# Patient Record
Sex: MALE | Race: WHITE | Employment: FULL TIME | URBAN - METROPOLITAN AREA
[De-identification: names, ages, dates, MRNs, and addresses within clinical notes are randomized per-mention and may not be internally consistent; named-entity substitution may affect disease eponyms.]

---

## 2017-01-07 ENCOUNTER — LAB ENCOUNTER (OUTPATIENT)
Dept: LAB | Facility: HOSPITAL | Age: 57
End: 2017-01-07
Attending: FAMILY MEDICINE
Payer: COMMERCIAL

## 2017-01-07 ENCOUNTER — PRIOR ORIGINAL RECORDS (OUTPATIENT)
Dept: OTHER | Age: 57
End: 2017-01-07

## 2017-01-07 DIAGNOSIS — E78.2 HYPERLIPIDEMIA, MIXED: ICD-10-CM

## 2017-01-07 DIAGNOSIS — E11.9 CONTROLLED TYPE 2 DIABETES MELLITUS WITHOUT COMPLICATION, WITHOUT LONG-TERM CURRENT USE OF INSULIN (HCC): ICD-10-CM

## 2017-01-07 DIAGNOSIS — I10 ESSENTIAL HYPERTENSION: ICD-10-CM

## 2017-01-07 LAB
ALBUMIN SERPL-MCNC: 4 G/DL (ref 3.5–4.8)
ALP LIVER SERPL-CCNC: 71 U/L (ref 45–117)
ALT SERPL-CCNC: 44 U/L (ref 17–63)
AST SERPL-CCNC: 28 U/L (ref 15–41)
BILIRUB SERPL-MCNC: 0.5 MG/DL (ref 0.1–2)
BUN BLD-MCNC: 20 MG/DL (ref 8–20)
CALCIUM BLD-MCNC: 8.8 MG/DL (ref 8.3–10.3)
CHLORIDE: 105 MMOL/L (ref 101–111)
CHOLEST SMN-MCNC: 162 MG/DL (ref ?–200)
CO2: 25 MMOL/L (ref 22–32)
CREAT BLD-MCNC: 1.07 MG/DL (ref 0.7–1.3)
CREAT UR-SCNC: 299 MG/DL
EST. AVERAGE GLUCOSE BLD GHB EST-MCNC: 143 MG/DL (ref 68–126)
GLUCOSE BLD-MCNC: 135 MG/DL (ref 70–99)
HBA1C MFR BLD HPLC: 6.6 % (ref ?–5.7)
HDLC SERPL-MCNC: 36 MG/DL (ref 45–?)
HDLC SERPL: 4.5 {RATIO} (ref ?–4.97)
LDLC SERPL DIRECT ASSAY-MCNC: 78 MG/DL (ref ?–100)
M PROTEIN MFR SERPL ELPH: 6.7 G/DL (ref 6.1–8.3)
MICROALBUMIN UR-MCNC: 4.1 MG/DL
MICROALBUMIN/CREAT 24H UR-RTO: 13.7 UG/MG (ref ?–30)
NONHDLC SERPL-MCNC: 126 MG/DL (ref ?–130)
POTASSIUM SERPL-SCNC: 4.2 MMOL/L (ref 3.6–5.1)
SODIUM SERPL-SCNC: 138 MMOL/L (ref 136–144)
TRIGLYCERIDES: 443 MG/DL (ref ?–150)

## 2017-01-07 PROCEDURE — 83036 HEMOGLOBIN GLYCOSYLATED A1C: CPT

## 2017-01-07 PROCEDURE — 82570 ASSAY OF URINE CREATININE: CPT

## 2017-01-07 PROCEDURE — 80061 LIPID PANEL: CPT

## 2017-01-07 PROCEDURE — 82043 UR ALBUMIN QUANTITATIVE: CPT

## 2017-01-07 PROCEDURE — 36415 COLL VENOUS BLD VENIPUNCTURE: CPT

## 2017-01-07 PROCEDURE — 80053 COMPREHEN METABOLIC PANEL: CPT

## 2017-01-07 PROCEDURE — 83721 ASSAY OF BLOOD LIPOPROTEIN: CPT

## 2017-01-09 ENCOUNTER — CHARTING TRANS (OUTPATIENT)
Dept: OTHER | Age: 57
End: 2017-01-09

## 2017-01-09 ASSESSMENT — PAIN SCALES - GENERAL: PAINLEVEL_OUTOF10: 0

## 2017-02-06 ENCOUNTER — MYAURORA ACCOUNT LINK (OUTPATIENT)
Dept: OTHER | Age: 57
End: 2017-02-06

## 2017-02-06 ENCOUNTER — HOSPITAL ENCOUNTER (OUTPATIENT)
Dept: CARDIOLOGY CLINIC | Facility: HOSPITAL | Age: 57
Discharge: HOME | End: 2017-02-06
Attending: INTERNAL MEDICINE

## 2017-02-06 DIAGNOSIS — I65.23 BILATERAL CAROTID ARTERY STENOSIS: ICD-10-CM

## 2017-02-07 ENCOUNTER — TELEPHONE (OUTPATIENT)
Dept: FAMILY MEDICINE CLINIC | Facility: CLINIC | Age: 57
End: 2017-02-07

## 2017-02-08 ENCOUNTER — MED REC SCAN ONLY (OUTPATIENT)
Dept: FAMILY MEDICINE CLINIC | Facility: CLINIC | Age: 57
End: 2017-02-08

## 2017-02-20 ENCOUNTER — PRIOR ORIGINAL RECORDS (OUTPATIENT)
Dept: OTHER | Age: 57
End: 2017-02-20

## 2017-02-21 LAB
ALBUMIN: 4 G/DL
ALKALINE PHOSPHATATE(ALK PHOS): 71 IU/L
BILIRUBIN TOTAL: 0.5 MG/DL
BUN: 20 MG/DL
CALCIUM: 8.8 MG/DL
CHLORIDE: 105 MEQ/L
CHOLESTEROL, TOTAL: 162 MG/DL
CREATININE, SERUM: 1.07 MG/DL
GLUCOSE: 135 MG/DL
HDL CHOLESTEROL: 36 MG/DL
HEMOGLOBIN A1C: 6.6 %
POTASSIUM, SERUM: 4.2 MEQ/L
PROTEIN, TOTAL: 6.7 G/DL
SGOT (AST): 28 IU/L
SGPT (ALT): 44 IU/L
SODIUM: 138 MEQ/L
TRIGLYCERIDES: 443 MG/DL

## 2017-03-06 RX ORDER — OMEGA-3-ACID ETHYL ESTERS 1 G/1
CAPSULE, LIQUID FILLED ORAL
Qty: 360 CAPSULE | Refills: 1 | Status: SHIPPED | OUTPATIENT
Start: 2017-03-06 | End: 2017-06-16

## 2017-03-06 NOTE — TELEPHONE ENCOUNTER
No future appointments. LOV 11/16     LAST LAB 1/17    LAST RX  [place]-3-acid Ethyl Esters 1 G Oral Cap 360 capsule 1 7/6/2016         PROTOCOL  Cholesterol Medication Protocol Passed3/5 2  Refilled x 6 months.

## 2017-03-24 ENCOUNTER — HOSPITAL ENCOUNTER (OUTPATIENT)
Dept: CV DIAGNOSTICS | Facility: HOSPITAL | Age: 57
Discharge: HOME | End: 2017-03-24
Attending: INTERNAL MEDICINE
Payer: COMMERCIAL

## 2017-03-24 DIAGNOSIS — I10 ESSENTIAL (PRIMARY) HYPERTENSION: ICD-10-CM

## 2017-03-24 DIAGNOSIS — I25.10 CAD (CORONARY ARTERY DISEASE): ICD-10-CM

## 2017-03-24 PROCEDURE — 78452 HT MUSCLE IMAGE SPECT MULT: CPT | Performed by: INTERNAL MEDICINE

## 2017-03-24 PROCEDURE — 78452 HT MUSCLE IMAGE SPECT MULT: CPT

## 2017-03-24 PROCEDURE — 93018 CV STRESS TEST I&R ONLY: CPT | Performed by: INTERNAL MEDICINE

## 2017-03-24 PROCEDURE — 93017 CV STRESS TEST TRACING ONLY: CPT

## 2017-04-29 ENCOUNTER — CHARTING TRANS (OUTPATIENT)
Dept: OTHER | Age: 57
End: 2017-04-29

## 2017-05-25 ENCOUNTER — TELEPHONE (OUTPATIENT)
Dept: FAMILY MEDICINE CLINIC | Facility: CLINIC | Age: 57
End: 2017-05-25

## 2017-05-25 DIAGNOSIS — Z79.899 ENCOUNTER FOR LONG-TERM (CURRENT) USE OF MEDICATIONS: ICD-10-CM

## 2017-05-25 DIAGNOSIS — I10 ESSENTIAL HYPERTENSION, BENIGN: Primary | ICD-10-CM

## 2017-05-25 DIAGNOSIS — E78.00 PURE HYPERCHOLESTEROLEMIA: ICD-10-CM

## 2017-05-25 DIAGNOSIS — E78.1 PURE HYPERGLYCERIDEMIA: ICD-10-CM

## 2017-05-25 NOTE — TELEPHONE ENCOUNTER
Pt scheduled 6 month diabetic OV  Due for repeat labs    Orders placed    Future Appointments  Date Time Provider Steffi Karen   6/13/2017 1:30 PM Zenaida Nagy MD [facility] 21 [facility] [address]

## 2017-06-13 ENCOUNTER — APPOINTMENT (OUTPATIENT)
Dept: LAB | Facility: HOSPITAL | Age: 57
End: 2017-06-13
Attending: FAMILY MEDICINE
Payer: COMMERCIAL

## 2017-06-13 DIAGNOSIS — E78.1 PURE HYPERGLYCERIDEMIA: ICD-10-CM

## 2017-06-13 DIAGNOSIS — I10 ESSENTIAL HYPERTENSION, BENIGN: ICD-10-CM

## 2017-06-13 DIAGNOSIS — E78.00 PURE HYPERCHOLESTEROLEMIA: ICD-10-CM

## 2017-06-13 DIAGNOSIS — Z79.899 ENCOUNTER FOR LONG-TERM (CURRENT) USE OF MEDICATIONS: ICD-10-CM

## 2017-06-13 PROCEDURE — 80053 COMPREHEN METABOLIC PANEL: CPT

## 2017-06-13 PROCEDURE — 36415 COLL VENOUS BLD VENIPUNCTURE: CPT

## 2017-06-13 PROCEDURE — 83036 HEMOGLOBIN GLYCOSYLATED A1C: CPT

## 2017-06-13 PROCEDURE — 80061 LIPID PANEL: CPT

## 2017-06-16 ENCOUNTER — OFFICE VISIT (OUTPATIENT)
Dept: FAMILY MEDICINE CLINIC | Facility: CLINIC | Age: 57
End: 2017-06-16

## 2017-06-16 VITALS
WEIGHT: 233 LBS | SYSTOLIC BLOOD PRESSURE: 118 MMHG | OXYGEN SATURATION: 98 % | HEIGHT: 70.87 IN | BODY MASS INDEX: 32.62 KG/M2 | HEART RATE: 54 BPM | DIASTOLIC BLOOD PRESSURE: 70 MMHG

## 2017-06-16 DIAGNOSIS — I25.10 ATHEROSCLEROSIS OF NATIVE CORONARY ARTERY OF NATIVE HEART WITHOUT ANGINA PECTORIS: ICD-10-CM

## 2017-06-16 DIAGNOSIS — I95.1 ORTHOSTATIC HYPOTENSION: ICD-10-CM

## 2017-06-16 DIAGNOSIS — E78.2 MIXED HYPERLIPIDEMIA: Primary | ICD-10-CM

## 2017-06-16 DIAGNOSIS — R73.9 HYPERGLYCEMIA: ICD-10-CM

## 2017-06-16 DIAGNOSIS — I10 ESSENTIAL HYPERTENSION, BENIGN: ICD-10-CM

## 2017-06-16 PROCEDURE — 99214 OFFICE O/P EST MOD 30 MIN: CPT | Performed by: FAMILY MEDICINE

## 2017-06-16 RX ORDER — LISINOPRIL 40 MG/1
20 TABLET ORAL DAILY
Refills: 3 | COMMUNITY
Start: 2017-06-02 | End: 2017-06-16 | Stop reason: DRUGHIGH

## 2017-06-16 RX ORDER — ATENOLOL 25 MG/1
TABLET ORAL
Refills: 3 | COMMUNITY
Start: 2017-06-02 | End: 2017-06-16

## 2017-06-16 RX ORDER — LISINOPRIL 20 MG/1
20 TABLET ORAL DAILY
Qty: 90 TABLET | Refills: 1 | Status: SHIPPED | OUTPATIENT
Start: 2017-06-16 | End: 2018-01-06

## 2017-06-16 RX ORDER — ATENOLOL 25 MG/1
25 TABLET ORAL DAILY
Qty: 90 TABLET | Refills: 1 | Status: SHIPPED | OUTPATIENT
Start: 2017-06-16

## 2017-06-16 RX ORDER — OMEGA-3-ACID ETHYL ESTERS 1 G/1
CAPSULE, LIQUID FILLED ORAL
Qty: 360 CAPSULE | Refills: 1 | Status: SHIPPED | OUTPATIENT
Start: 2017-06-16 | End: 2017-10-24

## 2017-06-16 NOTE — PATIENT INSTRUCTIONS
You're doing great with lifestyle change! Decrease lisinopril to 20 mg daily, recheck in 6 months on labs & exam.    If BP runs over 130/80 consistently, let cardiology know.  If over 140/90, let us know, you should increase some then on lisinopril, could

## 2017-06-16 NOTE — PROGRESS NOTES
Larry Cleaning IS A 60year old male HERE FOR Patient presents with:  Diabetes: follow up       History of present illness:     Exercising more, less rowing and more walking. Lost weight. Rowing made him more hungry. Sticking with meat, fish, veggies.  Av GLUCOS-CHONDROIT-CA-MG-C-D OR Take  by mouth. Disp:  Rfl:    aspirin (ASPIR-81) 81 MG Oral Tab EC Take 81 mg by mouth daily.  Disp:  Rfl:    [DISCONTINUED] atenolol 25 MG Oral Tab TK 1 T PO QD Disp:  Rfl: 3   [DISCONTINUED] atenolol (TENORMIN) 50 MG Oral lightheaded, otherwise normal    Exam:     /70 mmHg  Pulse 54  Ht 70.87\"  Wt 233 lb  BMI 32.62 kg/m2  SpO2 98%     Carotids normal without bruits, right scar noted  Lungs clear   Heart regular rhythm, bradycardia  Abdomen soft nontender  Bilater CAPSULES BY MOUTH TWICE DAILY  -     Lipid in 6 months; Future    Essential hypertension, benign--decrease lisinopril from 40 to 20 mg due to orthostatic hypotension  -     lisinopril 20 MG Oral Tab; Take 1 tablet (20 mg total) by mouth daily.   -     ateno

## 2017-06-19 ENCOUNTER — MED REC SCAN ONLY (OUTPATIENT)
Dept: FAMILY MEDICINE CLINIC | Facility: CLINIC | Age: 57
End: 2017-06-19

## 2017-07-12 RX ORDER — RANITIDINE 150 MG/1
CAPSULE ORAL
Qty: 90 CAPSULE | Refills: 0 | Status: SHIPPED | OUTPATIENT
Start: 2017-07-12 | End: 2017-12-11

## 2017-07-12 NOTE — TELEPHONE ENCOUNTER
Future Appointments  Date Time Provider Steffi Jones   12/22/2017 8:00 AM Yolie Love MD [facility] 21 EMG [address] 59     LOV 6/17    LAST LAB 6/17     LAST RX   RaNITidine HCl 150 MG Oral Cap 90 capsule 3 11/28/2016         PROTOCOL  Gastrointestinal Medi

## 2017-09-01 ENCOUNTER — CHARTING TRANS (OUTPATIENT)
Dept: OTHER | Age: 57
End: 2017-09-01

## 2017-10-10 ENCOUNTER — TELEPHONE (OUTPATIENT)
Dept: FAMILY MEDICINE CLINIC | Facility: CLINIC | Age: 57
End: 2017-10-10

## 2017-10-10 RX ORDER — OMEGA-3-ACID ETHYL ESTERS 1 G/1
CAPSULE, LIQUID FILLED ORAL
Qty: 360 CAPSULE | Refills: 0 | OUTPATIENT
Start: 2017-10-10

## 2017-10-10 NOTE — TELEPHONE ENCOUNTER
Cholesterol Medication Protocol Yzbtsf03/10 6:14 AM   ALT < 80    ALT resulted within past year    Lipid panel within past 12 months    Appointment within past 12 or next 3 months     Last refill 6/16/17 x 6 months    NEED TO CANCEL AND RESCHEDULE APPT UNT

## 2017-10-11 NOTE — TELEPHONE ENCOUNTER
Called patient and LVM stating we need to reschedule appt for Jan but to please still do labs in Dec.  Call clinic to reschedule.

## 2017-10-11 NOTE — TELEPHONE ENCOUNTER
Patient returned our call to reschedule appt. Future Appointments  Date Time Provider Steffi Jones   1/19/2018 9:00 AM Rita Carrillo MD [facility] 21 EM[facility] [address]     Reminded him to have labs drawn in December.

## 2017-10-24 RX ORDER — OMEGA-3-ACID ETHYL ESTERS 1 G/1
CAPSULE, LIQUID FILLED ORAL
Qty: 360 CAPSULE | Refills: 1 | Status: SHIPPED | OUTPATIENT
Start: 2017-10-24 | End: 2018-01-18

## 2017-10-24 NOTE — TELEPHONE ENCOUNTER
Future Appointments  Date Time Provider Steffi Jones   1/19/2018 9:00 AM Angela Stanford MD EM[facility] 21 EM[facility] [address] 59       LOV 6/17    LAST LAB 6/17    LAST RX    [place]-3-acid Ethyl Esters 1 g Oral Cap 360 capsule 1 6/16/2017         PROTOCOL  Cholesterol

## 2017-12-12 RX ORDER — RANITIDINE 150 MG/1
CAPSULE ORAL
Qty: 90 CAPSULE | Refills: 0 | Status: SHIPPED | OUTPATIENT
Start: 2017-12-12 | End: 2018-01-18

## 2017-12-12 NOTE — TELEPHONE ENCOUNTER
Future Appointments  Date Time Provider Steffi Jones   1/19/2018 9:00 AM Cesario Babinski, MD EM[facility] 21 EMG [address]t 59       LOV 6/16    LAST LAB 6/17     LAST RX   RANITIDINE  MG Oral Cap 90 capsule 0 7/12/2017       PROTOCOL  Gastrointestinal Medica

## 2017-12-20 ENCOUNTER — OFFICE VISIT (OUTPATIENT)
Dept: FAMILY MEDICINE CLINIC | Facility: CLINIC | Age: 57
End: 2017-12-20

## 2017-12-20 VITALS
HEART RATE: 52 BPM | DIASTOLIC BLOOD PRESSURE: 70 MMHG | RESPIRATION RATE: 16 BRPM | TEMPERATURE: 98 F | BODY MASS INDEX: 32.1 KG/M2 | HEIGHT: 70.25 IN | WEIGHT: 224.25 LBS | SYSTOLIC BLOOD PRESSURE: 132 MMHG

## 2017-12-20 DIAGNOSIS — G47.00 INSOMNIA, UNSPECIFIED TYPE: ICD-10-CM

## 2017-12-20 PROCEDURE — 99213 OFFICE O/P EST LOW 20 MIN: CPT | Performed by: FAMILY MEDICINE

## 2017-12-20 RX ORDER — CLONAZEPAM 0.5 MG/1
0.5 TABLET ORAL NIGHTLY PRN
Qty: 10 TABLET | Refills: 0 | Status: SHIPPED | OUTPATIENT
Start: 2017-12-20 | End: 2017-12-30

## 2017-12-20 RX ORDER — TRAZODONE HYDROCHLORIDE 50 MG/1
TABLET ORAL NIGHTLY
Qty: 90 TABLET | Refills: 1 | Status: SHIPPED | OUTPATIENT
Start: 2017-12-20 | End: 2018-01-26 | Stop reason: ALTCHOICE

## 2017-12-20 NOTE — PROGRESS NOTES
HPI:   Froilan Domínguez is a 59year old male that presents for follow-up insomnia. Every 1-2 years he struggles with insomnia and uses trazodone for sleep. He takes between 25 and 50 mg nightly which works well for him.   He states in the past 2 week he 70.25\"   Wt 224 lb 4 oz   BMI 31.95 kg/m²  Estimated body mass index is 31.95 kg/m² as calculated from the following:    Height as of this encounter: 70.25\". Weight as of this encounter: 224 lb 4 oz. Vital signs reviewed. Appears stated age, well colton

## 2018-01-06 DIAGNOSIS — I10 ESSENTIAL HYPERTENSION, BENIGN: ICD-10-CM

## 2018-01-08 RX ORDER — LISINOPRIL 20 MG/1
TABLET ORAL
Qty: 90 TABLET | Refills: 0 | Status: SHIPPED | OUTPATIENT
Start: 2018-01-08 | End: 2018-01-18

## 2018-01-08 NOTE — TELEPHONE ENCOUNTER
Future Appointments  Date Time Provider Steffi Jones   1/19/2018 9:00 AM Augustine Mendez MD [facility] 21 EM[facility] [address]   2/9/2018 9:30 AM Cheri Francisco MD [facility] 21 EM[facility] [address]     LOV  6/17    LAST LAB 6/17    LAST RX   lisinopril 20 MG Oral Tab 90 tablet 1

## 2018-01-15 ENCOUNTER — APPOINTMENT (OUTPATIENT)
Dept: LAB | Age: 58
End: 2018-01-15
Attending: FAMILY MEDICINE
Payer: COMMERCIAL

## 2018-01-15 ENCOUNTER — PRIOR ORIGINAL RECORDS (OUTPATIENT)
Dept: OTHER | Age: 58
End: 2018-01-15

## 2018-01-15 DIAGNOSIS — R73.9 HYPERGLYCEMIA: ICD-10-CM

## 2018-01-15 DIAGNOSIS — E78.2 MIXED HYPERLIPIDEMIA: ICD-10-CM

## 2018-01-15 LAB
ALBUMIN SERPL-MCNC: 3.9 G/DL (ref 3.5–4.8)
ALP LIVER SERPL-CCNC: 51 U/L (ref 45–117)
ALT SERPL-CCNC: 26 U/L (ref 17–63)
AST SERPL-CCNC: 14 U/L (ref 15–41)
BILIRUB SERPL-MCNC: 0.7 MG/DL (ref 0.1–2)
BUN BLD-MCNC: 22 MG/DL (ref 8–20)
CALCIUM BLD-MCNC: 9.1 MG/DL (ref 8.3–10.3)
CHLORIDE: 108 MMOL/L (ref 101–111)
CHOLEST SMN-MCNC: 125 MG/DL (ref ?–200)
CO2: 26 MMOL/L (ref 22–32)
CREAT BLD-MCNC: 1.08 MG/DL (ref 0.7–1.3)
EST. AVERAGE GLUCOSE BLD GHB EST-MCNC: 123 MG/DL (ref 68–126)
GLUCOSE BLD-MCNC: 111 MG/DL (ref 70–99)
HBA1C MFR BLD HPLC: 5.9 % (ref ?–5.7)
HDLC SERPL-MCNC: 34 MG/DL (ref 45–?)
HDLC SERPL: 3.68 {RATIO} (ref ?–4.97)
LDLC SERPL CALC-MCNC: 61 MG/DL (ref ?–130)
M PROTEIN MFR SERPL ELPH: 6.7 G/DL (ref 6.1–8.3)
NONHDLC SERPL-MCNC: 91 MG/DL (ref ?–130)
POTASSIUM SERPL-SCNC: 4.8 MMOL/L (ref 3.6–5.1)
SODIUM SERPL-SCNC: 141 MMOL/L (ref 136–144)
TRIGL SERPL-MCNC: 150 MG/DL (ref ?–150)
VLDLC SERPL CALC-MCNC: 30 MG/DL (ref 5–40)

## 2018-01-15 PROCEDURE — 83036 HEMOGLOBIN GLYCOSYLATED A1C: CPT | Performed by: FAMILY MEDICINE

## 2018-01-15 PROCEDURE — 80053 COMPREHEN METABOLIC PANEL: CPT | Performed by: FAMILY MEDICINE

## 2018-01-15 PROCEDURE — 80061 LIPID PANEL: CPT | Performed by: FAMILY MEDICINE

## 2018-01-15 PROCEDURE — 36415 COLL VENOUS BLD VENIPUNCTURE: CPT | Performed by: FAMILY MEDICINE

## 2018-01-18 ENCOUNTER — HOSPITAL ENCOUNTER (OUTPATIENT)
Facility: HOSPITAL | Age: 58
Setting detail: OBSERVATION
Discharge: HOME | End: 2018-01-19
Attending: EMERGENCY MEDICINE | Admitting: INTERNAL MEDICINE
Payer: COMMERCIAL

## 2018-01-18 ENCOUNTER — PRIOR ORIGINAL RECORDS (OUTPATIENT)
Dept: OTHER | Age: 58
End: 2018-01-18

## 2018-01-18 ENCOUNTER — APPOINTMENT (OUTPATIENT)
Dept: MRI IMAGING | Facility: HOSPITAL | Age: 58
End: 2018-01-18
Attending: INTERNAL MEDICINE
Payer: COMMERCIAL

## 2018-01-18 ENCOUNTER — APPOINTMENT (OUTPATIENT)
Dept: CT IMAGING | Age: 58
End: 2018-01-18
Attending: EMERGENCY MEDICINE
Payer: COMMERCIAL

## 2018-01-18 ENCOUNTER — APPOINTMENT (OUTPATIENT)
Dept: GENERAL RADIOLOGY | Facility: HOSPITAL | Age: 58
End: 2018-01-18
Attending: EMERGENCY MEDICINE
Payer: COMMERCIAL

## 2018-01-18 ENCOUNTER — APPOINTMENT (OUTPATIENT)
Dept: CT IMAGING | Facility: HOSPITAL | Age: 58
End: 2018-01-18
Attending: EMERGENCY MEDICINE
Payer: COMMERCIAL

## 2018-01-18 ENCOUNTER — TELEPHONE (OUTPATIENT)
Dept: FAMILY MEDICINE CLINIC | Facility: CLINIC | Age: 58
End: 2018-01-18

## 2018-01-18 DIAGNOSIS — G45.9 TRANSIENT CEREBRAL ISCHEMIA, UNSPECIFIED TYPE: Primary | ICD-10-CM

## 2018-01-18 DIAGNOSIS — G45.9 TRANSIENT CEREBRAL ISCHEMIA: ICD-10-CM

## 2018-01-18 DIAGNOSIS — E78.00 PURE HYPERCHOLESTEROLEMIA: ICD-10-CM

## 2018-01-18 LAB
ALBUMIN SERPL-MCNC: 4.3 G/DL (ref 3.5–4.8)
ALP LIVER SERPL-CCNC: 56 U/L (ref 45–117)
ALT SERPL-CCNC: 26 U/L (ref 17–63)
AST SERPL-CCNC: 14 U/L (ref 15–41)
BASOPHILS # BLD AUTO: 0.04 X10(3) UL (ref 0–0.1)
BASOPHILS NFR BLD AUTO: 0.4 %
BILIRUB SERPL-MCNC: 1 MG/DL (ref 0.1–2)
BUN BLD-MCNC: 22 MG/DL (ref 8–20)
CALCIUM BLD-MCNC: 9.2 MG/DL (ref 8.3–10.3)
CHLORIDE: 104 MMOL/L (ref 101–111)
CO2: 27 MMOL/L (ref 22–32)
CREAT BLD-MCNC: 1.07 MG/DL (ref 0.7–1.3)
EOSINOPHIL # BLD AUTO: 0.17 X10(3) UL (ref 0–0.3)
EOSINOPHIL NFR BLD AUTO: 1.9 %
ERYTHROCYTE [DISTWIDTH] IN BLOOD BY AUTOMATED COUNT: 12.1 % (ref 11.5–16)
EST. AVERAGE GLUCOSE BLD GHB EST-MCNC: 120 MG/DL (ref 68–126)
GLUCOSE BLD-MCNC: 102 MG/DL (ref 70–99)
HBA1C MFR BLD HPLC: 5.8 % (ref ?–5.7)
HCT VFR BLD AUTO: 43.9 % (ref 37–53)
HGB BLD-MCNC: 15.1 G/DL (ref 13–17)
IMMATURE GRANULOCYTE COUNT: 0.01 X10(3) UL (ref 0–1)
IMMATURE GRANULOCYTE RATIO %: 0.1 %
INR BLD: 1.11 (ref 0.89–1.11)
LYMPHOCYTES # BLD AUTO: 2.19 X10(3) UL (ref 0.9–4)
LYMPHOCYTES NFR BLD AUTO: 24.3 %
M PROTEIN MFR SERPL ELPH: 7.3 G/DL (ref 6.1–8.3)
MCH RBC QN AUTO: 29.5 PG (ref 27–33.2)
MCHC RBC AUTO-ENTMCNC: 34.4 G/DL (ref 31–37)
MCV RBC AUTO: 85.7 FL (ref 80–99)
MONOCYTES # BLD AUTO: 0.6 X10(3) UL (ref 0.1–0.6)
MONOCYTES NFR BLD AUTO: 6.7 %
NEUTROPHIL ABS PRELIM: 6.01 X10 (3) UL (ref 1.3–6.7)
NEUTROPHILS # BLD AUTO: 6.01 X10(3) UL (ref 1.3–6.7)
NEUTROPHILS NFR BLD AUTO: 66.6 %
PLATELET # BLD AUTO: 237 10(3)UL (ref 150–450)
POTASSIUM SERPL-SCNC: 3.8 MMOL/L (ref 3.6–5.1)
PSA SERPL DL<=0.01 NG/ML-MCNC: 14.3 SECONDS (ref 12–14.3)
RBC # BLD AUTO: 5.12 X10(6)UL (ref 4.3–5.7)
RED CELL DISTRIBUTION WIDTH-SD: 38 FL (ref 35.1–46.3)
SODIUM SERPL-SCNC: 138 MMOL/L (ref 136–144)
WBC # BLD AUTO: 9 X10(3) UL (ref 4–13)

## 2018-01-18 PROCEDURE — 70450 CT HEAD/BRAIN W/O DYE: CPT | Performed by: EMERGENCY MEDICINE

## 2018-01-18 PROCEDURE — 70544 MR ANGIOGRAPHY HEAD W/O DYE: CPT | Performed by: INTERNAL MEDICINE

## 2018-01-18 PROCEDURE — 99220 INITIAL OBSERVATION CARE,LEVL III: CPT | Performed by: INTERNAL MEDICINE

## 2018-01-18 PROCEDURE — 70547 MR ANGIOGRAPHY NECK W/O DYE: CPT | Performed by: INTERNAL MEDICINE

## 2018-01-18 PROCEDURE — 70551 MRI BRAIN STEM W/O DYE: CPT | Performed by: INTERNAL MEDICINE

## 2018-01-18 PROCEDURE — 71045 X-RAY EXAM CHEST 1 VIEW: CPT | Performed by: EMERGENCY MEDICINE

## 2018-01-18 RX ORDER — DEXAMETHASONE SODIUM PHOSPHATE 4 MG/ML
10 VIAL (ML) INJECTION ONCE
Status: DISCONTINUED | OUTPATIENT
Start: 2018-01-18 | End: 2018-01-18

## 2018-01-18 RX ORDER — POLYETHYLENE GLYCOL 3350 17 G/17G
17 POWDER, FOR SOLUTION ORAL DAILY PRN
Status: DISCONTINUED | OUTPATIENT
Start: 2018-01-18 | End: 2018-01-19

## 2018-01-18 RX ORDER — CLONAZEPAM 0.5 MG/1
0.5 TABLET ORAL NIGHTLY PRN
Status: DISCONTINUED | OUTPATIENT
Start: 2018-01-18 | End: 2018-01-19

## 2018-01-18 RX ORDER — TRAZODONE HYDROCHLORIDE 50 MG/1
50 TABLET ORAL NIGHTLY
Status: DISCONTINUED | OUTPATIENT
Start: 2018-01-18 | End: 2018-01-19

## 2018-01-18 RX ORDER — MORPHINE SULFATE 2 MG/ML
2 INJECTION, SOLUTION INTRAMUSCULAR; INTRAVENOUS EVERY 2 HOUR PRN
Status: DISCONTINUED | OUTPATIENT
Start: 2018-01-18 | End: 2018-01-19

## 2018-01-18 RX ORDER — DOXEPIN HYDROCHLORIDE 50 MG/1
1 CAPSULE ORAL DAILY
Status: DISCONTINUED | OUTPATIENT
Start: 2018-01-18 | End: 2018-01-19

## 2018-01-18 RX ORDER — BISACODYL 10 MG
10 SUPPOSITORY, RECTAL RECTAL
Status: DISCONTINUED | OUTPATIENT
Start: 2018-01-18 | End: 2018-01-19

## 2018-01-18 RX ORDER — LISINOPRIL 20 MG/1
20 TABLET ORAL DAILY
COMMUNITY
End: 2018-04-11

## 2018-01-18 RX ORDER — FAMOTIDINE 20 MG/1
20 TABLET ORAL 2 TIMES DAILY
Status: DISCONTINUED | OUTPATIENT
Start: 2018-01-18 | End: 2018-01-19

## 2018-01-18 RX ORDER — TRAZODONE HYDROCHLORIDE 50 MG/1
TABLET ORAL NIGHTLY
Status: DISCONTINUED | OUTPATIENT
Start: 2018-01-18 | End: 2018-01-18 | Stop reason: SDUPTHER

## 2018-01-18 RX ORDER — CLONAZEPAM 0.5 MG/1
0.5 TABLET ORAL NIGHTLY PRN
Status: ON HOLD | COMMUNITY
End: 2018-01-19

## 2018-01-18 RX ORDER — ATENOLOL 25 MG/1
25 TABLET ORAL NIGHTLY
Status: DISCONTINUED | OUTPATIENT
Start: 2018-01-18 | End: 2018-01-19

## 2018-01-18 RX ORDER — METOCLOPRAMIDE HYDROCHLORIDE 5 MG/ML
10 INJECTION INTRAMUSCULAR; INTRAVENOUS EVERY 8 HOURS PRN
Status: DISCONTINUED | OUTPATIENT
Start: 2018-01-18 | End: 2018-01-19

## 2018-01-18 RX ORDER — SODIUM CHLORIDE 9 MG/ML
INJECTION, SOLUTION INTRAVENOUS CONTINUOUS
Status: DISCONTINUED | OUTPATIENT
Start: 2018-01-18 | End: 2018-01-19

## 2018-01-18 RX ORDER — ONDANSETRON 2 MG/ML
4 INJECTION INTRAMUSCULAR; INTRAVENOUS EVERY 6 HOURS PRN
Status: DISCONTINUED | OUTPATIENT
Start: 2018-01-18 | End: 2018-01-19

## 2018-01-18 RX ORDER — ATORVASTATIN CALCIUM 80 MG/1
80 TABLET, FILM COATED ORAL NIGHTLY
Status: DISCONTINUED | OUTPATIENT
Start: 2018-01-18 | End: 2018-01-19

## 2018-01-18 RX ORDER — MORPHINE SULFATE 2 MG/ML
1 INJECTION, SOLUTION INTRAMUSCULAR; INTRAVENOUS EVERY 2 HOUR PRN
Status: DISCONTINUED | OUTPATIENT
Start: 2018-01-18 | End: 2018-01-19

## 2018-01-18 RX ORDER — PHENYLEPHRINE HCL IN 0.9% NACL 50MG/250ML
PLASTIC BAG, INJECTION (ML) INTRAVENOUS CONTINUOUS PRN
Status: DISCONTINUED | OUTPATIENT
Start: 2018-01-18 | End: 2018-01-19

## 2018-01-18 RX ORDER — DOCUSATE SODIUM 100 MG/1
100 CAPSULE, LIQUID FILLED ORAL 2 TIMES DAILY
Status: DISCONTINUED | OUTPATIENT
Start: 2018-01-18 | End: 2018-01-19

## 2018-01-18 RX ORDER — POTASSIUM CHLORIDE 20 MEQ/1
40 TABLET, EXTENDED RELEASE ORAL ONCE
Status: COMPLETED | OUTPATIENT
Start: 2018-01-18 | End: 2018-01-18

## 2018-01-18 RX ORDER — RANITIDINE 150 MG/1
150 CAPSULE ORAL EVERY EVENING
COMMUNITY
End: 2018-03-13

## 2018-01-18 RX ORDER — SODIUM PHOSPHATE, DIBASIC AND SODIUM PHOSPHATE, MONOBASIC 7; 19 G/133ML; G/133ML
1 ENEMA RECTAL ONCE AS NEEDED
Status: DISCONTINUED | OUTPATIENT
Start: 2018-01-18 | End: 2018-01-19

## 2018-01-18 RX ORDER — ACETAMINOPHEN 325 MG/1
650 TABLET ORAL EVERY 4 HOURS PRN
Status: DISCONTINUED | OUTPATIENT
Start: 2018-01-18 | End: 2018-01-19

## 2018-01-18 RX ORDER — LISINOPRIL 10 MG/1
20 TABLET ORAL NIGHTLY
Status: DISCONTINUED | OUTPATIENT
Start: 2018-01-18 | End: 2018-01-19

## 2018-01-18 RX ORDER — ASPIRIN 300 MG
300 SUPPOSITORY, RECTAL RECTAL DAILY
Status: DISCONTINUED | OUTPATIENT
Start: 2018-01-18 | End: 2018-01-19

## 2018-01-18 RX ORDER — ACETAMINOPHEN 650 MG/1
650 SUPPOSITORY RECTAL EVERY 4 HOURS PRN
Status: DISCONTINUED | OUTPATIENT
Start: 2018-01-18 | End: 2018-01-19

## 2018-01-18 RX ORDER — FAMOTIDINE 10 MG/ML
20 INJECTION, SOLUTION INTRAVENOUS 2 TIMES DAILY
Status: DISCONTINUED | OUTPATIENT
Start: 2018-01-18 | End: 2018-01-19

## 2018-01-18 RX ORDER — ASPIRIN 325 MG
325 TABLET ORAL ONCE
Status: COMPLETED | OUTPATIENT
Start: 2018-01-18 | End: 2018-01-18

## 2018-01-18 RX ORDER — LABETALOL HYDROCHLORIDE 5 MG/ML
10 INJECTION, SOLUTION INTRAVENOUS EVERY 10 MIN PRN
Status: DISCONTINUED | OUTPATIENT
Start: 2018-01-18 | End: 2018-01-19

## 2018-01-18 RX ORDER — CHLORAL HYDRATE 500 MG
2000 CAPSULE ORAL 2 TIMES DAILY
COMMUNITY
End: 2019-06-07

## 2018-01-18 RX ORDER — ASPIRIN 325 MG
325 TABLET ORAL DAILY
Status: DISCONTINUED | OUTPATIENT
Start: 2018-01-18 | End: 2018-01-19

## 2018-01-18 RX ORDER — TRAZODONE HYDROCHLORIDE 50 MG/1
25 TABLET ORAL NIGHTLY
Status: DISCONTINUED | OUTPATIENT
Start: 2018-01-18 | End: 2018-01-19

## 2018-01-18 RX ORDER — MULTIVITAMIN WITH FOLIC ACID 400 MCG
1 TABLET ORAL DAILY
COMMUNITY

## 2018-01-18 RX ORDER — SENNOSIDES 8.6 MG
17.2 TABLET ORAL NIGHTLY
Status: DISCONTINUED | OUTPATIENT
Start: 2018-01-18 | End: 2018-01-19

## 2018-01-18 RX ORDER — CHLORAL HYDRATE 500 MG
2000 CAPSULE ORAL 2 TIMES DAILY
Status: DISCONTINUED | OUTPATIENT
Start: 2018-01-18 | End: 2018-01-18 | Stop reason: RX

## 2018-01-18 NOTE — TELEPHONE ENCOUNTER
ER right now. Numbness in the arm this am.  Could not sleep last night. Co-workers noticed slurred speech and sent him home. Pt took train back home from [facility], and wife drove him directly to [facility]. Daughter Nicolasa Mesa is not on HIPPA.     Wanted to

## 2018-01-18 NOTE — TELEPHONE ENCOUNTER
ER record reviewed, pt likely will be admitted for observation of TIA symptoms, he is known to be at risk, had significant past CAD and one carotid occlusion. Will cancel tomorrow's visit and have pt managed by [id number] [id number] neuro physicians.  Discussed

## 2018-01-18 NOTE — ED PROVIDER NOTES
Patient Seen in: [facility] Emergency Department    History   Patient presents with:  Numbness Weakness (neurologic)    Stated Complaint: right hand numbness/weakness, dizziness episode starting at 80 with some diffi*    HPI    64-year-old male com Years: 0.00         Quit date: 1/28/2008  Smokeless tobacco: Never Used                      Comment: 7-8 yrs ago  Alcohol use: Yes           0.0 oz/week     Comment: occ, up to 6 a day every 1-2 weeks.        Review of Systems    Positive for stated compla these tests on the individual orders. RAINBOW DRAW BLUE   RAINBOW DRAW LAVENDER   RAINBOW DRAW LIGHT GREEN   RAINBOW DRAW GOLD   CBC W/ DIFFERENTIAL     EKG    Rate, intervals and axes as noted on EKG Report.   Rate: 54  Rhythm: Sinus bradycardia  Reading right arm pain. CONCLUSION:  Heart size and pulmonary vascularity is within normal limits. No acute infiltrate, consolidation, effusion or pneumothorax.     Dictated by: Chris Tamez MD on 1/18/2018 at 13:09     Approved by: Chris Tamez MD

## 2018-01-18 NOTE — ED INITIAL ASSESSMENT (HPI)
Pt noted bilateral leg weakness and right arm numbness and expressive aphasia since last night. Pt states had difficulty using the mouse at work today. Pt denies c/o pain.   Pt states started last night, pt states legs felt \"rubbery\"

## 2018-01-19 ENCOUNTER — APPOINTMENT (OUTPATIENT)
Dept: CV DIAGNOSTICS | Facility: HOSPITAL | Age: 58
End: 2018-01-19
Attending: INTERNAL MEDICINE
Payer: COMMERCIAL

## 2018-01-19 VITALS
DIASTOLIC BLOOD PRESSURE: 69 MMHG | RESPIRATION RATE: 20 BRPM | WEIGHT: 216.38 LBS | BODY MASS INDEX: 29.31 KG/M2 | HEART RATE: 50 BPM | OXYGEN SATURATION: 96 % | HEIGHT: 72 IN | TEMPERATURE: 98 F | SYSTOLIC BLOOD PRESSURE: 127 MMHG

## 2018-01-19 LAB
ATRIAL RATE: 54 BPM
BASOPHILS # BLD AUTO: 0.04 X10(3) UL (ref 0–0.1)
BASOPHILS NFR BLD AUTO: 0.5 %
BUN BLD-MCNC: 19 MG/DL (ref 8–20)
CALCIUM BLD-MCNC: 9.3 MG/DL (ref 8.3–10.3)
CHLORIDE: 106 MMOL/L (ref 101–111)
CO2: 25 MMOL/L (ref 22–32)
CREAT BLD-MCNC: 1 MG/DL (ref 0.7–1.3)
EOSINOPHIL # BLD AUTO: 0.29 X10(3) UL (ref 0–0.3)
EOSINOPHIL NFR BLD AUTO: 3.5 %
ERYTHROCYTE [DISTWIDTH] IN BLOOD BY AUTOMATED COUNT: 12.2 % (ref 11.5–16)
GLUCOSE BLD-MCNC: 108 MG/DL (ref 70–99)
GLUCOSE BLD-MCNC: 97 MG/DL (ref 65–99)
GLUCOSE BLD-MCNC: 98 MG/DL (ref 65–99)
HAV IGM SER QL: 2.2 MG/DL (ref 1.7–3)
HCT VFR BLD AUTO: 43.6 % (ref 37–53)
HGB BLD-MCNC: 14.9 G/DL (ref 13–17)
IMMATURE GRANULOCYTE COUNT: 0.02 X10(3) UL (ref 0–1)
IMMATURE GRANULOCYTE RATIO %: 0.2 %
LYMPHOCYTES # BLD AUTO: 2.43 X10(3) UL (ref 0.9–4)
LYMPHOCYTES NFR BLD AUTO: 29.3 %
MCH RBC QN AUTO: 29.3 PG (ref 27–33.2)
MCHC RBC AUTO-ENTMCNC: 34.2 G/DL (ref 31–37)
MCV RBC AUTO: 85.8 FL (ref 80–99)
MONOCYTES # BLD AUTO: 0.62 X10(3) UL (ref 0.1–0.6)
MONOCYTES NFR BLD AUTO: 7.5 %
NEUTROPHIL ABS PRELIM: 4.9 X10 (3) UL (ref 1.3–6.7)
NEUTROPHILS # BLD AUTO: 4.9 X10(3) UL (ref 1.3–6.7)
NEUTROPHILS NFR BLD AUTO: 59 %
P AXIS: 60 DEGREES
P-R INTERVAL: 170 MS
PLATELET # BLD AUTO: 224 10(3)UL (ref 150–450)
POTASSIUM SERPL-SCNC: 4 MMOL/L (ref 3.6–5.1)
POTASSIUM SERPL-SCNC: 4 MMOL/L (ref 3.6–5.1)
Q-T INTERVAL: 444 MS
QRS DURATION: 94 MS
QTC CALCULATION (BEZET): 421 MS
R AXIS: 6 DEGREES
RBC # BLD AUTO: 5.08 X10(6)UL (ref 4.3–5.7)
RED CELL DISTRIBUTION WIDTH-SD: 38 FL (ref 35.1–46.3)
SODIUM SERPL-SCNC: 139 MMOL/L (ref 136–144)
T AXIS: 24 DEGREES
VENTRICULAR RATE: 54 BPM
WBC # BLD AUTO: 8.3 X10(3) UL (ref 4–13)

## 2018-01-19 PROCEDURE — 93306 TTE W/DOPPLER COMPLETE: CPT | Performed by: INTERNAL MEDICINE

## 2018-01-19 PROCEDURE — 95816 EEG AWAKE AND DROWSY: CPT | Performed by: OTHER

## 2018-01-19 PROCEDURE — 99243 OFF/OP CNSLTJ NEW/EST LOW 30: CPT | Performed by: OTHER

## 2018-01-19 PROCEDURE — 99217 OBSERVATION CARE DISCHARGE: CPT | Performed by: INTERNAL MEDICINE

## 2018-01-19 RX ORDER — ASPIRIN 81 MG/1
81 TABLET, CHEWABLE ORAL DAILY
Status: DISCONTINUED | OUTPATIENT
Start: 2018-01-20 | End: 2018-01-19

## 2018-01-19 RX ORDER — CLOPIDOGREL BISULFATE 75 MG/1
75 TABLET ORAL DAILY
Status: DISCONTINUED | OUTPATIENT
Start: 2018-01-19 | End: 2018-01-19

## 2018-01-19 RX ORDER — CLOPIDOGREL BISULFATE 75 MG/1
75 TABLET ORAL DAILY
Qty: 30 TABLET | Refills: 3 | Status: ON HOLD | OUTPATIENT
Start: 2018-01-19 | End: 2021-04-14

## 2018-01-19 RX ORDER — ATORVASTATIN CALCIUM 20 MG/1
20 TABLET, FILM COATED ORAL NIGHTLY
Status: DISCONTINUED | OUTPATIENT
Start: 2018-01-19 | End: 2018-01-19

## 2018-01-19 RX ORDER — CLONAZEPAM 0.5 MG/1
0.5 TABLET ORAL NIGHTLY PRN
Qty: 20 TABLET | Refills: 0 | Status: SHIPPED | OUTPATIENT
Start: 2018-01-19 | End: 2018-01-26

## 2018-01-19 NOTE — HISTORICAL OFFICE NOTE
Reina Caputo  : 1960  ACCOUNT:  [id number]  [phone]  PCP: Dr. Antonio Klein     TODAY'S DATE: 2017  DICTATED BY:  Jesse Flores M.D. ]    CHIEF COMPLAINT: [Followup of Abnormal UFCT, Followup of Mixed hyperlipidemia and Followup of Occ EYES: conjunctivae not injected and no xanthelasma. ENT: mucosa pink and moist. NECK: CEA scar R neck. RESP: respirations with normal rate and rhythm, clear to auscultation. GI: no masses, tenderness or hepatosplenomegaly, rectal deferred.  MS: adequate gai TABLET BY MOUTH AT BEDTIME           02/08/16 *Lisinopril           40MG      1 TABLET DAILY.                           10/04/12 Aspirin               325MG     1 daily         SHADY Ochoa/aazlea  D: 02/20/2017 - T: 03/01/2017 - Job ID# [id number]  C:

## 2018-01-19 NOTE — PROGRESS NOTES
NURSING ADMISSION NOTE      Patient admitted via Cart  Oriented to room. Safety precautions initiated. Bed in low position. Call light in reach. A/Ox4. Very pleasant and cooperative. C/o some mild weakness in R arm.  Denies any numbness or tingli

## 2018-01-19 NOTE — PHYSICAL THERAPY NOTE
PHYSICAL THERAPY QUICK EVALUATION - INPATIENT    Room Number: 3027/2275-X  Evaluation Date: 1/19/2018  Presenting Problem: R UE weakness, slurred speech, difficulty walking  Physician Order: PT Eval and Treat    Problem List  Principal Problem:    Blaise Hi fall risk    WEIGHT BEARING RESTRICTION  Weight Bearing Restriction: None                PAIN ASSESSMENT  Ratin         RANGE OF MOTION AND STRENGTH ASSESSMENT  Upper extremity ROM and strength: see OT evaluation    Lower extremity ROM is within functi Walks 20’, slow speed, abnormal gait pattern, evidence of imbalance. (0)  Severe Impairment: Cannot walk 20’ without assistance, severe gait deviations or imbalance. 2. Change in gait speed: 3  Grading: Ayo the lowest category that applies.   (3)  Nor down, staggers but recovers, can continue to walk. (0) Severe Impairment: Preforms task with severe disruption of gait, i.e., staggers outside 15” path, loses balance, stops, reaches for wall.       Skilled Therapy Provided: Patient presents semi-reclined admission. GOALS  Patient was able to achieve the following goals . ..     Patient was able to transfer At previous, functional level   Patient able to ambulate on level surfaces At previous, functional level

## 2018-01-19 NOTE — H&P
[facility]                                                               History & Physical         [facility] Patient Status:  Observation    1960 MRN [id number]   [facility] 3NE-A Attending Jyotsna Diane MD   [address] [facility] stent   • Carotid stenosis    • Coronary atherosclerosis of native coronary artery    • Esophageal reflux    • Essential hypertension, benign    • Gout    • Hyperlipidemia    • Hypertrophy of prostate with urinary obstruction and other lower urinary tra at 2000   TraZODone HCl 50 MG Oral Tab Take 0.5-1 tablets (25-50 mg total) by mouth nightly. Disp: 90 tablet Rfl: 1 1/17/2018 at 2000   atenolol 25 MG Oral Tab Take 1 tablet (25 mg total) by mouth daily.  Disp: 90 tablet Rfl: 1 1/17/2018 at [facility] 01/18/2018   ALT 26 01/18/2018     Imaging:   XR CHEST AP PORTABLE (CPT=71010)     TECHNIQUE:  AP chest radiograph was obtained. COMPARISON:  [facility] , CHEST PA   LATERAL, 6/29/2012, 6:30.      INDICATIONS:  right hand numbness/weakness, dizziness episod 1. Transient right hand numbness and weakness and aphasia, possible TIA– will monitor on telemetry. TIA protocol. Neurology consult. 2D echo. MRI and MRA of the head and neck. Aspirin 325 mg daily  2.  Carotid stenosis with previous right carotid e

## 2018-01-19 NOTE — DISCHARGE SUMMARY
[facility]  Discharge Summary    Ashley Fan Patient Status:  Observation    1960 MRN [id number]   [facility] 3NE-A Attending John Driscoll MD   Hosp Day # 0 PCP Viji Stevens MD     Date of Admission: 2018    Date follows up with cardiology Dr. Rita Rico regarding this according to patient. Patient has history of previous coronary angioplasty and stent according to patient. Patient denies any chest pain. Denies any shortness of breath.   Denies any associated palpit taking these medications      Instructions Prescription details   Clopidogrel Bisulfate 75 MG Tabs  Commonly known as:  PLAVIX      Take 1 tablet (75 mg total) by mouth daily.    Quantity:  30 tablet  Refills:  3        CONTINUE taking these medications Resp 20   Ht 6' (1.829 m)   Wt 216 lb 6.4 oz (98.2 kg)   SpO2 97%   BMI 29.35 kg/m²       General appearance: alert and cooperative  Head: Normocephalic, without obvious abnormality, atraumatic  Throat: oral mucosa moist  Neck: no adenopathy, no carotid br

## 2018-01-19 NOTE — CONSULTS
[facility]  Report of Consultation    Peri Fisher Patient Status:  Observation    1960 MRN [id number]   [facility] 3NE-A Attending Fei Prnice MD   Hosp Day # 0 PCP Ashley Barbosa MD     Reason for Consultation:  Mireya Bonilla Disorders Associated Other      fam hx   • High Cholesterol Other      fam hx   • Stroke Father    • Cancer Maternal Grandfather    • Heart Disease Brother       reports that he quit smoking about 9 years ago.  He has never used smokeless tobacco. He report (FLEET) 7-19 GM/118ML enema 133 mL, 1 enema, Rectal, Once PRN  •  ondansetron HCl (ZOFRAN) injection 4 mg, 4 mg, Intravenous, Q6H PRN **OR** Metoclopramide HCl (REGLAN) injection 10 mg, 10 mg, Intravenous, Q8H PRN  •  famoTIDine (PEPCID) tab 20 mg, 20 mg, 14.9 01/19/2018   HCT 43.6 01/19/2018   MCV 85.8 01/19/2018   MCH 29.3 01/19/2018   MCHC 34.2 01/19/2018   RDW 12.2 01/19/2018   .0 01/19/2018       Lab Results  Component Value Date   PT 13.9 09/19/2012   PT 14.0 09/18/2012   INR 1.11 01/18/2018

## 2018-01-19 NOTE — PLAN OF CARE
Pt alert and oriented x 4, breathing unlabored on room air, sinus cheryl on the monitor. Denies any headache, dizziness, denies numbness or tinglings on any extremities. SCD on.   PT/OT evaluation pending.      Impaired Swallowing    • Minimize aspiratio

## 2018-01-19 NOTE — SLP NOTE
ADULT SWALLOWING EVALUATION    ASSESSMENT    ASSESSMENT/OVERALL IMPRESSION:  Order received for bedside swallow evaluation per CVA/TIA protocol. Pt is a 58 y/o male admitted due to right arm/leg weakness and difficulty speaking.   MRI negative for CVA:  CO tx  Communication evaluation if word-finding difficulties persist                      Compensatory Strategies Recommended: Slow rate; Alternate consistencies;Small bites and sips  Aspiration Precautions: Upright position; Slow rate;Small bites and sips  Med Motion: Reduced right facial  Rate of Motion: Within Functional Limits    Voice Quality: Clear  Respiratory Status: Unlabored  Consistencies Trialed: Thin liquids; Hard solid  Method of Presentation: Self presentation;Spoon;Cup  Patient Positioning: Upright

## 2018-01-19 NOTE — PLAN OF CARE
Patient/Family Goals    • Patient/Family Long Term Goal Progressing    • Patient/Family Short Term Goal Progressing            Patient is A&Ox4  Denies any pain or discomfort  No n/v/d  Stroke protocol  Bradley KELLEY  Neurochecks q2 then q4 at [place] 1/19  SB

## 2018-01-19 NOTE — PROGRESS NOTES
Pharmacy Note: Dietary Supplement Discontinuation Per Policy    Glucosamine-Chondroitin-MSM and omega-3 fatty acids (fish oil) have been discontinued on [employer] per policy.  These supplements may be restarted upon discharge using the medication rec

## 2018-01-19 NOTE — CONSULTS
[facility]  Report of Consultation    Carlyn Pooleer Patient Status:  Observation    1960 MRN [id number]   [facility] 3NE-A Attending Paula Maloney MD   Hosp Day # 0 PCP Narciso Velásquez MD     Reason for Consultation:  KARIN MCGRATH w Father had stroke at age 62s and was very active person.       In ED, BP was up to 193/83 on arrival     Otherwise, patient denies any recent weight change, fevers, chills, nausea, double vision/ blurry vision / loss of vision, chest pain, palpitations, s Anxiety. Disp:  Rfl:  Past Week at PRN   Multiple Vitamin (TAB-A-ZULAY) Oral Tab Take 1 tablet by mouth daily. Disp:  Rfl:  1/17/2018 at 0900   Glucosa-Chondr-Na Chondr--105-077-65 MG Oral Tab Take 1 tablet by mouth daily.  Disp:  Rfl:  1/18/2018 at 0 mcg/min, Intravenous, Continuous PRN  •  aspirin 300 MG rectal suppository 300 mg, 300 mg, Rectal, Daily **OR** aspirin tab 325 mg, 325 mg, Oral, Daily  •  Senna (SENOKOT) tab TABS 17.2 mg, 17.2 mg, Oral, Nightly  •  docusate sodium (COLACE) cap 100 mg, 10 throughout, tone normal  DTR: 2+ symmetric throughout, toes downgoing bilaterally, no clonus  Sensory: intact to light touch, pinprick, vibration and proprioception  Coord: FNF and HKS intact with no tremor or dysmetria; rapid alternating movements intact 1/18/2018  CONCLUSION:  Heart size and pulmonary vascularity is within normal limits. No acute infiltrate, consolidation, effusion or pneumothorax.     Dictated by: Ronnie Dye MD on 1/18/2018 at 13:09     Approved by: Ronnie Dye MD

## 2018-01-19 NOTE — PROCEDURES
[address] EEG report    Name: Veronica Santana    Date of Study: 1/19/2018      Routine EEG Report    Ordering Physician: Manoj Holm MD                               Primary Care Physician: MD Wang Otoole injection 4 mg 4 mg Intravenous Q6H PRN   Or      Metoclopramide HCl (REGLAN) injection 10 mg 10 mg Intravenous Q8H PRN   famoTIDine (PEPCID) tab 20 mg 20 mg Oral BID   Or      famoTIDine (PEPCID) injection 20 mg 20 mg Intravenous BID   [COMPLETED] Padmini

## 2018-01-19 NOTE — PROGRESS NOTES
[id number] [facility] Neu[facility] Preliminary Note    Mandy Mckenzie Patient Status:  Observation    1960 MRN [id number]   [facility] 3NE-A Attending Joe Mcneill MD   Hosp Day # 0 PCP Alla Bustillo MD     REASON FOR EVALUATION: denies any history of seizure disorders, chronic back or neck pain or prior stroke. NIHSS completed last night = 0. Social:  Patient is employed at the [employer] where he has worked for the last 30 years.  He denies excessive alcohol consu mg Oral Nightly   ClonazePAM (KLONOPIN) tab 0.5 mg 0.5 mg Oral Nightly PRN   multivitamin (ADULT) tab 1 tablet 1 tablet Oral Daily   lisinopril (PRINIVIL,ZESTRIL) tab 20 mg 20 mg Oral Nightly   acetaminophen (TYLENOL) tab 650 mg 650 mg Oral Q4H PRN   Or Location: Left arm)   Pulse (!) 45   Temp 97.8 °F (36.6 °C) (Oral)   Resp 20   Ht 72\"   Wt 216 lb 6.4 oz   SpO2 97%   BMI 29.35 kg/m²   GENERAL:  Patient is a 59year old male in no acute distress.   HEENT:  Normocephalic, atraumatic  LUNGS: Clear to auscu Cholesterol      >45 mg/dL 34 (L)   LDL Cholesterol Calc      <130 mg/dL 61   VLDL      5 - 40 mg/dL 30   T.  CHOL/HDL RATIO      <4.97 3.68   NON HDL CHOL      <130 mg/dL 91     IMAGING:  [facility] 1/18/18 - no acute findings identified    MRI BRAIN / MRA Brain

## 2018-01-19 NOTE — OCCUPATIONAL THERAPY NOTE
OCCUPATIONAL THERAPY QUICK EVALUATION - INPATIENT    Room Number: 2372/9046-I  Evaluation Date: 1/19/2018     Type of Evaluation: Initial  Presenting Problem: TIA    Physician Order: IP Consult to Occupational Therapy  Reason for Therapy:  ADL/IADL Dysfunc better. \"    Patient self-stated goal is to go home    OBJECTIVE  Precautions: None  Fall Risk: Standard fall risk    WEIGHT BEARING RESTRICTION  Weight Bearing Restriction: None                PAIN ASSESSMENT  Ratin  Location: no pain       COGNITION deficits impact the patient’s ability to participate in ADLs, instrumental activities of daily living, rest and sleep, work, leisure and social participation.      Patient Complexity  Occupational Profile/Medical History  LOW - Brief history including revie

## 2018-01-19 NOTE — PAYOR COMM NOTE
--------------  ADMISSION REVIEW     Payor: [facility]  Subscriber #:  [id number]  Authorization Number: N/A    Admit date: N/A  Admit time: N/A       Admitting Physician: Joya Aguilar MD  Attending Physician:  Joya Aguilar MD  Primary Care Physician: CAD (coronary artery disease)     stent   • Coronary atherosclerosis of native coronary artery    • Esophageal reflux    • Essential hypertension, benign    • Gout    • Hyperlipidemia    • Hypertrophy of prostate with urinary obstruction and other lower ur 54  Rhythm: Sinus bradycardia  Reading: Agreed[MP.1]    Ct Brain Or Head (44409)  Result Date: 1/18/2018  PROCEDURE:  CT BRAIN OR HEAD (57618)  COMPARISON:  None.   INDICATIONS:  right hand numbness/weakness, dizziness episode starting at 930 with some diff Approved by: Kirk Gardner MD[MP.3]          ED Course as of Jan 18 1519  ------------------------------------------------------------[MP. 2]  I discussed this case with the patient's physician as well as neurology patient be admitted for further workup of he woke up today he was feeling good and he went to work today, was at a meeting at work and noticed that he had word finding difficulty. Patient states that he used to work for almost 30 years he could not remember.   Patient has history of carotid stenos completed. Pertinent positives and negatives noted in the the HPI. Physical Exam:     Vital signs: Blood pressure (!) 162/71, pulse 50, temperature 97.6 °F (36.4 °C), temperature source Oral, resp.  rate 18, height 6' (1.829 m), weight 216 lb 6.4 oz (98 infiltrate, consolidation, effusion or pneumothorax. CT BRAIN OR HEAD (63877)  COMPARISON:  None.   INDICATIONS:  right hand numbness/weakness, dizziness episode starting at 930 with some difficulties speaking, improving since 930  PATIENT STATED User    1/18/2018 2100 Given 80 mg Oral Toshia Juarez, RN      famoTIDine (PEPCID) tab 20 mg     Date Action Dose Route User    1/18/2018 2100 Given 20 mg Oral Toshia Juarez RN      lisinopril (PRINIVIL,ZESTRIL) tab 20 mg     Date Action Dose Ro

## 2018-01-20 NOTE — PROGRESS NOTES
Discharge order written. Discharge instruction reviewed with pt, pt's daughter and pt's spouse at bedside, verbalized understanding. Peripheral IV removed safely. Tele monitor returned. Transport requested.      @1900 Pt left floor per transport accompanie

## 2018-01-22 ENCOUNTER — PATIENT OUTREACH (OUTPATIENT)
Dept: CASE MANAGEMENT | Age: 58
End: 2018-01-22

## 2018-01-22 DIAGNOSIS — G45.9 TRANSIENT CEREBRAL ISCHEMIA, UNSPECIFIED TYPE: ICD-10-CM

## 2018-01-22 NOTE — PROGRESS NOTES
Initial Post Discharge Follow Up   Discharge Date: 1/19/18  Contact Date: 1/22/2018    Consent Verification:  Assessment Completed With: Patient  HIPAA Verified?   Yes    Discharge Dx:    Transient right hand numbness,weakness and aphasia, possible TIA TraZODone HCl 50 MG Oral Tab Take 0.5-1 tablets (25-50 mg total) by mouth nightly. Disp: 90 tablet Rfl: 1   atenolol 25 MG Oral Tab Take 1 tablet (25 mg total) by mouth daily.  Disp: 90 tablet Rfl: 1   Atorvastatin Calcium (LIPITOR) 20 MG Oral Tab Take 1 Appointment Department [facility])    Jan 23, 2018  1:30 PM CST Hospital/SNF F/U with MD Jose [address]jandro [address], [place] ([address] [place] [address])    Feb 09, 2018  9:30 AM CST Physical - Established Patient with [facility]

## 2018-01-23 ENCOUNTER — OFFICE VISIT (OUTPATIENT)
Dept: FAMILY MEDICINE CLINIC | Facility: CLINIC | Age: 58
End: 2018-01-23

## 2018-01-23 VITALS
HEIGHT: 70.25 IN | TEMPERATURE: 98 F | WEIGHT: 217 LBS | SYSTOLIC BLOOD PRESSURE: 130 MMHG | DIASTOLIC BLOOD PRESSURE: 84 MMHG | BODY MASS INDEX: 31.07 KG/M2 | HEART RATE: 60 BPM | RESPIRATION RATE: 17 BRPM

## 2018-01-23 DIAGNOSIS — G47.00 INSOMNIA, UNSPECIFIED TYPE: ICD-10-CM

## 2018-01-23 DIAGNOSIS — I67.1 INTRACRANIAL ANEURYSM: ICD-10-CM

## 2018-01-23 DIAGNOSIS — Z13.29 SCREENING FOR THYROID DISORDER: ICD-10-CM

## 2018-01-23 DIAGNOSIS — F41.1 GENERALIZED ANXIETY DISORDER: ICD-10-CM

## 2018-01-23 DIAGNOSIS — G47.9 SLEEP DISORDER: ICD-10-CM

## 2018-01-23 DIAGNOSIS — G45.9 TRANSIENT CEREBRAL ISCHEMIA, UNSPECIFIED TYPE: Primary | ICD-10-CM

## 2018-01-23 PROCEDURE — 99495 TRANSJ CARE MGMT MOD F2F 14D: CPT | Performed by: FAMILY MEDICINE

## 2018-01-23 NOTE — PROGRESS NOTES
HPI:    Mercy Pink is a 59year old male here today for hospital follow up.    He was discharged from Inpatient hospital, [facility] to Home   Admission Date: 1/18/18   Discharge Date: 1/19/18  Hospital Discharge Diagnoses (since 12/24/2017)   No causing a period of insomnia.  Trazodone and clonazepam helped in past. This time started trazodone to try to improve sleep, was also given 7-8 clonazepam which helped but he ran out, tried massage & showers, then insomnia worsened again and had about 5 d n 2 (two) times daily. RaNITidine HCl 150 MG Oral Cap Take 150 mg by mouth every evening. TraZODone HCl 50 MG Oral Tab Take 0.5-1 tablets (25-50 mg total) by mouth nightly. atenolol 25 MG Oral Tab Take 1 tablet (25 mg total) by mouth daily.    Ana Alvarado congestion, sinus pain or ST  LUNGS: denies shortness of breath with exertion. btronchitis hx, 2x/year usually.    CARDIOVASCULAR: denies chest pain on exertion or palpitations  GI: denies abdominal pain, denies heartburn, last 2 d diarrhea, took stool soft ischemia, unspecified type--no sign of completed CVA. Refer back to Dr. Sheldon Elaine.  -     NEURO - INTERNAL    Insomnia, unspecified type--episodic and severe, related to anxiety but also perhaps a sleep disorder.  Past hx sleep apnea, symptomatically better

## 2018-01-23 NOTE — PATIENT INSTRUCTIONS
Plan; refer to neurology followup for TIA and sleep disturbance, and pulmonary to discuss possible sleep study (MSLT). Ask Dr. Tyshawn Britt if you need the CTA of the brain and have him order it.      Refer psychiatry to discuss possible psychiatric cause of yo surfaces when out in the community. ? Take care when walking on gravel or grassy surfaces. ? Avoid walking on snowy or icy surfaces. ? Use a cane or walker (indoors and out) if you are unsteady on your feet.

## 2018-01-25 ENCOUNTER — PATIENT MESSAGE (OUTPATIENT)
Dept: FAMILY MEDICINE CLINIC | Facility: CLINIC | Age: 58
End: 2018-01-25

## 2018-01-25 ENCOUNTER — TELEPHONE (OUTPATIENT)
Dept: FAMILY MEDICINE CLINIC | Facility: CLINIC | Age: 58
End: 2018-01-25

## 2018-01-25 NOTE — TELEPHONE ENCOUNTER
Information from [employer] . Patient was advised for out patient psych Rx management. Also he was given some referrals.

## 2018-01-25 NOTE — TELEPHONE ENCOUNTER
From: Veronica Santana  To: Rita Carrillo MD  Sent: 1/25/2018 12:46 PM CST  Subject: Visit Bety Host Dr. Carleen Cuenca,  I had my psyche assessment today. They are not able to see me until April.  I believe I will be OK with clonazepam I have,

## 2018-01-26 NOTE — PROGRESS NOTES
Larry Cleaning IS A 59year old male HERE FOR Patient presents with: Anxiety: Room 4.  Talk about meds        History of present illness:     Tried Prozac, remeron & sertraline in past. Felt much better on Prozac, \"like chemical spirituality,\" anxiety tablet by mouth daily. Disp:  Rfl:    lisinopril 20 MG Oral Tab Take 20 mg by mouth daily. Disp:  Rfl:    omega-3 fatty acids 1000 MG Oral Cap Take 2,000 mg by mouth 2 (two) times daily.  Disp:  Rfl:    RaNITidine HCl 150 MG Oral Cap Take 150 mg by mouth ev Yes    Seat Belt Yes    Self-Exams No     Social History Narrative     with [employer] , 3 children. Review of systems:     See HPI.     Exam:     /64   Pulse 52   Temp 98.1 °F (36.7 °C)   Resp 16   Ht 70.25\"   Wt 217 lb   BMI 30

## 2018-01-26 NOTE — PATIENT INSTRUCTIONS
Start fluoxetine 20 mg daily (generic Prozac), take in morning. Clonazepam 1 every other day to every day. Recheck 3-4 weeks for followup on meds.  Could consider venlafaxine or duloxetine as alternative, or escitalopram  (Lexapro)    Taper trazodone by 25

## 2018-01-31 ENCOUNTER — OFFICE VISIT (OUTPATIENT)
Dept: NEUROLOGY | Facility: CLINIC | Age: 58
End: 2018-01-31

## 2018-01-31 VITALS
DIASTOLIC BLOOD PRESSURE: 78 MMHG | RESPIRATION RATE: 16 BRPM | HEIGHT: 70.25 IN | WEIGHT: 217 LBS | BODY MASS INDEX: 31.07 KG/M2 | SYSTOLIC BLOOD PRESSURE: 116 MMHG | HEART RATE: 80 BPM

## 2018-01-31 DIAGNOSIS — G45.9 TRANSIENT CEREBRAL ISCHEMIA, UNSPECIFIED TYPE: Primary | ICD-10-CM

## 2018-01-31 DIAGNOSIS — I67.1 UNRUPTURED CEREBRAL ANEURYSM: ICD-10-CM

## 2018-01-31 PROCEDURE — 99214 OFFICE O/P EST MOD 30 MIN: CPT | Performed by: OTHER

## 2018-01-31 NOTE — PROGRESS NOTES
[facility] Progress Note    HPI    Patient presents with:  Neurologic Problem: TIA      As per my initial consult note, 1/19/18:    \"  Jose Luis Laws is a a(n) 59year old male, with PMHx significant for HTN, HL, CAD s/p stent and R fevers, chills, nausea, double vision/ blurry vision / loss of vision, chest pain, palpitations, shortness of breath, rashes, joint pains, bowel / bladder incontinence or mood issues.  \"          Patient now presents in follow up - since discharge, he has ENDARTERECTOMY      Comment: 5 yrs ago per pt  No date: OTHER SURGICAL HISTORY  Family History   Problem Relation Age of Onset   • GI cancer[other] [OTHER] Other      granfather, live GB panc   • Heart Attack Other      fam hx   • Depression Other      fam 20 MG Oral Cap, Take 1 capsule (20 mg total) by mouth daily. , Disp: 30 capsule, Rfl: 1  •  ClonazePAM 0.5 MG Oral Tab, Take 1 tablet (0.5 mg total) by mouth nightly as needed for Anxiety. , Disp: 30 tablet, Rfl: 1  •  Clopidogrel Bisulfate 75 MG Oral Tab, T otherwise, 2-12 intact  Motor: 5/5 strength throughout, tone normal  DTR: 2+ symmetric throughout, toes downgoing bilaterally, no clonus  Sensory: intact to light touch throughout  Coord: FNF intact with no tremor or dysmetria; rapid alternating movements intracranial abnormality identified. Other procedures:   None new    Prior as noted below:    EEG (1/19/18): Impression:    This EEG is normal.  No epileptiform activity, abnormal slowing   or clinical or electrographic seizures were noted on this awak speech, numbness/tingling/weakness on one side of the body, confusion, double vision, loss of vision, vertigo,       (G45.9) Transient cerebral ischemia, unspecified type  (primary encounter diagnosis)  Plan: CTA BRAIN+CTA CAROTIDS (CONTRAST         ONLY)(

## 2018-01-31 NOTE — PATIENT INSTRUCTIONS
Have imaging done at your convenience   Follow up in ~3 months  Continue Plavix, ASA and statin  Refill policies:    • Allow 2-3 business days for refills; controlled substances may take longer.   • Contact your pharmacy at least 5 days prior to running out amount billed. Precertification and Prior Authorizations  If your physician has recommended that you have a procedure or additional testing performed.   [facility]) will contact your insurance carrier to obtain pre-certification

## 2018-02-06 ENCOUNTER — HOSPITAL ENCOUNTER (OUTPATIENT)
Dept: CT IMAGING | Facility: HOSPITAL | Age: 58
Discharge: HOME | End: 2018-02-06
Attending: Other
Payer: COMMERCIAL

## 2018-02-06 DIAGNOSIS — G45.9 TRANSIENT CEREBRAL ISCHEMIA, UNSPECIFIED TYPE: ICD-10-CM

## 2018-02-06 DIAGNOSIS — I67.1 UNRUPTURED CEREBRAL ANEURYSM: ICD-10-CM

## 2018-02-06 PROCEDURE — 70498 CT ANGIOGRAPHY NECK: CPT | Performed by: OTHER

## 2018-02-06 PROCEDURE — 70496 CT ANGIOGRAPHY HEAD: CPT | Performed by: OTHER

## 2018-03-05 ENCOUNTER — PRIOR ORIGINAL RECORDS (OUTPATIENT)
Dept: OTHER | Age: 58
End: 2018-03-05

## 2018-03-05 ENCOUNTER — MYAURORA ACCOUNT LINK (OUTPATIENT)
Dept: OTHER | Age: 58
End: 2018-03-05

## 2018-03-07 LAB
ALBUMIN: 4.3 G/DL
ALKALINE PHOSPHATATE(ALK PHOS): 56 IU/L
BILIRUBIN TOTAL: 1 MG/DL
BUN: 22 MG/DL
CALCIUM: 9.2 MG/DL
CHLORIDE: 104 MEQ/L
CHOLESTEROL, TOTAL: 125 MG/DL
CREATININE, SERUM: 1.07 MG/DL
GLUCOSE: 102 MG/DL
HDL CHOLESTEROL: 34 MG/DL
HEMATOCRIT: 43.9 %
HEMOGLOBIN A1C: 5.8 %
HEMOGLOBIN: 15.1 G/DL
LDL CHOLESTEROL: 61 MG/DL
PLATELETS: 237 K/UL
POTASSIUM, SERUM: 3.8 MEQ/L
PROTEIN, TOTAL: 7.3 G/DL
RED BLOOD COUNT: 5.12 X 10-6/U
SGOT (AST): 14 IU/L
SGPT (ALT): 26 IU/L
SODIUM: 138 MEQ/L
TRIGLYCERIDES: 150 MG/DL
WHITE BLOOD COUNT: 9 X 10-3/U

## 2018-03-12 ENCOUNTER — MED REC SCAN ONLY (OUTPATIENT)
Dept: FAMILY MEDICINE CLINIC | Facility: CLINIC | Age: 58
End: 2018-03-12

## 2018-03-13 RX ORDER — RANITIDINE 150 MG/1
CAPSULE ORAL
Qty: 90 CAPSULE | Refills: 0 | Status: SHIPPED | OUTPATIENT
Start: 2018-03-13 | End: 2018-06-09

## 2018-03-13 NOTE — TELEPHONE ENCOUNTER
LOV   1/18 4/16/2018    LAST LAB    LAST RX   RANITIDINE  MG Oral Cap (Discontinued) 90 capsule 0 12/12/2017         PROTOCOL  Gastrointestinal Medication Protocol Passed    Refilled x 3 months.

## 2018-04-10 DIAGNOSIS — I10 ESSENTIAL HYPERTENSION, BENIGN: ICD-10-CM

## 2018-04-11 RX ORDER — LISINOPRIL 20 MG/1
TABLET ORAL
Qty: 30 TABLET | Refills: 0 | Status: SHIPPED | OUTPATIENT
Start: 2018-04-11 | End: 2018-05-12

## 2018-04-11 NOTE — TELEPHONE ENCOUNTER
LOV  4/16/2018    LAST LAB 1/18    LAST RX   LISINOPRIL 20MG TABLETS 03/12/2018 01/08/2018   30         PROTOCOL  Hypertension Medications Protocol Passed    Has enough until 4/12. Refilled x 30 days. Next refill at his appointment.

## 2018-04-16 ENCOUNTER — OFFICE VISIT (OUTPATIENT)
Dept: FAMILY MEDICINE CLINIC | Facility: CLINIC | Age: 58
End: 2018-04-16

## 2018-04-16 VITALS
WEIGHT: 214.81 LBS | HEART RATE: 42 BPM | HEIGHT: 70.25 IN | TEMPERATURE: 99 F | SYSTOLIC BLOOD PRESSURE: 124 MMHG | OXYGEN SATURATION: 72 % | DIASTOLIC BLOOD PRESSURE: 78 MMHG | BODY MASS INDEX: 30.75 KG/M2 | RESPIRATION RATE: 16 BRPM

## 2018-04-16 DIAGNOSIS — Z11.59 ENCOUNTER FOR HEPATITIS C SCREENING TEST FOR LOW RISK PATIENT: ICD-10-CM

## 2018-04-16 DIAGNOSIS — E78.2 MIXED HYPERLIPIDEMIA: ICD-10-CM

## 2018-04-16 DIAGNOSIS — Z00.00 WELL ADULT EXAM: Primary | ICD-10-CM

## 2018-04-16 DIAGNOSIS — N13.8 BPH WITH OBSTRUCTION/LOWER URINARY TRACT SYMPTOMS: ICD-10-CM

## 2018-04-16 DIAGNOSIS — L57.0 ACTINIC KERATOSIS: ICD-10-CM

## 2018-04-16 DIAGNOSIS — N40.1 BPH WITH OBSTRUCTION/LOWER URINARY TRACT SYMPTOMS: ICD-10-CM

## 2018-04-16 DIAGNOSIS — E11.9 CONTROLLED TYPE 2 DIABETES MELLITUS WITHOUT COMPLICATION, WITHOUT LONG-TERM CURRENT USE OF INSULIN (HCC): ICD-10-CM

## 2018-04-16 DIAGNOSIS — K21.9 GASTROESOPHAGEAL REFLUX DISEASE, ESOPHAGITIS PRESENCE NOT SPECIFIED: ICD-10-CM

## 2018-04-16 PROCEDURE — 90715 TDAP VACCINE 7 YRS/> IM: CPT | Performed by: FAMILY MEDICINE

## 2018-04-16 PROCEDURE — 99213 OFFICE O/P EST LOW 20 MIN: CPT | Performed by: FAMILY MEDICINE

## 2018-04-16 PROCEDURE — 99396 PREV VISIT EST AGE 40-64: CPT | Performed by: FAMILY MEDICINE

## 2018-04-16 PROCEDURE — 90471 IMMUNIZATION ADMIN: CPT | Performed by: FAMILY MEDICINE

## 2018-04-16 RX ORDER — MIRTAZAPINE 15 MG/1
TABLET, FILM COATED ORAL
COMMUNITY
Start: 2018-04-11 | End: 2018-10-22 | Stop reason: SINTOL

## 2018-04-16 RX ORDER — BACITRACIN 500 UNIT/G
3 OINTMENT (GRAM) TOPICAL DAILY
COMMUNITY
End: 2018-12-21

## 2018-04-16 NOTE — PATIENT INSTRUCTIONS
Call Dr. Regla Tatum office to schedule/order the TERRENCE, see him or associate for followup on it. If the saw palmetto doesn't help within another 4-5 weeks, call and we can start tamsulosin to help relax urethra and improve urinary flow.  You may benefit al

## 2018-04-16 NOTE — PROGRESS NOTES
Renetta Ayala is a 59year old male here for Patient presents with:   Well Adult: Physical   .    HPI:    Patient complains of here for well exam.  Current concerns:    Saw psychiatrist ([facility]) Dr. Yessenia Houston put pt on 7.5 mg mirtazapine for nigh tablet Rfl: 0   RANITIDINE  MG Oral Cap TAKE 1 CAPSULE(150 MG) BY MOUTH DAILY Disp: 90 capsule Rfl: 0   FLUoxetine HCl 20 MG Oral Cap Take 1 capsule (20 mg total) by mouth daily.  Disp: 30 capsule Rfl: 1   ClonazePAM 0.5 MG Oral Tab Take 1 tablet (0.  Service Yes    Blood Transfusions No    Caffeine Concern Yes    Comment: 1 big cup coffee a day    Occupational Exposure No    Hobby Hazards No    Sleep Concern Yes    Comment: using melatonin    Stress Concern No    Weight Concern Yes    Comme Wt 214 lb 12.8 oz   SpO2 (!) 72%   BMI 30.60 kg/m²      General Appearance:   relaxed  Alert, cooperative, no distress, appears stated age   Head:    Normocephalic, without obvious abnormality, atraumatic   Eyes:    PERRL, conjunctiva/corneas clear, EOM' 01/18/2018 444  ms Final   • QTC Calculation (Bezet) 01/18/2018 421  ms Final   • P Axis 01/18/2018 60  degrees Final   • R Axis 01/18/2018 6  degrees Final   • T Axis 01/18/2018 24  degrees Final   • Hold Blue 01/18/2018 Auto Resulted   Final   • Hold Lav 0.00 - 0.30 x10(3) uL Final   • Basophil Absolute 01/18/2018 0.04  0.00 - 0.10 x10(3) uL Final   • Immature Granulocyte Absolute 01/18/2018 0.01  0.00 - 1.00 x10(3) uL Final   • Neutrophil % 01/18/2018 66.6  % Final   • Lymphocyte % 01/18/2018 24.3  % Rachelle Eosinophil Absolute 01/19/2018 0.29  0.00 - 0.30 x10(3) uL Final   • Basophil Absolute 01/19/2018 0.04  0.00 - 0.10 x10(3) uL Final   • Immature Granulocyte Absolute 01/19/2018 0.02  0.00 - 1.00 x10(3) uL Final   • Neutrophil % 01/19/2018 59.0  % Final   • baby boomers (born [birth year]). PSA screening discouraged unless patient is -American or has a family history of prostate cancer.    Colon cancer screening recommended (colonoscopy once every 10 years, or screening of 3 stool samples on 3 different da

## 2018-04-30 ENCOUNTER — MED REC SCAN ONLY (OUTPATIENT)
Dept: FAMILY MEDICINE CLINIC | Facility: CLINIC | Age: 58
End: 2018-04-30

## 2018-04-30 ENCOUNTER — APPOINTMENT (OUTPATIENT)
Dept: LAB | Age: 58
End: 2018-04-30
Attending: FAMILY MEDICINE
Payer: COMMERCIAL

## 2018-04-30 DIAGNOSIS — N40.1 BPH WITH OBSTRUCTION/LOWER URINARY TRACT SYMPTOMS: ICD-10-CM

## 2018-04-30 DIAGNOSIS — F41.1 GENERALIZED ANXIETY DISORDER: ICD-10-CM

## 2018-04-30 DIAGNOSIS — E78.2 MIXED HYPERLIPIDEMIA: ICD-10-CM

## 2018-04-30 DIAGNOSIS — G47.00 INSOMNIA, UNSPECIFIED TYPE: ICD-10-CM

## 2018-04-30 DIAGNOSIS — Z13.29 SCREENING FOR THYROID DISORDER: ICD-10-CM

## 2018-04-30 DIAGNOSIS — E11.9 CONTROLLED TYPE 2 DIABETES MELLITUS WITHOUT COMPLICATION, WITHOUT LONG-TERM CURRENT USE OF INSULIN (HCC): ICD-10-CM

## 2018-04-30 DIAGNOSIS — N13.8 BPH WITH OBSTRUCTION/LOWER URINARY TRACT SYMPTOMS: ICD-10-CM

## 2018-04-30 DIAGNOSIS — Z11.59 ENCOUNTER FOR HEPATITIS C SCREENING TEST FOR LOW RISK PATIENT: ICD-10-CM

## 2018-04-30 PROCEDURE — 80061 LIPID PANEL: CPT | Performed by: FAMILY MEDICINE

## 2018-04-30 PROCEDURE — 84153 ASSAY OF PSA TOTAL: CPT | Performed by: FAMILY MEDICINE

## 2018-04-30 PROCEDURE — 86803 HEPATITIS C AB TEST: CPT | Performed by: FAMILY MEDICINE

## 2018-04-30 PROCEDURE — 82043 UR ALBUMIN QUANTITATIVE: CPT | Performed by: FAMILY MEDICINE

## 2018-04-30 PROCEDURE — 80053 COMPREHEN METABOLIC PANEL: CPT | Performed by: FAMILY MEDICINE

## 2018-04-30 PROCEDURE — 82570 ASSAY OF URINE CREATININE: CPT | Performed by: FAMILY MEDICINE

## 2018-04-30 PROCEDURE — 83036 HEMOGLOBIN GLYCOSYLATED A1C: CPT | Performed by: FAMILY MEDICINE

## 2018-04-30 PROCEDURE — 84443 ASSAY THYROID STIM HORMONE: CPT | Performed by: FAMILY MEDICINE

## 2018-04-30 PROCEDURE — 36415 COLL VENOUS BLD VENIPUNCTURE: CPT | Performed by: FAMILY MEDICINE

## 2018-05-06 ENCOUNTER — HOSPITAL ENCOUNTER (OUTPATIENT)
Age: 58
Discharge: HOME | End: 2018-05-06
Payer: COMMERCIAL

## 2018-05-06 VITALS
SYSTOLIC BLOOD PRESSURE: 136 MMHG | RESPIRATION RATE: 18 BRPM | DIASTOLIC BLOOD PRESSURE: 62 MMHG | TEMPERATURE: 98 F | HEART RATE: 67 BPM | OXYGEN SATURATION: 97 %

## 2018-05-06 DIAGNOSIS — J20.9 ACUTE BRONCHITIS, UNSPECIFIED ORGANISM: Primary | ICD-10-CM

## 2018-05-06 DIAGNOSIS — R06.2 WHEEZING: ICD-10-CM

## 2018-05-06 PROCEDURE — 94640 AIRWAY INHALATION TREATMENT: CPT

## 2018-05-06 PROCEDURE — 99214 OFFICE O/P EST MOD 30 MIN: CPT

## 2018-05-06 RX ORDER — PREDNISONE 20 MG/1
60 TABLET ORAL ONCE
Status: COMPLETED | OUTPATIENT
Start: 2018-05-06 | End: 2018-05-06

## 2018-05-06 RX ORDER — AZITHROMYCIN 250 MG/1
TABLET, FILM COATED ORAL
Qty: 1 PACKAGE | Refills: 0 | Status: SHIPPED | OUTPATIENT
Start: 2018-05-06 | End: 2018-05-11

## 2018-05-06 RX ORDER — IPRATROPIUM BROMIDE AND ALBUTEROL SULFATE 2.5; .5 MG/3ML; MG/3ML
3 SOLUTION RESPIRATORY (INHALATION) ONCE
Status: COMPLETED | OUTPATIENT
Start: 2018-05-06 | End: 2018-05-06

## 2018-05-06 RX ORDER — ALBUTEROL SULFATE 90 UG/1
2 AEROSOL, METERED RESPIRATORY (INHALATION) EVERY 4 HOURS PRN
Qty: 1 INHALER | Refills: 0 | Status: SHIPPED | OUTPATIENT
Start: 2018-05-06 | End: 2018-06-05

## 2018-05-06 RX ORDER — PREDNISONE 20 MG/1
40 TABLET ORAL DAILY
Qty: 10 TABLET | Refills: 0 | Status: SHIPPED | OUTPATIENT
Start: 2018-05-07 | End: 2018-05-12

## 2018-05-06 RX ORDER — ALBUTEROL SULFATE 2.5 MG/3ML
2.5 SOLUTION RESPIRATORY (INHALATION) EVERY 4 HOURS PRN
Qty: 30 AMPULE | Refills: 0 | Status: SHIPPED | OUTPATIENT
Start: 2018-05-06 | End: 2018-06-05

## 2018-05-06 NOTE — ED NOTES
Pt educated regarding proper use of MDI with spacer. Spacer provided with MDI information. Pt verbalized understanding of instruction. Pt educated regarding medication, peak flows, and neb breathing technique.   Pt verbalizes understanding and demonstrates

## 2018-05-06 NOTE — ED PROVIDER NOTES
Patient Seen in: THE [facility]    History   Patient presents with:  Cough/URI    Stated Complaint: COUGH/CONGESTION     HPI    Dean Medrano is a 64-year-old male who comes in today complaining of bronchitis-like symptoms.   He states he h reviewed. All other systems reviewed and negative except as noted above.     Physical Exam   ED Triage Vitals [05/06/18 1304]  BP: 159/74  Pulse: 73  Resp: 18  Temp: 98.3 °F (36.8 °C)  Temp src: Oral  SpO2: 94 %  O2 Device: None (Room air)    Current:B diagnosis)  Wheezing    Disposition:  Discharge  5/6/2018  1:56 pm    Follow-up:  MD Nannette White [address]     Schedule an appointment as soon as possible for a visit   If symptoms worse

## 2018-05-12 DIAGNOSIS — I10 ESSENTIAL HYPERTENSION, BENIGN: ICD-10-CM

## 2018-05-14 RX ORDER — LISINOPRIL 20 MG/1
TABLET ORAL
Qty: 30 TABLET | Refills: 0 | Status: SHIPPED | OUTPATIENT
Start: 2018-05-14 | End: 2018-06-09

## 2018-05-14 NOTE — TELEPHONE ENCOUNTER
LOV  4/18 Visit date not found      LAST LAB 1/18    LAST RX  LISINOPRIL 20 MG Oral Tab 30 tablet 0 4/11/2018       PROTOCOL  Hypertension Medications Protocol Passed    Refill x 1. Needs refill appointment. Mychart to patient.

## 2018-06-09 DIAGNOSIS — I10 ESSENTIAL HYPERTENSION, BENIGN: ICD-10-CM

## 2018-06-11 RX ORDER — LISINOPRIL 20 MG/1
TABLET ORAL
Qty: 90 TABLET | Refills: 0 | Status: SHIPPED | OUTPATIENT
Start: 2018-06-11 | End: 2018-09-05

## 2018-06-11 RX ORDER — OMEGA-3-ACID ETHYL ESTERS 1 G/1
CAPSULE, LIQUID FILLED ORAL
Qty: 360 CAPSULE | Refills: 0 | Status: SHIPPED | OUTPATIENT
Start: 2018-06-11 | End: 2018-09-25

## 2018-06-11 RX ORDER — RANITIDINE 150 MG/1
CAPSULE ORAL
Qty: 90 CAPSULE | Refills: 0 | Status: SHIPPED | OUTPATIENT
Start: 2018-06-11 | End: 2018-09-05

## 2018-09-05 DIAGNOSIS — I10 ESSENTIAL HYPERTENSION, BENIGN: ICD-10-CM

## 2018-09-06 RX ORDER — LISINOPRIL 20 MG/1
TABLET ORAL
Qty: 30 TABLET | Refills: 0 | Status: SHIPPED | OUTPATIENT
Start: 2018-09-06 | End: 2018-10-14

## 2018-09-06 RX ORDER — RANITIDINE 150 MG/1
CAPSULE ORAL
Qty: 30 CAPSULE | Refills: 0 | Status: SHIPPED | OUTPATIENT
Start: 2018-09-06 | End: 2018-10-22

## 2018-09-06 NOTE — TELEPHONE ENCOUNTER
LISINOPRIL 20MG TABLETS  Will file in chart as: LISINOPRIL 20 MG Oral Tab  TAKE 1 TABLET BY MOUTH DAILY       Disp: 90 tablet Refills: 0    Class: Normal Start: 9/5/2018   For: Essential hypertension, benign  Originally ordered: 1 year ago by [employer]

## 2018-09-26 RX ORDER — OMEGA-3-ACID ETHYL ESTERS 1 G/1
CAPSULE, LIQUID FILLED ORAL
Qty: 360 CAPSULE | Refills: 0 | Status: SHIPPED | OUTPATIENT
Start: 2018-09-26 | End: 2018-10-22

## 2018-09-26 NOTE — TELEPHONE ENCOUNTER
Name from pharmacy: [address] [place] [address] ([employer])          Will file in chart as: OMEGA-3-ACID ETHYL ESTERS 1 g Oral Cap    Sig: TAKE 2 CAPSULES BY MOUTH TWICE DAILY    Disp:  360 capsule    Refills:  0    Start: 9/25/2018     Class: Normal    Requested o

## 2018-10-14 DIAGNOSIS — I10 ESSENTIAL HYPERTENSION, BENIGN: ICD-10-CM

## 2018-10-16 RX ORDER — LISINOPRIL 20 MG/1
TABLET ORAL
Qty: 30 TABLET | Refills: 0 | Status: SHIPPED | OUTPATIENT
Start: 2018-10-16 | End: 2018-10-22

## 2018-10-16 NOTE — TELEPHONE ENCOUNTER
LOV 4/18    LAST LAB 4/18    LAST RX   LISINOPRIL 20 MG Oral Tab 30 tablet 0 9/6/2018     Sig: TAKE 1 TABLET BY MOUTH DAILY          Next OV 10/22/2018    PROTOCOL    Hypertension Medications Protocol Bqrfdu08/14 6:18 PM   Appointment in past 6 or next 3

## 2018-10-22 ENCOUNTER — OFFICE VISIT (OUTPATIENT)
Dept: FAMILY MEDICINE CLINIC | Facility: CLINIC | Age: 58
End: 2018-10-22
Payer: COMMERCIAL

## 2018-10-22 VITALS
SYSTOLIC BLOOD PRESSURE: 142 MMHG | DIASTOLIC BLOOD PRESSURE: 78 MMHG | WEIGHT: 232.63 LBS | OXYGEN SATURATION: 96 % | TEMPERATURE: 98 F | RESPIRATION RATE: 17 BRPM | HEART RATE: 50 BPM | BODY MASS INDEX: 33.3 KG/M2 | HEIGHT: 70.25 IN

## 2018-10-22 DIAGNOSIS — E11.9 CONTROLLED TYPE 2 DIABETES MELLITUS WITHOUT COMPLICATION, WITHOUT LONG-TERM CURRENT USE OF INSULIN (HCC): ICD-10-CM

## 2018-10-22 DIAGNOSIS — I10 ESSENTIAL HYPERTENSION, BENIGN: ICD-10-CM

## 2018-10-22 DIAGNOSIS — K21.9 CHRONIC GERD: Primary | ICD-10-CM

## 2018-10-22 DIAGNOSIS — E78.2 MIXED HYPERLIPIDEMIA: ICD-10-CM

## 2018-10-22 PROCEDURE — 99214 OFFICE O/P EST MOD 30 MIN: CPT | Performed by: FAMILY MEDICINE

## 2018-10-22 RX ORDER — RANITIDINE 150 MG/1
CAPSULE ORAL
Qty: 90 CAPSULE | Refills: 1 | Status: SHIPPED | OUTPATIENT
Start: 2018-10-22 | End: 2019-06-04

## 2018-10-22 RX ORDER — LISINOPRIL 20 MG/1
40 TABLET ORAL
Qty: 180 TABLET | Refills: 0 | Status: SHIPPED | OUTPATIENT
Start: 2018-10-22 | End: 2019-02-01

## 2018-10-22 NOTE — PATIENT INSTRUCTIONS
Increase to 2 pills of lisinopril daily, recheck in 2 months. As weight decreases we may be able to go back to 1 pill daily.     See Dr. Ardeen Fabry and ask for him to order or reorder TERRENCE for followup of your TIA episode, also ask if you need to continue ateno

## 2018-10-22 NOTE — PROGRESS NOTES
Magali Reyes IS A 59year old male HERE FOR Patient presents with:  HTN: Medication refills. Questions about BP medication        History of present illness:     No GERD on ranitidine, but in the last few weeks since he ran out it's come back.  Spicy ko TAKE 1 CAPSULE(150 MG) BY MOUTH DAILY Disp: 90 capsule Rfl: 1   lisinopril 20 MG Oral Tab Take 2 tablets (40 mg total) by mouth once daily.  Disp: 180 tablet Rfl: 0   omeprazole 20 MG Oral Capsule Delayed Release Take 1 capsule (20 mg total) by mouth 3 (thr of children: 3      Years of education: Not on file      Highest education level: Not on file    Social Needs      Financial resource strain: Not on file      Food insecurity - worry: Not on file      Food insecurity - inability: Not on file      Transport feet and the appearance is normal except thick calluses over metatarsals both feet. Bilateral monofilament/sensation of both feet is normal.  Pulsation pedal pulse exam of both lower legs/feet is normal as well.     Test results: none recent      Assessme

## 2018-11-03 VITALS
SYSTOLIC BLOOD PRESSURE: 144 MMHG | DIASTOLIC BLOOD PRESSURE: 80 MMHG | RESPIRATION RATE: 18 BRPM | TEMPERATURE: 98.4 F | OXYGEN SATURATION: 98 % | HEART RATE: 60 BPM

## 2018-11-03 VITALS
BODY MASS INDEX: 31.69 KG/M2 | RESPIRATION RATE: 96 BRPM | TEMPERATURE: 98.3 F | WEIGHT: 234 LBS | HEART RATE: 64 BPM | DIASTOLIC BLOOD PRESSURE: 72 MMHG | HEIGHT: 72 IN | SYSTOLIC BLOOD PRESSURE: 142 MMHG

## 2018-11-05 VITALS
SYSTOLIC BLOOD PRESSURE: 165 MMHG | TEMPERATURE: 98.3 F | RESPIRATION RATE: 18 BRPM | DIASTOLIC BLOOD PRESSURE: 80 MMHG | HEART RATE: 79 BPM | BODY MASS INDEX: 32.51 KG/M2 | WEIGHT: 240 LBS | HEIGHT: 72 IN

## 2018-12-18 ENCOUNTER — APPOINTMENT (OUTPATIENT)
Dept: LAB | Facility: HOSPITAL | Age: 58
End: 2018-12-18
Attending: FAMILY MEDICINE
Payer: COMMERCIAL

## 2018-12-18 DIAGNOSIS — E11.9 CONTROLLED TYPE 2 DIABETES MELLITUS WITHOUT COMPLICATION, WITHOUT LONG-TERM CURRENT USE OF INSULIN (HCC): ICD-10-CM

## 2018-12-18 DIAGNOSIS — E78.2 MIXED HYPERLIPIDEMIA: ICD-10-CM

## 2018-12-18 PROCEDURE — 82570 ASSAY OF URINE CREATININE: CPT

## 2018-12-18 PROCEDURE — 80053 COMPREHEN METABOLIC PANEL: CPT

## 2018-12-18 PROCEDURE — 83036 HEMOGLOBIN GLYCOSYLATED A1C: CPT

## 2018-12-18 PROCEDURE — 80061 LIPID PANEL: CPT

## 2018-12-18 PROCEDURE — 36415 COLL VENOUS BLD VENIPUNCTURE: CPT

## 2018-12-18 PROCEDURE — 82043 UR ALBUMIN QUANTITATIVE: CPT

## 2018-12-21 ENCOUNTER — OFFICE VISIT (OUTPATIENT)
Dept: FAMILY MEDICINE CLINIC | Facility: CLINIC | Age: 58
End: 2018-12-21
Payer: COMMERCIAL

## 2018-12-21 VITALS
SYSTOLIC BLOOD PRESSURE: 130 MMHG | DIASTOLIC BLOOD PRESSURE: 66 MMHG | OXYGEN SATURATION: 98 % | HEIGHT: 70.25 IN | TEMPERATURE: 99 F | RESPIRATION RATE: 17 BRPM | WEIGHT: 234.13 LBS | HEART RATE: 54 BPM | BODY MASS INDEX: 33.52 KG/M2

## 2018-12-21 DIAGNOSIS — I25.10 ATHEROSCLEROSIS OF NATIVE CORONARY ARTERY OF NATIVE HEART WITHOUT ANGINA PECTORIS: ICD-10-CM

## 2018-12-21 DIAGNOSIS — I10 ESSENTIAL HYPERTENSION: ICD-10-CM

## 2018-12-21 DIAGNOSIS — E78.2 MIXED HYPERLIPIDEMIA: ICD-10-CM

## 2018-12-21 DIAGNOSIS — G45.9 TRANSIENT CEREBRAL ISCHEMIA, UNSPECIFIED TYPE: ICD-10-CM

## 2018-12-21 DIAGNOSIS — Z95.5 STATUS POST CORONARY ARTERY STENT PLACEMENT: ICD-10-CM

## 2018-12-21 DIAGNOSIS — E11.9 CONTROLLED TYPE 2 DIABETES MELLITUS WITHOUT COMPLICATION, WITHOUT LONG-TERM CURRENT USE OF INSULIN (HCC): Primary | ICD-10-CM

## 2018-12-21 DIAGNOSIS — F41.1 GENERALIZED ANXIETY DISORDER: ICD-10-CM

## 2018-12-21 PROCEDURE — 99214 OFFICE O/P EST MOD 30 MIN: CPT | Performed by: FAMILY MEDICINE

## 2018-12-21 NOTE — PATIENT INSTRUCTIONS
Continue current meds. Consider diabetes in remission if A1c in prediabetic range in 6 months. Recheck labs in 6 months.

## 2018-12-21 NOTE — PROGRESS NOTES
Kai Fitzgerald IS A 59year old male HERE FOR Patient presents with:  HTN: Med F/U        History of present illness:     With psych on mirtazapine had increased weight, now decreasing weight 10# down now, off since 1.5 to 2 months ago.  is on fluoxetine needed for Anxiety. Disp: 30 tablet Rfl: 1   Clopidogrel Bisulfate 75 MG Oral Tab Take 1 tablet (75 mg total) by mouth daily. Disp: 30 tablet Rfl: 3   Multiple Vitamin (TAB-A-ZULAY) Oral Tab Take 1 tablet by mouth daily.  Disp:  Rfl:    Glucosa-Chondr-Na Cho Substance and Sexual Activity      Alcohol use: Yes        Alcohol/week: 0.0 oz        Comment:  Occ      Drug use: No      Sexual activity: Not on file    Other Topics      Concerns:         Service: Yes        Blood Transfusions: No        Caffein Total 12/18/2018 8.9    • Calculated Osmolality 12/18/2018 295    • GFR, Non- 12/18/2018 79    • GFR, -American 12/18/2018 91    • AST 12/18/2018 17    • Alt 12/18/2018 32    • Alkaline Phosphatase 12/18/2018 54    • Bilirubin, Total

## 2019-01-01 ENCOUNTER — HOSPITAL ENCOUNTER (OUTPATIENT)
Age: 59
Discharge: HOME | End: 2019-01-01
Payer: COMMERCIAL

## 2019-01-01 VITALS
OXYGEN SATURATION: 96 % | SYSTOLIC BLOOD PRESSURE: 155 MMHG | HEART RATE: 58 BPM | DIASTOLIC BLOOD PRESSURE: 67 MMHG | RESPIRATION RATE: 20 BRPM | TEMPERATURE: 98 F

## 2019-01-01 DIAGNOSIS — J20.9 ACUTE BRONCHITIS, UNSPECIFIED ORGANISM: Primary | ICD-10-CM

## 2019-01-01 PROCEDURE — 94640 AIRWAY INHALATION TREATMENT: CPT

## 2019-01-01 PROCEDURE — 99214 OFFICE O/P EST MOD 30 MIN: CPT

## 2019-01-01 RX ORDER — IPRATROPIUM BROMIDE AND ALBUTEROL SULFATE 2.5; .5 MG/3ML; MG/3ML
3 SOLUTION RESPIRATORY (INHALATION) ONCE
Status: COMPLETED | OUTPATIENT
Start: 2019-01-01 | End: 2019-01-01

## 2019-01-01 RX ORDER — AZITHROMYCIN 250 MG/1
TABLET, FILM COATED ORAL
Qty: 1 PACKAGE | Refills: 0 | Status: SHIPPED | OUTPATIENT
Start: 2019-01-01 | End: 2019-01-06

## 2019-01-01 RX ORDER — ALBUTEROL SULFATE 90 UG/1
2 AEROSOL, METERED RESPIRATORY (INHALATION) EVERY 4 HOURS PRN
Qty: 1 INHALER | Refills: 0 | Status: SHIPPED | OUTPATIENT
Start: 2019-01-01 | End: 2019-01-31

## 2019-01-01 RX ORDER — PREDNISONE 20 MG/1
40 TABLET ORAL DAILY
Qty: 10 TABLET | Refills: 0 | Status: SHIPPED | OUTPATIENT
Start: 2019-01-01 | End: 2019-01-06

## 2019-01-01 NOTE — ED PROVIDER NOTES
Patient Seen in: THE [facility] In [facility] END    History   Patient presents with:  Cough    Stated Complaint: cough    PHIL Serrano is a 52-year-old male who comes in today complaining of bronchitis-like symptoms.   He states he has had some cold HPI.  Constitutional and vital signs reviewed. All other systems reviewed and negative except as noted above.     Physical Exam     ED Triage Vitals   BP 01/01/19 1237 155/67   Pulse 01/01/19 1237 58   Resp 01/01/19 1237 20   Temp 01/01/19 1237 98.3 °F bronchitis, unspecified organism  (primary encounter diagnosis)    Disposition:  Discharge  1/1/2019  1:21 pm    Follow-up:  Frankey Shackleton, MD  [address] [facility] [address] [facility] [address]  [phone]    Schedule an appointment as soon as maddie

## 2019-01-16 RX ORDER — OMEGA-3-ACID ETHYL ESTERS 1 G/1
CAPSULE, LIQUID FILLED ORAL
Qty: 360 CAPSULE | Refills: 1 | Status: SHIPPED | OUTPATIENT
Start: 2019-01-16 | End: 2019-09-05

## 2019-01-16 NOTE — TELEPHONE ENCOUNTER
Name from pharmacy: [facility] (Ollie Escalera)         Will file in chart as: OMEGA-3-ACID ETHYL ESTERS 1 g Oral Cap    The source prescription was discontinued on 10/22/2018 by Kelton Del Angel [address] [place], [place] [address] for the following reason: Duplicate therapy.     Sig: T

## 2019-02-01 DIAGNOSIS — E78.2 MIXED HYPERLIPIDEMIA: ICD-10-CM

## 2019-02-01 DIAGNOSIS — I10 ESSENTIAL HYPERTENSION, BENIGN: ICD-10-CM

## 2019-02-01 DIAGNOSIS — R73.01 IFG (IMPAIRED FASTING GLUCOSE): Primary | ICD-10-CM

## 2019-02-02 RX ORDER — LISINOPRIL 20 MG/1
TABLET ORAL
Qty: 60 TABLET | Refills: 0 | Status: SHIPPED | OUTPATIENT
Start: 2019-02-02 | End: 2020-01-31 | Stop reason: SINTOL

## 2019-02-02 NOTE — TELEPHONE ENCOUNTER
LOV 12/21/2018    LAST LAB Patient needs future labs.  Last labs done on 12/18/2018     LAST RX 10/22/2018    Next OV   Future Appointments   Date Time Provider Steffi Jones   6/21/2019  8:30 AM Lindsay Whitehead MD [facility]         PROTOCOL

## 2019-02-22 ENCOUNTER — PRIOR ORIGINAL RECORDS (OUTPATIENT)
Dept: OTHER | Age: 59
End: 2019-02-22

## 2019-02-28 VITALS
HEIGHT: 72 IN | WEIGHT: 213 LBS | BODY MASS INDEX: 28.85 KG/M2 | SYSTOLIC BLOOD PRESSURE: 138 MMHG | DIASTOLIC BLOOD PRESSURE: 80 MMHG | HEART RATE: 56 BPM

## 2019-03-01 ENCOUNTER — HOSPITAL ENCOUNTER (OUTPATIENT)
Dept: CARDIOLOGY CLINIC | Facility: HOSPITAL | Age: 59
Discharge: HOME | End: 2019-03-01
Attending: INTERNAL MEDICINE

## 2019-03-01 ENCOUNTER — PRIOR ORIGINAL RECORDS (OUTPATIENT)
Dept: OTHER | Age: 59
End: 2019-03-01

## 2019-03-01 VITALS
BODY MASS INDEX: 33.32 KG/M2 | SYSTOLIC BLOOD PRESSURE: 119 MMHG | WEIGHT: 246 LBS | HEART RATE: 60 BPM | DIASTOLIC BLOOD PRESSURE: 78 MMHG | HEIGHT: 72 IN

## 2019-03-01 DIAGNOSIS — I65.23 BILATERAL CAROTID ARTERY STENOSIS: ICD-10-CM

## 2019-03-07 ENCOUNTER — TELEPHONE (OUTPATIENT)
Dept: CARDIOLOGY | Age: 59
End: 2019-03-07

## 2019-03-07 VITALS
WEIGHT: 233 LBS | DIASTOLIC BLOOD PRESSURE: 88 MMHG | HEIGHT: 72 IN | HEART RATE: 50 BPM | SYSTOLIC BLOOD PRESSURE: 148 MMHG | BODY MASS INDEX: 31.56 KG/M2

## 2019-03-07 DIAGNOSIS — I65.23 BILATERAL CAROTID ARTERY STENOSIS: Primary | ICD-10-CM

## 2019-03-16 ENCOUNTER — HOSPITAL ENCOUNTER (OUTPATIENT)
Dept: CV DIAGNOSTICS | Facility: HOSPITAL | Age: 59
Discharge: HOME | End: 2019-03-16
Attending: INTERNAL MEDICINE
Payer: COMMERCIAL

## 2019-03-16 DIAGNOSIS — I25.10 CAD (CORONARY ARTERY DISEASE), NATIVE CORONARY ARTERY: ICD-10-CM

## 2019-03-16 PROCEDURE — 78452 HT MUSCLE IMAGE SPECT MULT: CPT | Performed by: INTERNAL MEDICINE

## 2019-03-16 PROCEDURE — 93018 CV STRESS TEST I&R ONLY: CPT | Performed by: INTERNAL MEDICINE

## 2019-03-16 PROCEDURE — 93017 CV STRESS TEST TRACING ONLY: CPT | Performed by: INTERNAL MEDICINE

## 2019-03-21 ENCOUNTER — TELEPHONE (OUTPATIENT)
Dept: CARDIOLOGY | Age: 59
End: 2019-03-21

## 2019-03-21 ENCOUNTER — PATIENT MESSAGE (OUTPATIENT)
Dept: FAMILY MEDICINE CLINIC | Facility: CLINIC | Age: 59
End: 2019-03-21

## 2019-03-22 NOTE — TELEPHONE ENCOUNTER
From: Ashley Fan  Sent: 3/21/2019 7:26 PM CDT  To: [facility] Clinical Staff  Subject: RE:refill request for clonazepam    ----- Message from [employer] sent at 3/21/2019 7:26 PM CDT -----    Hi Dr Myriam Yoon,  I am all set. Rebeca made a mistake.  No

## 2019-04-01 ENCOUNTER — MED REC SCAN ONLY (OUTPATIENT)
Dept: FAMILY MEDICINE CLINIC | Facility: CLINIC | Age: 59
End: 2019-04-01

## 2019-04-03 RX ORDER — CLOPIDOGREL BISULFATE 75 MG/1
TABLET ORAL
Qty: 90 TABLET | Refills: 3 | Status: SHIPPED | OUTPATIENT
Start: 2019-04-03 | End: 2020-03-05 | Stop reason: ALTCHOICE

## 2019-04-04 RX ORDER — ATORVASTATIN CALCIUM 20 MG/1
TABLET, FILM COATED ORAL
Qty: 30 TABLET | Refills: 0 | Status: SHIPPED | OUTPATIENT
Start: 2019-04-04 | End: 2019-05-20 | Stop reason: SDUPTHER

## 2019-04-04 RX ORDER — ATORVASTATIN CALCIUM 20 MG/1
TABLET, FILM COATED ORAL
Qty: 30 TABLET | Refills: 0 | OUTPATIENT
Start: 2019-04-04

## 2019-04-04 RX ORDER — CLOPIDOGREL BISULFATE 75 MG/1
TABLET ORAL
Qty: 30 TABLET | Refills: 5 | Status: SHIPPED | OUTPATIENT
Start: 2019-04-04 | End: 2020-04-14 | Stop reason: SDUPTHER

## 2019-04-23 RX ORDER — RANITIDINE 150 MG/1
TABLET ORAL
COMMUNITY
End: 2020-03-06 | Stop reason: ALTCHOICE

## 2019-04-23 RX ORDER — FLUOXETINE 20 MG/1
TABLET, FILM COATED ORAL
COMMUNITY
End: 2021-03-01 | Stop reason: ALTCHOICE

## 2019-04-23 RX ORDER — OMEGA-3 FATTY ACIDS/FISH OIL 300-1000MG
CAPSULE ORAL
COMMUNITY
End: 2020-03-05 | Stop reason: ALTCHOICE

## 2019-04-23 RX ORDER — LISINOPRIL 40 MG/1
TABLET ORAL
COMMUNITY
End: 2020-03-06 | Stop reason: ALTCHOICE

## 2019-04-23 RX ORDER — ATENOLOL 25 MG/1
TABLET ORAL
COMMUNITY
End: 2020-05-11 | Stop reason: SDUPTHER

## 2019-04-23 RX ORDER — CLONAZEPAM 0.5 MG/1
TABLET ORAL
COMMUNITY

## 2019-05-14 RX ORDER — OMEGA-3-ACID ETHYL ESTERS 1 G/1
CAPSULE, LIQUID FILLED ORAL
Qty: 360 CAPSULE | Refills: 0 | OUTPATIENT
Start: 2019-05-14

## 2019-05-14 NOTE — TELEPHONE ENCOUNTER
LOV 12/18    LAST LAB 12/18                                                                                      LAST RX   OMEGA-3-ACID ETHYL ESTERS 1 g Oral Cap 360 capsule 1 1/16/2019    Sig:   TAKE 2 CAPSULES BY MOUTH TWICE DAILY           Next OV 6/7/2

## 2019-05-20 RX ORDER — ATORVASTATIN CALCIUM 20 MG/1
TABLET, FILM COATED ORAL
Qty: 30 TABLET | Refills: 0 | Status: SHIPPED | OUTPATIENT
Start: 2019-05-20 | End: 2020-03-05 | Stop reason: ALTCHOICE

## 2019-06-03 ENCOUNTER — LAB ENCOUNTER (OUTPATIENT)
Dept: LAB | Facility: HOSPITAL | Age: 59
End: 2019-06-03
Attending: FAMILY MEDICINE
Payer: COMMERCIAL

## 2019-06-03 DIAGNOSIS — E11.9 CONTROLLED TYPE 2 DIABETES MELLITUS WITHOUT COMPLICATION, WITHOUT LONG-TERM CURRENT USE OF INSULIN (HCC): ICD-10-CM

## 2019-06-03 DIAGNOSIS — I10 ESSENTIAL HYPERTENSION, BENIGN: ICD-10-CM

## 2019-06-03 DIAGNOSIS — E78.2 MIXED HYPERLIPIDEMIA: ICD-10-CM

## 2019-06-03 DIAGNOSIS — R73.01 IFG (IMPAIRED FASTING GLUCOSE): ICD-10-CM

## 2019-06-03 DIAGNOSIS — I10 ESSENTIAL HYPERTENSION: ICD-10-CM

## 2019-06-03 PROCEDURE — 83721 ASSAY OF BLOOD LIPOPROTEIN: CPT

## 2019-06-03 PROCEDURE — 82570 ASSAY OF URINE CREATININE: CPT

## 2019-06-03 PROCEDURE — 82043 UR ALBUMIN QUANTITATIVE: CPT

## 2019-06-03 PROCEDURE — 80053 COMPREHEN METABOLIC PANEL: CPT

## 2019-06-03 PROCEDURE — 83036 HEMOGLOBIN GLYCOSYLATED A1C: CPT

## 2019-06-03 PROCEDURE — 80061 LIPID PANEL: CPT

## 2019-06-03 PROCEDURE — 36415 COLL VENOUS BLD VENIPUNCTURE: CPT

## 2019-06-04 DIAGNOSIS — K21.9 CHRONIC GERD: ICD-10-CM

## 2019-06-05 RX ORDER — RANITIDINE 150 MG/1
CAPSULE ORAL
Qty: 90 CAPSULE | Refills: 0 | Status: SHIPPED | OUTPATIENT
Start: 2019-06-05 | End: 2019-08-19

## 2019-06-05 NOTE — TELEPHONE ENCOUNTER
LOV    LAST LAB 6/3/19    LAST RX 10/22/18 x 6 months    Next OV   Future Appointments   Date Time Provider Steffi Karen   6/7/2019  2:00 PM Lindsay Whitehead MD [facility]   6/21/2019  8:30 AM Lindsay Whitehead MD [facility]

## 2019-06-07 ENCOUNTER — OFFICE VISIT (OUTPATIENT)
Dept: FAMILY MEDICINE CLINIC | Facility: CLINIC | Age: 59
End: 2019-06-07
Payer: COMMERCIAL

## 2019-06-07 VITALS
WEIGHT: 239.81 LBS | OXYGEN SATURATION: 97 % | TEMPERATURE: 97 F | HEIGHT: 70.25 IN | SYSTOLIC BLOOD PRESSURE: 132 MMHG | BODY MASS INDEX: 34.33 KG/M2 | HEART RATE: 72 BPM | DIASTOLIC BLOOD PRESSURE: 76 MMHG | RESPIRATION RATE: 18 BRPM

## 2019-06-07 DIAGNOSIS — N40.1 BENIGN PROSTATIC HYPERPLASIA WITH URINARY FREQUENCY: ICD-10-CM

## 2019-06-07 DIAGNOSIS — R35.0 BENIGN PROSTATIC HYPERPLASIA WITH URINARY FREQUENCY: ICD-10-CM

## 2019-06-07 DIAGNOSIS — H91.90 HEARING LOSS, UNSPECIFIED HEARING LOSS TYPE, UNSPECIFIED LATERALITY: Primary | ICD-10-CM

## 2019-06-07 DIAGNOSIS — Z12.5 ENCOUNTER FOR PROSTATE CANCER SCREENING: ICD-10-CM

## 2019-06-07 DIAGNOSIS — R73.01 IFG (IMPAIRED FASTING GLUCOSE): ICD-10-CM

## 2019-06-07 DIAGNOSIS — E78.2 MIXED HYPERLIPIDEMIA: ICD-10-CM

## 2019-06-07 PROCEDURE — 99396 PREV VISIT EST AGE 40-64: CPT | Performed by: FAMILY MEDICINE

## 2019-06-07 PROCEDURE — 99213 OFFICE O/P EST LOW 20 MIN: CPT | Performed by: FAMILY MEDICINE

## 2019-06-07 RX ORDER — ATORVASTATIN CALCIUM 40 MG/1
40 TABLET, FILM COATED ORAL NIGHTLY
Qty: 90 TABLET | Refills: 1 | Status: SHIPPED | OUTPATIENT
Start: 2019-06-07 | End: 2020-01-28

## 2019-06-07 RX ORDER — CLONAZEPAM 1 MG/1
TABLET ORAL
Refills: 0 | COMMUNITY
Start: 2019-01-11 | End: 2020-08-07 | Stop reason: DRUGHIGH

## 2019-06-07 RX ORDER — MIRTAZAPINE 15 MG/1
TABLET, FILM COATED ORAL
Refills: 2 | COMMUNITY
Start: 2019-03-16 | End: 2019-06-07

## 2019-06-07 NOTE — PATIENT INSTRUCTIONS
Screening: Vaccines: Recommend shingles vaccine Shingrix, 2 doses 2-6 months apart  if over age 48. Lifestyle: We encourage low fat diet with whole grains, fruits, vegetables, lean meats, fish and low-fat dairy.  We also recommend 150 minutes of exercise p reading is higher than normal, follow the advice of your healthcare provider      Colorectal cancer All men in this age group Flexible sigmoidoscopy every 5 years, or colonoscopy every 10 years, or double-contrast barium enema every 5 years; yearly fecal o vaccine 2 doses; second dose should be given at least 4 weeks after the first dose   Hepatitis A Men at increased risk for infection – talk with your healthcare provider 2 doses given at least 6 months apart   Hepatitis B Men at increased risk for infectio can cause All men in this age group Every visit   [address] [place] [address] [facility]  Date Last Reviewed: 2/1/2017  © 6292-0605 [employer]. [address] [facility], [address] [place] [address]. All rights reserved.  This information is not intended

## 2019-06-07 NOTE — PROGRESS NOTES
Aj Najera is a 59year old male here for Patient presents with:  Physical  .    HPI:    Patient complains of here for well exam.  Current concerns:    Plans to visit son in [address]. Feels great, has gained some weight.  Still rowing 4-5 d/wee FLUoxetine HCl 20 MG Oral Cap Take 1 capsule (20 mg total) by mouth daily. Disp: 30 capsule Rfl: 1   Clopidogrel Bisulfate 75 MG Oral Tab Take 1 tablet (75 mg total) by mouth daily.  Disp: 30 tablet Rfl: 3   Multiple Vitamin (TAB-A-ZULAY) Oral Tab Take 1 t ago    Substance and Sexual Activity      Alcohol use: Yes        Alcohol/week: 0.0 oz        Comment:  Occ      Drug use: No      Sexual activity: Not on file    Lifestyle      Physical activity:        Days per week: Not on file        Minutes per session 18   Ht 70.25\"   Wt 239 lb 12.8 oz   SpO2 97%   BMI 34.16 kg/m²      General Appearance:    Alert, cooperative, no distress, appears stated age   Head:    Normocephalic, without obvious abnormality, atraumatic   Eyes:    PERRL, conjunctiva/corneas clear, 21.0 - 32.0 mmol/L Final   • Anion Gap 06/03/2019 5  0 - 18 mmol/L Final   • BUN 06/03/2019 27* 7 - 18 mg/dL Final   • Creatinine 06/03/2019 1.17  0.70 - 1.30 mg/dL Final   • BUN/CREA Ratio 06/03/2019 23.1* 10.0 - 20.0 Final   • Calcium, Total 06/03/2019 9 <5.7 % Final       Normal HbA1C:     <5.7%      Pre-Diabetic:     5.7 - 6.4%      Diabetic:         >6.4%      Diabetic Control: <7.0%       • Estimated Average Glucose 06/03/2019 131* 68 - 126 mg/dL Final      eAG is the estimated average glucose calculat with your habits, help is available. Advance directives: information available at http://www. [place]ph.state. [place]/public/books/advin.htm.  If you complete a living will/health care power of , please bring us a copy.'    Health prevention and screening: to rise)   Type 2 diabetes All men with prediabetes Every year   Hepatitis C Men at increased risk for infection – talk with your healthcare provider At routine exams. All men ages 48 to 79 should be tested at least once for hepatitis C.    High cholesterol 46s who have no record of these infections or vaccines 1 or 2 doses; ask your healthcare provider   Meningococcal Men at increased risk for infection – talk with your healthcare provider 1 or more doses   Pneumococcal conjugate vaccine (PCV13) and pneumoco

## 2019-06-11 ENCOUNTER — MED REC SCAN ONLY (OUTPATIENT)
Dept: FAMILY MEDICINE CLINIC | Facility: CLINIC | Age: 59
End: 2019-06-11

## 2019-07-12 NOTE — ED INITIAL ASSESSMENT (HPI)
Cough-  Cough with phlegm  Started yesterday. Started with head cold. States he gets bronchitis every year. c/o  Chest congestion. No otc meds taken for codeine. Denies fever.  Pt will be flying to [facility] Normal vision: sees adequately in most situations; can see medication labels, newsprint

## 2019-08-19 DIAGNOSIS — K21.9 CHRONIC GERD: ICD-10-CM

## 2019-08-20 RX ORDER — RANITIDINE 150 MG/1
CAPSULE ORAL
Qty: 90 CAPSULE | Refills: 1 | Status: SHIPPED | OUTPATIENT
Start: 2019-08-20 | End: 2020-08-07 | Stop reason: ALTCHOICE

## 2019-08-20 NOTE — TELEPHONE ENCOUNTER
LOV 6-7-19    LAST LAB  6-3-19    LAST RX 6-5-19 x 90    Next OV   Future Appointments   Date Time Provider Steffi Karen   12/6/2019  8:40 AM Turner Kirby MD [facility]         PROTOCOL  Gastrointestinal Medication Protocol Passed8/19 6:

## 2019-09-05 RX ORDER — OMEGA-3-ACID ETHYL ESTERS 1 G/1
CAPSULE, LIQUID FILLED ORAL
Qty: 360 CAPSULE | Refills: 0 | Status: SHIPPED | OUTPATIENT
Start: 2019-09-05 | End: 2019-12-15

## 2019-09-05 NOTE — TELEPHONE ENCOUNTER
LOV 6/19    LAST LAB 6/19  Due in Dec.     LAST RX   atorvastatin 40 MG Oral Tab 90 tablet 1 6/7/2019         Next OV 12/6/2019      PROTOCOL  Cholesterol Medication Protocol Passed    Refilled x 3 months, Needs lab for refill.

## 2019-10-02 ENCOUNTER — PATIENT MESSAGE (OUTPATIENT)
Dept: FAMILY MEDICINE CLINIC | Facility: CLINIC | Age: 59
End: 2019-10-02

## 2019-10-02 NOTE — TELEPHONE ENCOUNTER
From: Aletha Strauss  To: Dorothea Arango MD  Sent: 10/2/2019 10:12 AM CDT  Subject: Other    Hi Doctor Pector,    Please note on my records that I had my second shingles vaccine on 9/30/2019. Thank you!!     Samira Islas

## 2019-12-02 ENCOUNTER — PATIENT MESSAGE (OUTPATIENT)
Dept: FAMILY MEDICINE CLINIC | Facility: CLINIC | Age: 59
End: 2019-12-02

## 2019-12-06 ENCOUNTER — TELEPHONE (OUTPATIENT)
Dept: FAMILY MEDICINE CLINIC | Facility: CLINIC | Age: 59
End: 2019-12-06

## 2019-12-17 RX ORDER — OMEGA-3-ACID ETHYL ESTERS 1 G/1
CAPSULE, LIQUID FILLED ORAL
Qty: 360 CAPSULE | Refills: 0 | Status: SHIPPED | OUTPATIENT
Start: 2019-12-17 | End: 2020-03-23

## 2019-12-17 NOTE — TELEPHONE ENCOUNTER
LOV 6/7/2019     LAST LAB 6/3/2019     LAST RX   OMEGA-3-ACID ETHYL ESTERS 1 g Oral Cap 360 capsule 0 9/5/2019    Sig:   TAKE 2 CAPSULES BY MOUTH TWICE DAILY           Next OV   Future Appointments   Date Time Provider Steffi Jones   1/6/2020  9:00 AM

## 2020-01-27 DIAGNOSIS — E78.2 MIXED HYPERLIPIDEMIA: ICD-10-CM

## 2020-01-28 ENCOUNTER — TELEPHONE (OUTPATIENT)
Dept: FAMILY MEDICINE CLINIC | Facility: CLINIC | Age: 60
End: 2020-01-28

## 2020-01-28 RX ORDER — ATORVASTATIN CALCIUM 40 MG/1
TABLET, FILM COATED ORAL
Qty: 30 TABLET | Refills: 0 | Status: SHIPPED | OUTPATIENT
Start: 2020-01-28 | End: 2020-01-31

## 2020-01-28 NOTE — TELEPHONE ENCOUNTER
Approvedtoday  CaseId:[id number];Status:Approved; Review Type:Prior Auth; Coverage Start Date:12/29/2019; Coverage End Date:01/27/2023;

## 2020-01-29 ENCOUNTER — LAB ENCOUNTER (OUTPATIENT)
Dept: LAB | Facility: HOSPITAL | Age: 60
End: 2020-01-29
Attending: FAMILY MEDICINE
Payer: COMMERCIAL

## 2020-01-29 DIAGNOSIS — Z12.5 ENCOUNTER FOR PROSTATE CANCER SCREENING: ICD-10-CM

## 2020-01-29 DIAGNOSIS — E78.2 MIXED HYPERLIPIDEMIA: ICD-10-CM

## 2020-01-29 DIAGNOSIS — R73.01 IFG (IMPAIRED FASTING GLUCOSE): ICD-10-CM

## 2020-01-29 DIAGNOSIS — N40.1 BENIGN PROSTATIC HYPERPLASIA WITH URINARY FREQUENCY: ICD-10-CM

## 2020-01-29 DIAGNOSIS — R35.0 BENIGN PROSTATIC HYPERPLASIA WITH URINARY FREQUENCY: ICD-10-CM

## 2020-01-29 LAB
ALBUMIN SERPL-MCNC: 3.8 G/DL (ref 3.4–5)
ALBUMIN/GLOB SERPL: 1.2 {RATIO} (ref 1–2)
ALP LIVER SERPL-CCNC: 69 U/L (ref 45–117)
ALT SERPL-CCNC: 51 U/L (ref 16–61)
ANION GAP SERPL CALC-SCNC: 7 MMOL/L (ref 0–18)
AST SERPL-CCNC: 26 U/L (ref 15–37)
BILIRUB SERPL-MCNC: 0.4 MG/DL (ref 0.1–2)
BUN BLD-MCNC: 18 MG/DL (ref 7–18)
BUN/CREAT SERPL: 15.9 (ref 10–20)
CALCIUM BLD-MCNC: 9.3 MG/DL (ref 8.5–10.1)
CHLORIDE SERPL-SCNC: 104 MMOL/L (ref 98–112)
CHOLEST SMN-MCNC: 186 MG/DL (ref ?–200)
CO2 SERPL-SCNC: 27 MMOL/L (ref 21–32)
COMPLEXED PSA SERPL-MCNC: 0.32 NG/ML (ref ?–4)
CREAT BLD-MCNC: 1.13 MG/DL (ref 0.7–1.3)
EST. AVERAGE GLUCOSE BLD GHB EST-MCNC: 146 MG/DL (ref 68–126)
GLOBULIN PLAS-MCNC: 3.2 G/DL (ref 2.8–4.4)
GLUCOSE BLD-MCNC: 138 MG/DL (ref 70–99)
HBA1C MFR BLD HPLC: 6.7 % (ref ?–5.7)
HDLC SERPL-MCNC: 36 MG/DL (ref 40–59)
LDLC SERPL DIRECT ASSAY-MCNC: 81 MG/DL (ref ?–100)
M PROTEIN MFR SERPL ELPH: 7 G/DL (ref 6.4–8.2)
NONHDLC SERPL-MCNC: 150 MG/DL (ref ?–130)
OSMOLALITY SERPL CALC.SUM OF ELEC: 290 MOSM/KG (ref 275–295)
PATIENT FASTING Y/N/NP: YES
PATIENT FASTING Y/N/NP: YES
POTASSIUM SERPL-SCNC: 4.5 MMOL/L (ref 3.5–5.1)
SODIUM SERPL-SCNC: 138 MMOL/L (ref 136–145)
TRIGL SERPL-MCNC: 596 MG/DL (ref 30–149)

## 2020-01-29 PROCEDURE — 83721 ASSAY OF BLOOD LIPOPROTEIN: CPT

## 2020-01-29 PROCEDURE — 83036 HEMOGLOBIN GLYCOSYLATED A1C: CPT

## 2020-01-29 PROCEDURE — 36415 COLL VENOUS BLD VENIPUNCTURE: CPT

## 2020-01-29 PROCEDURE — 80061 LIPID PANEL: CPT

## 2020-01-29 PROCEDURE — 80053 COMPREHEN METABOLIC PANEL: CPT

## 2020-01-31 ENCOUNTER — OFFICE VISIT (OUTPATIENT)
Dept: FAMILY MEDICINE CLINIC | Facility: CLINIC | Age: 60
End: 2020-01-31
Payer: COMMERCIAL

## 2020-01-31 VITALS
OXYGEN SATURATION: 97 % | DIASTOLIC BLOOD PRESSURE: 68 MMHG | HEART RATE: 60 BPM | TEMPERATURE: 97 F | HEIGHT: 70.25 IN | RESPIRATION RATE: 16 BRPM | SYSTOLIC BLOOD PRESSURE: 122 MMHG | BODY MASS INDEX: 36.68 KG/M2 | WEIGHT: 256.19 LBS

## 2020-01-31 DIAGNOSIS — E11.9 CONTROLLED TYPE 2 DIABETES MELLITUS WITHOUT COMPLICATION, WITHOUT LONG-TERM CURRENT USE OF INSULIN (HCC): ICD-10-CM

## 2020-01-31 DIAGNOSIS — Z71.84 TRAVEL ADVICE ENCOUNTER: ICD-10-CM

## 2020-01-31 DIAGNOSIS — I10 ESSENTIAL HYPERTENSION, BENIGN: ICD-10-CM

## 2020-01-31 DIAGNOSIS — L84 FOOT CALLUS: Primary | ICD-10-CM

## 2020-01-31 DIAGNOSIS — E78.2 MIXED HYPERLIPIDEMIA: ICD-10-CM

## 2020-01-31 PROCEDURE — 99214 OFFICE O/P EST MOD 30 MIN: CPT | Performed by: FAMILY MEDICINE

## 2020-01-31 RX ORDER — LISINOPRIL 20 MG/1
TABLET ORAL
Qty: 60 TABLET | Refills: 2 | Status: CANCELLED | OUTPATIENT
Start: 2020-01-31

## 2020-01-31 RX ORDER — ATORVASTATIN CALCIUM 40 MG/1
TABLET, FILM COATED ORAL
Qty: 90 TABLET | Refills: 1 | Status: SHIPPED | OUTPATIENT
Start: 2020-01-31 | End: 2020-08-07

## 2020-01-31 RX ORDER — LISINOPRIL 40 MG/1
TABLET ORAL
COMMUNITY
Start: 2019-12-15 | End: 2020-01-31

## 2020-01-31 RX ORDER — LOSARTAN POTASSIUM 100 MG/1
100 TABLET ORAL DAILY
Qty: 30 TABLET | Refills: 2 | Status: SHIPPED | OUTPATIENT
Start: 2020-01-31 | End: 2020-05-07

## 2020-01-31 RX ORDER — METFORMIN HYDROCHLORIDE 500 MG/1
500 TABLET, EXTENDED RELEASE ORAL DAILY
Qty: 30 TABLET | Refills: 2 | Status: SHIPPED | OUTPATIENT
Start: 2020-01-31 | End: 2020-05-07
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
## 2020-01-31 NOTE — PATIENT INSTRUCTIONS
Consider therapy for knee if pain persists or if any recurrent dislocation of kneecap. Stop lisinopril and start losartan 100 mg daily. Start metformin 500 mg daily, plan for 6-12 months to get A1c better.      Podiatrist Dr. Kristi Koehler to discuss possi

## 2020-02-10 ENCOUNTER — TELEPHONE (OUTPATIENT)
Dept: CARDIOLOGY | Age: 60
End: 2020-02-10

## 2020-02-10 PROBLEM — I25.10 CORONARY ARTERY DISEASE INVOLVING NATIVE CORONARY ARTERY OF NATIVE HEART WITHOUT ANGINA PECTORIS: Status: ACTIVE | Noted: 2020-02-10

## 2020-03-05 RX ORDER — METFORMIN HYDROCHLORIDE 500 MG/1
TABLET, EXTENDED RELEASE ORAL
Refills: 2 | COMMUNITY
Start: 2020-01-31

## 2020-03-05 RX ORDER — OMEGA-3-ACID ETHYL ESTERS 1 G/1
CAPSULE, LIQUID FILLED ORAL
Refills: 0 | COMMUNITY
Start: 2019-12-17

## 2020-03-05 RX ORDER — LOSARTAN POTASSIUM 100 MG/1
TABLET ORAL
Refills: 2 | COMMUNITY
Start: 2020-01-31

## 2020-03-05 RX ORDER — MULTIVITAMIN WITH FOLIC ACID 400 MCG
1 TABLET ORAL
COMMUNITY

## 2020-03-05 RX ORDER — ATORVASTATIN CALCIUM 40 MG/1
TABLET, FILM COATED ORAL
Refills: 0 | COMMUNITY
Start: 2020-01-28

## 2020-03-06 ENCOUNTER — OFFICE VISIT (OUTPATIENT)
Dept: CARDIOLOGY | Age: 60
End: 2020-03-06

## 2020-03-06 VITALS
HEIGHT: 72 IN | DIASTOLIC BLOOD PRESSURE: 68 MMHG | WEIGHT: 256 LBS | SYSTOLIC BLOOD PRESSURE: 132 MMHG | HEART RATE: 74 BPM | BODY MASS INDEX: 34.67 KG/M2

## 2020-03-06 DIAGNOSIS — I10 ESSENTIAL (PRIMARY) HYPERTENSION: ICD-10-CM

## 2020-03-06 DIAGNOSIS — I25.10 CORONARY ARTERY DISEASE INVOLVING NATIVE CORONARY ARTERY OF NATIVE HEART WITHOUT ANGINA PECTORIS: ICD-10-CM

## 2020-03-06 DIAGNOSIS — E78.2 MIXED HYPERLIPIDEMIA: Primary | ICD-10-CM

## 2020-03-06 DIAGNOSIS — I65.23 ASYMPTOMATIC CAROTID ARTERY STENOSIS, BILATERAL: ICD-10-CM

## 2020-03-06 PROCEDURE — 3078F DIAST BP <80 MM HG: CPT | Performed by: NURSE PRACTITIONER

## 2020-03-06 PROCEDURE — 99214 OFFICE O/P EST MOD 30 MIN: CPT | Performed by: NURSE PRACTITIONER

## 2020-03-06 PROCEDURE — 3075F SYST BP GE 130 - 139MM HG: CPT | Performed by: NURSE PRACTITIONER

## 2020-03-06 SDOH — SOCIAL STABILITY: SOCIAL NETWORK: ARE YOU MARRIED, WIDOWED, DIVORCED, SEPARATED, NEVER MARRIED, OR LIVING WITH A PARTNER?: MARRIED

## 2020-03-06 SDOH — HEALTH STABILITY: PHYSICAL HEALTH: ON AVERAGE, HOW MANY MINUTES DO YOU ENGAGE IN EXERCISE AT THIS LEVEL?: 50 MIN

## 2020-03-06 SDOH — HEALTH STABILITY: PHYSICAL HEALTH: ON AVERAGE, HOW MANY DAYS PER WEEK DO YOU ENGAGE IN MODERATE TO STRENUOUS EXERCISE (LIKE A BRISK WALK)?: 3 DAYS

## 2020-03-06 ASSESSMENT — ENCOUNTER SYMPTOMS
BRUISES/BLEEDS EASILY: 0
COUGH: 0
ALLERGIC/IMMUNOLOGIC COMMENTS: NO NEW FOOD ALLERGIES
FEVER: 0
HEMOPTYSIS: 0
WEIGHT GAIN: 0
HEMATOCHEZIA: 0
SUSPICIOUS LESIONS: 0
CHILLS: 0
WEIGHT LOSS: 0

## 2020-03-06 ASSESSMENT — PATIENT HEALTH QUESTIONNAIRE - PHQ9
2. FEELING DOWN, DEPRESSED OR HOPELESS: NOT AT ALL
SUM OF ALL RESPONSES TO PHQ9 QUESTIONS 1 AND 2: 0
SUM OF ALL RESPONSES TO PHQ9 QUESTIONS 1 AND 2: 0
1. LITTLE INTEREST OR PLEASURE IN DOING THINGS: NOT AT ALL

## 2020-03-23 RX ORDER — OMEGA-3-ACID ETHYL ESTERS 1 G/1
CAPSULE, LIQUID FILLED ORAL
Qty: 360 CAPSULE | Refills: 0 | Status: SHIPPED | OUTPATIENT
Start: 2020-03-23 | End: 2020-07-08

## 2020-03-23 NOTE — TELEPHONE ENCOUNTER
LOV 1/31/2020    LAST LAB 1/29/20    LAST RX   OMEGA-3-ACID ETHYL ESTERS 1 g Oral Cap 360 capsule 0 12/17/2019     Sig: TAKE 2 CAPSULES BY MOUTH TWICE DAILY          Next OV   Future Appointments   Date Time Provider Steffi Jones   5/1/2020  9:00 AM P

## 2020-04-14 RX ORDER — CLOPIDOGREL BISULFATE 75 MG/1
75 TABLET ORAL DAILY
Qty: 90 TABLET | Refills: 3 | Status: SHIPPED | OUTPATIENT
Start: 2020-04-14

## 2020-04-27 ENCOUNTER — TELEPHONE (OUTPATIENT)
Dept: FAMILY MEDICINE CLINIC | Facility: CLINIC | Age: 60
End: 2020-04-27

## 2020-05-06 NOTE — TELEPHONE ENCOUNTER
LOV 1/31/2020    LAST LAB 1-29-20     LAST RX 1-31-20 30*2 for both meds    Next OV   Future Appointments   Date Time Provider Steffi Jones   5/22/2020  9:00 AM Kelton Johnson MD [facility]         PROTOCOL    Name from pharmacy: [facility]

## 2020-05-07 RX ORDER — LOSARTAN POTASSIUM 100 MG/1
100 TABLET ORAL DAILY
Qty: 30 TABLET | Refills: 2 | Status: SHIPPED | OUTPATIENT
Start: 2020-05-07 | End: 2020-08-07

## 2020-05-07 RX ORDER — METFORMIN HYDROCHLORIDE 500 MG/1
500 TABLET, EXTENDED RELEASE ORAL DAILY
Qty: 30 TABLET | Refills: 2 | Status: SHIPPED | OUTPATIENT
Start: 2020-05-07 | End: 2020-08-07

## 2020-05-07 NOTE — TELEPHONE ENCOUNTER
LOV 1/31/2020    LAST LAB 1-29-20    LAST RX 1-31-20 30*2    Next OV   Future Appointments   Date Time Provider Steffi Karen   5/22/2020  9:00 AM Raman Cintron MD [facility]   7/10/2020  9:00 AM Raman Cintron MD [facility]

## 2020-05-11 RX ORDER — ATENOLOL 25 MG/1
25 TABLET ORAL DAILY
Qty: 90 TABLET | Refills: 3 | Status: SHIPPED | OUTPATIENT
Start: 2020-05-11 | End: 2021-04-29 | Stop reason: SDUPTHER

## 2020-05-26 RX ORDER — LOSARTAN POTASSIUM 100 MG/1
TABLET ORAL
Qty: 30 TABLET | Refills: 2 | OUTPATIENT
Start: 2020-05-26

## 2020-05-26 RX ORDER — METFORMIN HYDROCHLORIDE 500 MG/1
TABLET, EXTENDED RELEASE ORAL
Qty: 30 TABLET | Refills: 2 | OUTPATIENT
Start: 2020-05-26

## 2020-07-07 ENCOUNTER — TELEPHONE (OUTPATIENT)
Dept: FAMILY MEDICINE CLINIC | Facility: CLINIC | Age: 60
End: 2020-07-07

## 2020-07-08 RX ORDER — OMEGA-3-ACID ETHYL ESTERS 1 G/1
CAPSULE, LIQUID FILLED ORAL
Qty: 360 CAPSULE | Refills: 0 | Status: SHIPPED | OUTPATIENT
Start: 2020-07-08 | End: 2020-08-07

## 2020-07-08 NOTE — TELEPHONE ENCOUNTER
LOV 1/31/20    LAST LAB 1/29/20    LAST RX   OMEGA-3-ACID ETHYL ESTERS 1 g Oral Cap 360 capsule 0 3/23/2020    Sig:   TAKE 2 CAPSULES BY MOUTH TWICE DAILY           Next OV   Future Appointments   Date Time Provider Steffi Jones   7/20/2020  2:40 PM P

## 2020-08-04 ENCOUNTER — PATIENT MESSAGE (OUTPATIENT)
Dept: FAMILY MEDICINE CLINIC | Facility: CLINIC | Age: 60
End: 2020-08-04

## 2020-08-04 ENCOUNTER — APPOINTMENT (OUTPATIENT)
Dept: LAB | Facility: HOSPITAL | Age: 60
End: 2020-08-04
Attending: FAMILY MEDICINE
Payer: COMMERCIAL

## 2020-08-04 DIAGNOSIS — E11.9 CONTROLLED TYPE 2 DIABETES MELLITUS WITHOUT COMPLICATION, WITHOUT LONG-TERM CURRENT USE OF INSULIN (HCC): ICD-10-CM

## 2020-08-04 DIAGNOSIS — E78.2 MIXED HYPERLIPIDEMIA: ICD-10-CM

## 2020-08-04 LAB
ALBUMIN SERPL-MCNC: 3.8 G/DL (ref 3.4–5)
ALBUMIN/GLOB SERPL: 1.4 {RATIO} (ref 1–2)
ALP LIVER SERPL-CCNC: 67 U/L (ref 45–117)
ALT SERPL-CCNC: 39 U/L (ref 16–61)
ANION GAP SERPL CALC-SCNC: 3 MMOL/L (ref 0–18)
AST SERPL-CCNC: 15 U/L (ref 15–37)
BILIRUB SERPL-MCNC: 0.5 MG/DL (ref 0.1–2)
BUN BLD-MCNC: 23 MG/DL (ref 7–18)
BUN/CREAT SERPL: 19.2 (ref 10–20)
CALCIUM BLD-MCNC: 9 MG/DL (ref 8.5–10.1)
CHLORIDE SERPL-SCNC: 107 MMOL/L (ref 98–112)
CHOLEST SMN-MCNC: 147 MG/DL (ref ?–200)
CO2 SERPL-SCNC: 29 MMOL/L (ref 21–32)
CREAT BLD-MCNC: 1.2 MG/DL (ref 0.7–1.3)
CREAT UR-SCNC: 268 MG/DL
EST. AVERAGE GLUCOSE BLD GHB EST-MCNC: 137 MG/DL (ref 68–126)
GLOBULIN PLAS-MCNC: 2.8 G/DL (ref 2.8–4.4)
GLUCOSE BLD-MCNC: 136 MG/DL (ref 70–99)
HBA1C MFR BLD HPLC: 6.4 % (ref ?–5.7)
HDLC SERPL-MCNC: 34 MG/DL (ref 40–59)
LDLC SERPL CALC-MCNC: 35 MG/DL (ref ?–100)
M PROTEIN MFR SERPL ELPH: 6.6 G/DL (ref 6.4–8.2)
MICROALBUMIN UR-MCNC: 2.09 MG/DL
MICROALBUMIN/CREAT 24H UR-RTO: 7.8 UG/MG (ref ?–30)
NONHDLC SERPL-MCNC: 113 MG/DL (ref ?–130)
OSMOLALITY SERPL CALC.SUM OF ELEC: 294 MOSM/KG (ref 275–295)
PATIENT FASTING Y/N/NP: YES
PATIENT FASTING Y/N/NP: YES
POTASSIUM SERPL-SCNC: 4.4 MMOL/L (ref 3.5–5.1)
SODIUM SERPL-SCNC: 139 MMOL/L (ref 136–145)
TRIGL SERPL-MCNC: 392 MG/DL (ref 30–149)
VLDLC SERPL CALC-MCNC: 78 MG/DL (ref 0–30)

## 2020-08-04 PROCEDURE — 82570 ASSAY OF URINE CREATININE: CPT

## 2020-08-04 PROCEDURE — 82043 UR ALBUMIN QUANTITATIVE: CPT

## 2020-08-04 PROCEDURE — 83036 HEMOGLOBIN GLYCOSYLATED A1C: CPT

## 2020-08-04 PROCEDURE — 36415 COLL VENOUS BLD VENIPUNCTURE: CPT

## 2020-08-04 PROCEDURE — 80061 LIPID PANEL: CPT

## 2020-08-04 PROCEDURE — 80053 COMPREHEN METABOLIC PANEL: CPT

## 2020-08-05 NOTE — TELEPHONE ENCOUNTER
From: Joao Ventura  To: Darnell Garcia MD  Sent: 8/4/2020 6:54 PM CDT  Subject: Visit Drake Nayak,    With my current test results should I still come in for my physical on Friday?  Let me know I'm on vacation for the next 10

## 2020-08-07 ENCOUNTER — OFFICE VISIT (OUTPATIENT)
Dept: FAMILY MEDICINE CLINIC | Facility: CLINIC | Age: 60
End: 2020-08-07
Payer: COMMERCIAL

## 2020-08-07 ENCOUNTER — TELEPHONE (OUTPATIENT)
Dept: FAMILY MEDICINE CLINIC | Facility: CLINIC | Age: 60
End: 2020-08-07

## 2020-08-07 VITALS
RESPIRATION RATE: 16 BRPM | DIASTOLIC BLOOD PRESSURE: 70 MMHG | HEART RATE: 51 BPM | BODY MASS INDEX: 33.69 KG/M2 | TEMPERATURE: 98 F | OXYGEN SATURATION: 99 % | SYSTOLIC BLOOD PRESSURE: 152 MMHG | WEIGHT: 246 LBS | HEIGHT: 71.5 IN

## 2020-08-07 DIAGNOSIS — Z95.5 STENTED CORONARY ARTERY: Primary | ICD-10-CM

## 2020-08-07 DIAGNOSIS — Z00.00 WELL ADULT EXAM: ICD-10-CM

## 2020-08-07 DIAGNOSIS — Z98.890 STATUS POST CAROTID ENDARTERECTOMY: ICD-10-CM

## 2020-08-07 DIAGNOSIS — Z91.89 ENCOUNTER FOR SCREENING FOR COLORECTAL CANCER IN HIGH RISK PATIENT: ICD-10-CM

## 2020-08-07 DIAGNOSIS — I10 ESSENTIAL HYPERTENSION, BENIGN: ICD-10-CM

## 2020-08-07 DIAGNOSIS — Z12.11 ENCOUNTER FOR SCREENING FOR COLORECTAL CANCER IN HIGH RISK PATIENT: ICD-10-CM

## 2020-08-07 DIAGNOSIS — I25.10 ATHEROSCLEROSIS OF NATIVE CORONARY ARTERY OF NATIVE HEART WITHOUT ANGINA PECTORIS: ICD-10-CM

## 2020-08-07 DIAGNOSIS — E78.2 MIXED HYPERLIPIDEMIA: ICD-10-CM

## 2020-08-07 DIAGNOSIS — Z12.12 ENCOUNTER FOR SCREENING FOR COLORECTAL CANCER IN HIGH RISK PATIENT: ICD-10-CM

## 2020-08-07 PROCEDURE — 3008F BODY MASS INDEX DOCD: CPT | Performed by: FAMILY MEDICINE

## 2020-08-07 PROCEDURE — 3078F DIAST BP <80 MM HG: CPT | Performed by: FAMILY MEDICINE

## 2020-08-07 PROCEDURE — 3077F SYST BP >= 140 MM HG: CPT | Performed by: FAMILY MEDICINE

## 2020-08-07 PROCEDURE — 99396 PREV VISIT EST AGE 40-64: CPT | Performed by: FAMILY MEDICINE

## 2020-08-07 RX ORDER — ATORVASTATIN CALCIUM 40 MG/1
TABLET, FILM COATED ORAL
Qty: 90 TABLET | Refills: 1 | Status: SHIPPED | OUTPATIENT
Start: 2020-08-07 | End: 2021-02-06

## 2020-08-07 RX ORDER — METFORMIN HYDROCHLORIDE 500 MG/1
500 TABLET, EXTENDED RELEASE ORAL DAILY
Qty: 90 TABLET | Refills: 1 | Status: SHIPPED | OUTPATIENT
Start: 2020-08-07 | End: 2021-02-06

## 2020-08-07 RX ORDER — OMEGA-3-ACID ETHYL ESTERS 1 G/1
2 CAPSULE, LIQUID FILLED ORAL 2 TIMES DAILY
Qty: 360 CAPSULE | Refills: 1 | Status: SHIPPED | OUTPATIENT
Start: 2020-08-07 | End: 2021-02-06

## 2020-08-07 RX ORDER — LOSARTAN POTASSIUM 100 MG/1
100 TABLET ORAL DAILY
Qty: 90 TABLET | Refills: 1 | Status: SHIPPED | OUTPATIENT
Start: 2020-08-07 | End: 2021-02-06

## 2020-08-07 NOTE — PATIENT INSTRUCTIONS
Call if you get another gout attack. Colchicine 2 pills at onset of pain and 1 an hour later. The 3 pills shorten course.  Steroid also can be added for a few days (e.g. Prednisone 20 mg twice a day for 3 days.) we could try tramadol or tylenol with codeine but are used to determine if more testing is needed. Health counseling is essential, too. Below are guidelines for these, for men ages 48 to 59. Talk with your healthcare provider to make sure you’re up-to-date on what you need.   Screening Who needs it How rectal exam (ARMANDO) and prostate-specific antigen (PSA) screening1 At routine exams   Syphilis Men at increased risk for infection – talk with your healthcare provider At routine exams   Tuberculosis Men at increased risk for infection – talk with your healt after age 25, then Td every 8 years   Zoster All men ages 61 and older 1 dose   Counseling Who needs it How often   Diet and exercise Men who are overweight or obese When diagnosed, and then at routine exams   Sexually transmitted infection prevention Men

## 2020-08-07 NOTE — PROGRESS NOTES
Jose Luis Laws is a 62year old male here for Patient presents with: Well Adult  .     HPI:    Patient complains of here for well exam.  Current concerns:    Bruising-- had labs, minor bruises get large, started at time of TIA and had carotid done prior Ethyl Esters 1 g Oral Cap Take 2 capsules (2 g total) by mouth 2 (two) times daily. 360 capsule 1   • atorvastatin 40 MG Oral Tab TAKE 1 TABLET BY MOUTH NIGHTLY 90 tablet 1   • FLUoxetine HCl 20 MG Oral Cap Take 1 capsule (20 mg total) by mouth daily.  30 c since quittin.5      Smokeless tobacco: Former User      Tobacco comment: 7-8 yrs ago    Substance and Sexual Activity      Alcohol use: Yes        Alcohol/week: 0.0 standard drinks        Comment:  Occ      Drug use: No      Sexual activity: Not on fi relief with occasional pepcid. : nocturia   Male: sexual function is good. Rheumatologic: none  Derm/Skin: bruising as in HPI. Neuro: sleeps 7-8 hours  Psych: feels well.      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[employer])  Physical exam:    BP (!) 164/80 Glucose 08/04/2020 136* 70 - 99 mg/dL Final   • Sodium 08/04/2020 139  136 - 145 mmol/L Final   • Potassium 08/04/2020 4.4  3.5 - 5.1 mmol/L Final   • Chloride 08/04/2020 107  98 - 112 mmol/L Final   • CO2 08/04/2020 29.0  21.0 - 32.0 mmol/L Final   • Anio Desirable  <130 mg/dL   Borderline  130-159 mg/dL   High        160-189 mg/dL       Very high >=190 mg/dL       • FASTING 08/04/2020 Yes   Final   • HgbA1C 08/04/2020 6.4* <5.7 % Final     Normal HbA1C:     <5.7%      Pre-Diabetic:     5.7 - 6.4%      Diab For blood pressure: recheck at home for 2-3 weeks daily,  We can discuss via video visit in 3 weeks. Omron is a good brand of cuff.      Screening: Colon cancer screening recommended (colonoscopy once every 10 years, or screening of 3 stool samples on 3 Alcohol misuse All men in this age group At routine exams   Blood pressure All men in this age group Yearly checkup if your blood pressure is normal  Normal blood pressure is less than 120/80 mm Hg  If your blood pressure reading is higher than normal, f provider Ask your healthcare provider   Vision All men in this age group Ask your healthcare provider   Vaccine Who needs it How often   Chickenpox (varicella) All men in this age group who have no record of this infection or vaccine 2 doses; second dose s increased risk for infection – talk with your healthcare provider At routine exams   Use of daily aspirin Men in this age group at risk for cardiovascular health problems At routine exams   Use of tobacco and the health effects it can cause All men in this

## 2020-08-13 ENCOUNTER — TELEPHONE (OUTPATIENT)
Dept: FAMILY MEDICINE CLINIC | Facility: CLINIC | Age: 60
End: 2020-08-13

## 2021-01-11 ENCOUNTER — TELEPHONE (OUTPATIENT)
Dept: CARDIOLOGY | Age: 61
End: 2021-01-11

## 2021-01-27 ENCOUNTER — TELEPHONE (OUTPATIENT)
Dept: FAMILY MEDICINE CLINIC | Facility: CLINIC | Age: 61
End: 2021-01-27

## 2021-01-29 ENCOUNTER — TELEPHONE (OUTPATIENT)
Dept: FAMILY MEDICINE CLINIC | Facility: CLINIC | Age: 61
End: 2021-01-29

## 2021-01-29 ENCOUNTER — LAB ENCOUNTER (OUTPATIENT)
Dept: LAB | Age: 61
End: 2021-01-29
Attending: INTERNAL MEDICINE
Payer: COMMERCIAL

## 2021-01-29 DIAGNOSIS — Z01.818 PRE-OP TESTING: ICD-10-CM

## 2021-01-29 LAB — SARS-COV-2 RNA RESP QL NAA+PROBE: NOT DETECTED

## 2021-01-29 NOTE — TELEPHONE ENCOUNTER
Medical Record Request Received    Date received in office: 1/29/21    Requested from: [employer]    Records to be sent to: [employer]           Date request sent to [employer]: 1/29/21

## 2021-02-01 PROBLEM — Z12.11 SPECIAL SCREENING FOR MALIGNANT NEOPLASM OF COLON: Status: ACTIVE | Noted: 2021-02-01

## 2021-02-01 PROBLEM — D12.3 BENIGN NEOPLASM OF TRANSVERSE COLON: Status: ACTIVE | Noted: 2021-02-01

## 2021-02-02 NOTE — TELEPHONE ENCOUNTER
LOV 8/7/2020    LAST LAB 8/4/2020    LAST RX   losartan 100 MG Oral Tab 90 tablet 1 8/7/2020         Next OV  No future appointments.       PROTOCOL passed

## 2021-02-02 NOTE — TELEPHONE ENCOUNTER
LOV 8/7/2020    LAST LAB 8/4/2020    LAST RX  metFORMIN HCl  MG Oral Tablet 24 Hr 90 tablet 1 8/7/2020         Next OV No future appointments.       PROTOCOL failed

## 2021-02-06 ENCOUNTER — OFFICE VISIT (OUTPATIENT)
Dept: FAMILY MEDICINE CLINIC | Facility: CLINIC | Age: 61
End: 2021-02-06
Payer: COMMERCIAL

## 2021-02-06 VITALS
SYSTOLIC BLOOD PRESSURE: 132 MMHG | TEMPERATURE: 98 F | DIASTOLIC BLOOD PRESSURE: 72 MMHG | BODY MASS INDEX: 33.86 KG/M2 | RESPIRATION RATE: 18 BRPM | HEIGHT: 72 IN | WEIGHT: 250 LBS | HEART RATE: 68 BPM | OXYGEN SATURATION: 97 %

## 2021-02-06 DIAGNOSIS — I65.23 ASYMPTOMATIC CAROTID ARTERY STENOSIS, BILATERAL: ICD-10-CM

## 2021-02-06 DIAGNOSIS — E11.9 DIET-CONTROLLED DIABETES MELLITUS (HCC): ICD-10-CM

## 2021-02-06 DIAGNOSIS — I10 ESSENTIAL HYPERTENSION, BENIGN: ICD-10-CM

## 2021-02-06 DIAGNOSIS — E78.2 MIXED HYPERLIPIDEMIA: ICD-10-CM

## 2021-02-06 DIAGNOSIS — L84 CALLUS OF FOOT: Primary | ICD-10-CM

## 2021-02-06 DIAGNOSIS — I25.10 CORONARY ARTERY CALCIFICATION SEEN ON CT SCAN: ICD-10-CM

## 2021-02-06 PROCEDURE — 3078F DIAST BP <80 MM HG: CPT | Performed by: FAMILY MEDICINE

## 2021-02-06 PROCEDURE — 3075F SYST BP GE 130 - 139MM HG: CPT | Performed by: FAMILY MEDICINE

## 2021-02-06 PROCEDURE — 3008F BODY MASS INDEX DOCD: CPT | Performed by: FAMILY MEDICINE

## 2021-02-06 PROCEDURE — 99214 OFFICE O/P EST MOD 30 MIN: CPT | Performed by: FAMILY MEDICINE

## 2021-02-06 RX ORDER — METFORMIN HYDROCHLORIDE 500 MG/1
500 TABLET, EXTENDED RELEASE ORAL DAILY
Qty: 90 TABLET | Refills: 1 | Status: SHIPPED | OUTPATIENT
Start: 2021-02-06 | End: 2021-08-09

## 2021-02-06 RX ORDER — OMEGA-3-ACID ETHYL ESTERS 1 G/1
2 CAPSULE, LIQUID FILLED ORAL 2 TIMES DAILY
Qty: 360 CAPSULE | Refills: 1 | Status: SHIPPED | OUTPATIENT
Start: 2021-02-06

## 2021-02-06 RX ORDER — LOSARTAN POTASSIUM 100 MG/1
100 TABLET ORAL DAILY
Qty: 90 TABLET | Refills: 1 | Status: SHIPPED | OUTPATIENT
Start: 2021-02-06 | End: 2021-08-09

## 2021-02-06 RX ORDER — ATORVASTATIN CALCIUM 40 MG/1
TABLET, FILM COATED ORAL
Qty: 90 TABLET | Refills: 1 | Status: SHIPPED | OUTPATIENT
Start: 2021-02-06 | End: 2021-04-13

## 2021-02-06 RX ORDER — AMLODIPINE BESYLATE 5 MG/1
5 TABLET ORAL DAILY
Qty: 30 TABLET | Refills: 1 | Status: SHIPPED | OUTPATIENT
Start: 2021-02-06 | End: 2021-05-14

## 2021-02-06 NOTE — PATIENT INSTRUCTIONS
Amlodipine 5 mg daily added to losartan to control BP    Continue metformin and atenolol, atorvastatin and other current meds     Lab tests soon. Also see cardiology.     Please ask [facility] to forward your diabetic eye exam info    Recheck 6 months if diab

## 2021-02-06 NOTE — PROGRESS NOTES
Magali Reyes IS A 56year old male HERE FOR Patient presents with:  Blood Pressure       History of present illness:     Stir carmely, rowing for exercise. Young Do off bike onto [employer] in October, 6 weeks or so to stop aching.      BP for colonoscopy 180/7 by mouth 2 (two) times daily. 360 capsule 1   • atorvastatin 40 MG Oral Tab TAKE 1 TABLET BY MOUTH NIGHTLY 90 tablet 1   • Nutritional Supplements (PROSTATE ENZYME FORMULA OR) Take by mouth.  Super beta prostate     • FLUoxetine HCl 20 MG Oral Cap Take 1 ca Inability: Not on file      Transportation needs        Medical: Not on file        Non-medical: Not on file    Tobacco Use      Smoking status: Former Smoker        Packs/day: 0.00        Years: 0.00        Pack years: 0        Quit date: 1/28/2008 callus    Exam:     /72   Pulse 68   Temp 98.1 °F (36.7 °C) (Temporal)   Resp 18   Ht 6' (1.829 m)   Wt 250 lb (113.4 kg)   SpO2 97%   BMI 33.91 kg/m²      Lungs clear   Heart regular rhythm no S3 S4 murmur  Abdomen soft nontender without organomegal

## 2021-02-16 ENCOUNTER — TELEPHONE (OUTPATIENT)
Dept: CARDIOLOGY | Age: 61
End: 2021-02-16

## 2021-02-16 DIAGNOSIS — E78.2 MIXED HYPERLIPIDEMIA: Primary | ICD-10-CM

## 2021-02-22 ENCOUNTER — HOSPITAL ENCOUNTER (OUTPATIENT)
Dept: CARDIOLOGY CLINIC | Facility: HOSPITAL | Age: 61
Discharge: HOME | End: 2021-02-22
Attending: INTERNAL MEDICINE
Payer: COMMERCIAL

## 2021-02-22 DIAGNOSIS — I65.23 BILATERAL CAROTID ARTERY STENOSIS: ICD-10-CM

## 2021-02-22 DIAGNOSIS — E78.2 MIXED HYPERLIPIDEMIA: ICD-10-CM

## 2021-02-22 PROCEDURE — 93880 EXTRACRANIAL BILAT STUDY: CPT | Performed by: INTERNAL MEDICINE

## 2021-02-24 DIAGNOSIS — E78.2 MIXED HYPERLIPIDEMIA: ICD-10-CM

## 2021-03-01 ENCOUNTER — OFFICE VISIT (OUTPATIENT)
Dept: CARDIOLOGY | Age: 61
End: 2021-03-01

## 2021-03-01 ENCOUNTER — LAB ENCOUNTER (OUTPATIENT)
Dept: LAB | Age: 61
End: 2021-03-01
Attending: FAMILY MEDICINE
Payer: COMMERCIAL

## 2021-03-01 VITALS
DIASTOLIC BLOOD PRESSURE: 74 MMHG | HEIGHT: 72 IN | SYSTOLIC BLOOD PRESSURE: 146 MMHG | BODY MASS INDEX: 34.13 KG/M2 | HEART RATE: 60 BPM | WEIGHT: 252 LBS

## 2021-03-01 DIAGNOSIS — E78.2 MIXED HYPERLIPIDEMIA: ICD-10-CM

## 2021-03-01 DIAGNOSIS — I10 ESSENTIAL HYPERTENSION, BENIGN: ICD-10-CM

## 2021-03-01 DIAGNOSIS — R73.01 IFG (IMPAIRED FASTING GLUCOSE): ICD-10-CM

## 2021-03-01 DIAGNOSIS — I10 ESSENTIAL (PRIMARY) HYPERTENSION: ICD-10-CM

## 2021-03-01 DIAGNOSIS — Z95.5 STENTED CORONARY ARTERY: ICD-10-CM

## 2021-03-01 DIAGNOSIS — I25.10 CORONARY ARTERY CALCIFICATION SEEN ON CT SCAN: Primary | ICD-10-CM

## 2021-03-01 DIAGNOSIS — I25.10 CORONARY ARTERY DISEASE INVOLVING NATIVE CORONARY ARTERY OF NATIVE HEART WITHOUT ANGINA PECTORIS: ICD-10-CM

## 2021-03-01 DIAGNOSIS — Z98.890 STATUS POST CAROTID ENDARTERECTOMY: ICD-10-CM

## 2021-03-01 DIAGNOSIS — I65.23 ASYMPTOMATIC CAROTID ARTERY STENOSIS, BILATERAL: ICD-10-CM

## 2021-03-01 LAB
ALBUMIN SERPL-MCNC: 4.3 G/DL (ref 3.4–5)
ALBUMIN/GLOB SERPL: 1.4 {RATIO} (ref 1–2)
ALP LIVER SERPL-CCNC: 69 U/L
ALT SERPL-CCNC: 49 U/L
ANION GAP SERPL CALC-SCNC: 7 MMOL/L (ref 0–18)
AST SERPL-CCNC: 28 U/L (ref 15–37)
BILIRUB SERPL-MCNC: 0.7 MG/DL (ref 0.1–2)
BUN BLD-MCNC: 28 MG/DL (ref 7–18)
BUN/CREAT SERPL: 20.4 (ref 10–20)
CALCIUM BLD-MCNC: 10 MG/DL (ref 8.5–10.1)
CHLORIDE SERPL-SCNC: 103 MMOL/L (ref 98–112)
CHOLEST SMN-MCNC: 160 MG/DL (ref ?–200)
CO2 SERPL-SCNC: 27 MMOL/L (ref 21–32)
CREAT BLD-MCNC: 1.37 MG/DL
CREAT UR-SCNC: 172 MG/DL
EST. AVERAGE GLUCOSE BLD GHB EST-MCNC: 151 MG/DL (ref 68–126)
GLOBULIN PLAS-MCNC: 3 G/DL (ref 2.8–4.4)
GLUCOSE BLD-MCNC: 134 MG/DL (ref 70–99)
HBA1C MFR BLD HPLC: 6.9 % (ref ?–5.7)
HDLC SERPL-MCNC: 35 MG/DL (ref 40–59)
LDLC SERPL DIRECT ASSAY-MCNC: 66 MG/DL (ref ?–100)
M PROTEIN MFR SERPL ELPH: 7.3 G/DL (ref 6.4–8.2)
MICROALBUMIN UR-MCNC: 1.74 MG/DL
MICROALBUMIN/CREAT 24H UR-RTO: 10.1 UG/MG (ref ?–30)
NONHDLC SERPL-MCNC: 125 MG/DL (ref ?–130)
OSMOLALITY SERPL CALC.SUM OF ELEC: 291 MOSM/KG (ref 275–295)
PATIENT FASTING Y/N/NP: YES
PATIENT FASTING Y/N/NP: YES
POTASSIUM SERPL-SCNC: 4.8 MMOL/L (ref 3.5–5.1)
SODIUM SERPL-SCNC: 137 MMOL/L (ref 136–145)
TRIGL SERPL-MCNC: 488 MG/DL (ref 30–149)

## 2021-03-01 PROCEDURE — 83721 ASSAY OF BLOOD LIPOPROTEIN: CPT | Performed by: FAMILY MEDICINE

## 2021-03-01 PROCEDURE — 3044F HG A1C LEVEL LT 7.0%: CPT | Performed by: FAMILY MEDICINE

## 2021-03-01 PROCEDURE — 80053 COMPREHEN METABOLIC PANEL: CPT | Performed by: FAMILY MEDICINE

## 2021-03-01 PROCEDURE — 36415 COLL VENOUS BLD VENIPUNCTURE: CPT | Performed by: FAMILY MEDICINE

## 2021-03-01 PROCEDURE — 99244 OFF/OP CNSLTJ NEW/EST MOD 40: CPT | Performed by: INTERNAL MEDICINE

## 2021-03-01 PROCEDURE — 83036 HEMOGLOBIN GLYCOSYLATED A1C: CPT | Performed by: FAMILY MEDICINE

## 2021-03-01 PROCEDURE — 3078F DIAST BP <80 MM HG: CPT | Performed by: INTERNAL MEDICINE

## 2021-03-01 PROCEDURE — 3061F NEG MICROALBUMINURIA REV: CPT | Performed by: FAMILY MEDICINE

## 2021-03-01 PROCEDURE — 82570 ASSAY OF URINE CREATININE: CPT | Performed by: FAMILY MEDICINE

## 2021-03-01 PROCEDURE — 3077F SYST BP >= 140 MM HG: CPT | Performed by: INTERNAL MEDICINE

## 2021-03-01 PROCEDURE — 82043 UR ALBUMIN QUANTITATIVE: CPT | Performed by: FAMILY MEDICINE

## 2021-03-01 PROCEDURE — 80061 LIPID PANEL: CPT | Performed by: FAMILY MEDICINE

## 2021-03-01 ASSESSMENT — PATIENT HEALTH QUESTIONNAIRE - PHQ9
2. FEELING DOWN, DEPRESSED OR HOPELESS: NOT AT ALL
SUM OF ALL RESPONSES TO PHQ9 QUESTIONS 1 AND 2: 0
CLINICAL INTERPRETATION OF PHQ9 SCORE: NO FURTHER SCREENING NEEDED
CLINICAL INTERPRETATION OF PHQ2 SCORE: NO FURTHER SCREENING NEEDED
SUM OF ALL RESPONSES TO PHQ9 QUESTIONS 1 AND 2: 0
1. LITTLE INTEREST OR PLEASURE IN DOING THINGS: NOT AT ALL

## 2021-03-01 ASSESSMENT — ENCOUNTER SYMPTOMS
COUGH: 0
WEIGHT LOSS: 0
WEIGHT GAIN: 0
CHILLS: 0
ALLERGIC/IMMUNOLOGIC COMMENTS: NO NEW FOOD ALLERGIES
BRUISES/BLEEDS EASILY: 0
HEMATOCHEZIA: 0
HEMOPTYSIS: 0
FEVER: 0
SUSPICIOUS LESIONS: 0

## 2021-03-17 DIAGNOSIS — Z23 NEED FOR VACCINATION: ICD-10-CM

## 2021-03-23 ENCOUNTER — IMMUNIZATION (OUTPATIENT)
Dept: LAB | Facility: HOSPITAL | Age: 61
End: 2021-03-23
Attending: HOSPITALIST
Payer: COMMERCIAL

## 2021-03-23 DIAGNOSIS — Z23 NEED FOR VACCINATION: Primary | ICD-10-CM

## 2021-03-23 PROCEDURE — 0011A SARSCOV2 VAC 100MCG/0.5ML IM: CPT

## 2021-04-13 ENCOUNTER — APPOINTMENT (OUTPATIENT)
Dept: GENERAL RADIOLOGY | Facility: HOSPITAL | Age: 61
End: 2021-04-13
Attending: EMERGENCY MEDICINE
Payer: COMMERCIAL

## 2021-04-13 ENCOUNTER — HOSPITAL ENCOUNTER (OUTPATIENT)
Facility: HOSPITAL | Age: 61
Setting detail: OBSERVATION
Discharge: HOME | End: 2021-04-14
Attending: EMERGENCY MEDICINE | Admitting: HOSPITALIST
Payer: COMMERCIAL

## 2021-04-13 ENCOUNTER — APPOINTMENT (OUTPATIENT)
Dept: CV DIAGNOSTICS | Facility: HOSPITAL | Age: 61
End: 2021-04-13
Attending: INTERNAL MEDICINE
Payer: COMMERCIAL

## 2021-04-13 DIAGNOSIS — I48.92 ATRIAL FLUTTER WITH RAPID VENTRICULAR RESPONSE (HCC): ICD-10-CM

## 2021-04-13 DIAGNOSIS — I48.92 NEW ONSET ATRIAL FLUTTER (HCC): Primary | ICD-10-CM

## 2021-04-13 PROCEDURE — 99220 INITIAL OBSERVATION CARE,LEVL III: CPT | Performed by: INTERNAL MEDICINE

## 2021-04-13 PROCEDURE — 99245 OFF/OP CONSLTJ NEW/EST HI 55: CPT | Performed by: INTERNAL MEDICINE

## 2021-04-13 PROCEDURE — 71045 X-RAY EXAM CHEST 1 VIEW: CPT | Performed by: EMERGENCY MEDICINE

## 2021-04-13 RX ORDER — ASPIRIN 81 MG/1
81 TABLET ORAL DAILY
COMMUNITY

## 2021-04-13 RX ORDER — ACETAMINOPHEN 325 MG/1
650 TABLET ORAL EVERY 6 HOURS PRN
Status: DISCONTINUED | OUTPATIENT
Start: 2021-04-13 | End: 2021-04-14

## 2021-04-13 RX ORDER — ATORVASTATIN CALCIUM 40 MG/1
40 TABLET, FILM COATED ORAL NIGHTLY
Status: DISCONTINUED | OUTPATIENT
Start: 2021-04-13 | End: 2021-04-14

## 2021-04-13 RX ORDER — CLONAZEPAM 0.5 MG/1
0.5 TABLET ORAL NIGHTLY PRN
Status: DISCONTINUED | OUTPATIENT
Start: 2021-04-13 | End: 2021-04-14

## 2021-04-13 RX ORDER — ATENOLOL 25 MG/1
25 TABLET ORAL DAILY
Status: DISCONTINUED | OUTPATIENT
Start: 2021-04-13 | End: 2021-04-14

## 2021-04-13 RX ORDER — DEXTROSE MONOHYDRATE 25 G/50ML
50 INJECTION, SOLUTION INTRAVENOUS
Status: DISCONTINUED | OUTPATIENT
Start: 2021-04-13 | End: 2021-04-14

## 2021-04-13 RX ORDER — FLUOXETINE HYDROCHLORIDE 20 MG/1
20 CAPSULE ORAL NIGHTLY
Status: DISCONTINUED | OUTPATIENT
Start: 2021-04-13 | End: 2021-04-14

## 2021-04-13 RX ORDER — LOSARTAN POTASSIUM 100 MG/1
100 TABLET ORAL DAILY
Status: DISCONTINUED | OUTPATIENT
Start: 2021-04-13 | End: 2021-04-14

## 2021-04-13 RX ORDER — SODIUM CHLORIDE 9 MG/ML
INJECTION, SOLUTION INTRAVENOUS CONTINUOUS
Status: DISCONTINUED | OUTPATIENT
Start: 2021-04-13 | End: 2021-04-13

## 2021-04-13 RX ORDER — ASPIRIN 81 MG/1
324 TABLET, CHEWABLE ORAL ONCE
Status: DISCONTINUED | OUTPATIENT
Start: 2021-04-13 | End: 2021-04-13

## 2021-04-13 RX ORDER — ATORVASTATIN CALCIUM 40 MG/1
40 TABLET, FILM COATED ORAL NIGHTLY
COMMUNITY
End: 2021-10-09

## 2021-04-13 RX ORDER — ASPIRIN 81 MG/1
81 TABLET ORAL DAILY
Status: DISCONTINUED | OUTPATIENT
Start: 2021-04-14 | End: 2021-04-14

## 2021-04-13 RX ORDER — ONDANSETRON 2 MG/ML
4 INJECTION INTRAMUSCULAR; INTRAVENOUS EVERY 6 HOURS PRN
Status: DISCONTINUED | OUTPATIENT
Start: 2021-04-13 | End: 2021-04-14

## 2021-04-13 RX ORDER — MELATONIN
3 NIGHTLY PRN
Status: DISCONTINUED | OUTPATIENT
Start: 2021-04-13 | End: 2021-04-14

## 2021-04-13 NOTE — ED PROVIDER NOTES
Patient Seen in: [facility] Emergency Department      History   Patient presents with:  Palpitations    Stated Complaint: a-fib, palpitaitons    HPI/Subjective:   HPI    26-year-old male comes to the hospital stating that he was feeling that his hea per pt   • OTHER SURGICAL HISTORY                  Social History    Tobacco Use      Smoking status: Former Smoker        Packs/day: 0.00        Years: 0.00        Pack years: 0        Quit date: 2008        Years since quittin.2      Smokeless 11.8 (*)     RBC 5.83 (*)     Lymphocyte Absolute 4.62 (*)     All other components within normal limits   TROPONIN I - Normal   RAPID SARS-COV-2 BY PCR - Normal   CBC WITH DIFFERENTIAL WITH PLATELET    Narrative:      The following orders were created for associated.  The services I provided  were to promote improvement and reduce mortality specifically involving complex record review, complex medical decisions and interventions, and consultations outside the regular procedures and care normally rendered for

## 2021-04-13 NOTE — PLAN OF CARE
Patient is alert and oriented, denies pain. Admission completed to CTU 8. Patient converted from A-fib to NSB at 1348, HR 40s-50s; cardizem gtt stopped. Cardiology team notified of change in heart rhythm.  Family and patient updated on plan of care; stress

## 2021-04-13 NOTE — ED INITIAL ASSESSMENT (HPI)
Pt was sleeping when his watch told him he was in Afib and advised him to call his doctor. Pt states when he checked pulse he felt it was irregular. Pt denies any chest pain at this time. Pt states he felt a little short of breath when walking.

## 2021-04-13 NOTE — CONSULTS
[facility]  Cardiology Consultation    Michel Padgett Patient Status:  Emergency    1960 MRN [id number]   Location [address] [facility] Attending Neto Kearns MD   Hosp Day # 0 PCP Karol Hennessy MD     Reason for [facility] ago    5 yrs ago per pt   • OTHER SURGICAL HISTORY       Family History   Problem Relation Age of Onset   • Other (GI cancer[other]) Other         granfather, live GB panc   • Heart Attack Other         fam hx   • Depression Other         fam hx   • Alcoho JVD, carotids 2+, no bruits. Cardiac: irregularly irregular, no murmur  Lungs: Clear without wheezes, rales, rhonchi or dullness. Normal excursions and effort. Abdomen: Soft, non-tender.   Extremities: Without edema  Neurologic: Alert and oriented, francia for allowing me to participate in the care of your patient.     Maria De Jesus Wayne MD  4/13/2021  11:32 AM

## 2021-04-13 NOTE — H&P
[facility]  History and Physical     Mandy Mckenzie Patient Status:  Observation    1960 MRN [id number]   [facility] 8NE-A Attending Fernanda Marrufo, [address] [facility] Day # 0 PCP Alla Bustillo MD     Chief Complaint: Elevated he obstruction and other lower urinary tract symptoms (LUTS)    • Impaired fasting glucose    • Nocturia    • Pure hypercholesterolemia    • Reflux esophagitis    • Stented coronary artery 2012   • Stress    • Unspecified hypothyroidism    • Wears glasses Supplements (PROSTATE ENZYME FORMULA OR), Take 1 tablet by mouth daily. Super beta prostate  , Disp: , Rfl:   FLUoxetine HCl 20 MG Oral Cap, Take 1 capsule (20 mg total) by mouth daily. , Disp: 30 capsule, Rfl: 1  ClonazePAM 0.5 MG Oral Tab, Take 1 tablet ( 17.1   MCV 86.3   .0     Recent Labs   Lab 04/13/21  0929   *   BUN 21*   CREATSERUM 1.48*   GFRAA 58*   GFRNAA 50*   CA 9.4   ALB 4.4   *   K 3.8      CO2 27.0   ALKPHO 79   AST 26   ALT 47   BILT 0.7   TP 7.8     Estimated Creat

## 2021-04-14 ENCOUNTER — APPOINTMENT (OUTPATIENT)
Dept: CV DIAGNOSTICS | Facility: HOSPITAL | Age: 61
End: 2021-04-14
Attending: INTERNAL MEDICINE
Payer: COMMERCIAL

## 2021-04-14 VITALS
RESPIRATION RATE: 16 BRPM | WEIGHT: 249.31 LBS | HEART RATE: 92 BPM | DIASTOLIC BLOOD PRESSURE: 77 MMHG | SYSTOLIC BLOOD PRESSURE: 142 MMHG | BODY MASS INDEX: 34 KG/M2 | TEMPERATURE: 98 F | OXYGEN SATURATION: 98 %

## 2021-04-14 PROCEDURE — 93017 CV STRESS TEST TRACING ONLY: CPT | Performed by: INTERNAL MEDICINE

## 2021-04-14 PROCEDURE — 78452 HT MUSCLE IMAGE SPECT MULT: CPT | Performed by: INTERNAL MEDICINE

## 2021-04-14 PROCEDURE — 99214 OFFICE O/P EST MOD 30 MIN: CPT | Performed by: NURSE PRACTITIONER

## 2021-04-14 PROCEDURE — 93306 TTE W/DOPPLER COMPLETE: CPT | Performed by: INTERNAL MEDICINE

## 2021-04-14 PROCEDURE — 99217 OBSERVATION CARE DISCHARGE: CPT | Performed by: HOSPITALIST

## 2021-04-14 PROCEDURE — 93018 CV STRESS TEST I&R ONLY: CPT | Performed by: INTERNAL MEDICINE

## 2021-04-14 RX ORDER — LEVOTHYROXINE SODIUM 0.07 MG/1
75 TABLET ORAL
Qty: 30 TABLET | Refills: 0 | Status: SHIPPED | OUTPATIENT
Start: 2021-04-14 | End: 2021-04-16

## 2021-04-14 RX ORDER — AMLODIPINE BESYLATE 5 MG/1
5 TABLET ORAL DAILY
Status: DISCONTINUED | OUTPATIENT
Start: 2021-04-14 | End: 2021-04-14

## 2021-04-14 RX ORDER — LEVOTHYROXINE SODIUM 0.07 MG/1
75 TABLET ORAL
Qty: 30 TABLET | Refills: 0 | Status: SHIPPED | OUTPATIENT
Start: 2021-04-14 | End: 2021-04-14

## 2021-04-14 NOTE — DISCHARGE SUMMARY
[facility]OSPITAL[facility]  DISCHARGE SUMMARY     Renetta Jamie Patient Status:  Observation    1960 MRN [id number]   [facility] 8NE-A Attending René Hurst MD   Hosp Day # 0 PCP Francis Garcia MD     Date of Admission: 2021  Da after discharge from the hospital.    Procedures during hospitalization:   • See above    Incidental or significant findings and recommendations (brief descriptions):  • none    Lab/Test results pending at Discharge:   · none    Consultants:  • josh cards LOVAZA      Take 2 capsules (2 g total) by mouth 2 (two) times daily. Quantity: 360 capsule  Refills: 1     PROSTATE ENZYME FORMULA OR      Take 1 tablet by mouth daily.  Super beta prostate   Refills: 0     Tab-A-Sheeba Tabs      Take 1 tablet by mouth cameron competing for audio resources)  2. Disable Bluetooth  3.       Reboot mobile device before joining the video  4. Come off Wi-Fi and switch over to Data    Please see our Video Visit Tip Sheet if you need additional assistance.      If you believ version  [email or web address] pdf     COVID-19 Vaccine Consent Form for Employees of [address].

## 2021-04-14 NOTE — PLAN OF CARE
Patient alert and oriented  x4  Pleasant and cooperative on room air ,sinus cheryl on tele , HR  50's and afebrile , denies any chest pain/ chest diascomfort ,treadmil breakfast offered  educated for stress test and Echo in am , slept well , morning reporte

## 2021-04-14 NOTE — PROGRESS NOTES
Cardiac Diagnostic Note:    Pt walked 9:41 pm David Protocol for nuclear stress test  Pt denied cardiac symptoms  Few pvcs  nuc pending.

## 2021-04-14 NOTE — PROGRESS NOTES
[facility]  Cardiology Progress Note    Subjective:  No chest pain or shortness of breath. Just back from nuclear stress testing.       Objective:  /79 (BP Location: Left arm)   Pulse 93   Temp 97.9 °F (36.6 °C) (Oral)   Resp 18   Wt 249 lb 5.4 TSH 5.27, Free T4 0.7. Per Dr. Elena Mireles. Plan:    - [id number] = ~2 (HTN/vasc dz). Initiated on Eliquis 5mg PO BID. Will check cost of DOAC today. - Continue atenolol/losartan/EC ASA/statin  - Triglycerides elevated.   Will discuss possible addition Detail Level: Zone

## 2021-04-14 NOTE — PROGRESS NOTES
Seen and examined; back in NSR. Feeling well. Echo and stress test today. If reassuring results, possible discharge later today.   T4 pending

## 2021-04-14 NOTE — CM/SW NOTE
Patient started on eliquis--script faxed to patient's listed [facility] [phone] [facility] (phone ) to check cost of eliquis--informed this medication requires a PA    Call placed to [facility] [phone]  (pt ID #[id number]

## 2021-04-14 NOTE — PLAN OF CARE
Denies c/o pain, malaise, or cardiac symptoms. Remains in SB, S1,S2, RRR. Lungs clear on RA. Negative for edema. Abdomen soft and non tender to touch with active bowel sounds in all four quadrants.   Tolerating all medications well with no adverse effec

## 2021-04-15 ENCOUNTER — PATIENT OUTREACH (OUTPATIENT)
Dept: CASE MANAGEMENT | Age: 61
End: 2021-04-15

## 2021-04-15 DIAGNOSIS — Z02.9 ENCOUNTERS FOR UNSPECIFIED ADMINISTRATIVE PURPOSE: ICD-10-CM

## 2021-04-16 ENCOUNTER — OFFICE VISIT (OUTPATIENT)
Dept: FAMILY MEDICINE CLINIC | Facility: CLINIC | Age: 61
End: 2021-04-16
Payer: COMMERCIAL

## 2021-04-16 VITALS
BODY MASS INDEX: 33.72 KG/M2 | OXYGEN SATURATION: 99 % | HEART RATE: 58 BPM | RESPIRATION RATE: 16 BRPM | WEIGHT: 249 LBS | TEMPERATURE: 97 F | SYSTOLIC BLOOD PRESSURE: 140 MMHG | DIASTOLIC BLOOD PRESSURE: 72 MMHG | HEIGHT: 72 IN

## 2021-04-16 DIAGNOSIS — E03.9 BORDERLINE HYPOTHYROIDISM: ICD-10-CM

## 2021-04-16 DIAGNOSIS — I48.0 PAROXYSMAL ATRIAL FIBRILLATION (HCC): Primary | ICD-10-CM

## 2021-04-16 DIAGNOSIS — I10 ESSENTIAL HYPERTENSION: ICD-10-CM

## 2021-04-16 DIAGNOSIS — E11.9 CONTROLLED TYPE 2 DIABETES MELLITUS WITHOUT COMPLICATION, WITHOUT LONG-TERM CURRENT USE OF INSULIN (HCC): ICD-10-CM

## 2021-04-16 DIAGNOSIS — R06.83 SNORING: ICD-10-CM

## 2021-04-16 DIAGNOSIS — E78.2 MIXED HYPERLIPIDEMIA: ICD-10-CM

## 2021-04-16 DIAGNOSIS — F41.0 PANIC DISORDER: ICD-10-CM

## 2021-04-16 PROBLEM — R73.01 IFG (IMPAIRED FASTING GLUCOSE): Status: ACTIVE | Noted: 2019-06-07

## 2021-04-16 PROCEDURE — 99214 OFFICE O/P EST MOD 30 MIN: CPT | Performed by: FAMILY MEDICINE

## 2021-04-16 PROCEDURE — 3078F DIAST BP <80 MM HG: CPT | Performed by: FAMILY MEDICINE

## 2021-04-16 PROCEDURE — 3077F SYST BP >= 140 MM HG: CPT | Performed by: FAMILY MEDICINE

## 2021-04-16 PROCEDURE — 3008F BODY MASS INDEX DOCD: CPT | Performed by: FAMILY MEDICINE

## 2021-04-16 NOTE — PROGRESS NOTES
Aj Najera IS A 56year old male HERE FOR No chief complaint on file.        History of present illness:     Felt his typical symptoms of panic attack Monday, palpitations fast heartbeat, sweating, tried breathing episodes, took clonopin x 2, it didn' Outpatient Medications   Medication Sig Dispense Refill   • apixaban 5 MG Oral Tab Take 1 tablet (5 mg total) by mouth 2 (two) times daily. 60 tablet 3   • aspirin 81 MG Oral Tab EC Take 81 mg by mouth daily.      • atorvastatin 40 MG Oral Tab Take 40 mg by children: 3      Years of education: Not on file      Highest education level: Not on file    Occupational History      Occupation:     Tobacco Use      Smoking status: Former Smoker        Packs/day: 0.00        Years: 0.00        Pack years: 0     Active Member of Clubs or Organizations:       Attends Club or Organization Meetings:       Marital Status:   Intimate Partner Violence:       Fear of Current or Ex-Partner:       Emotionally Abused:       Physically Abused:       Sexually Abused: weeks with thyroid antibody tests. Check with my partners at that time for advice on results.      Talk with your psychiatrist about going off or down on prozac or clonazepam.

## 2021-04-16 NOTE — PATIENT INSTRUCTIONS
Don't start thyroid until we get back in touch with you. Typically with new onset TSH borderline high we don't start thyroid, and if so would start 25 mcg then retest in 6 weeks and increase by 25 mcg as needed to a TSH of 0.5 to 2.0.      Sent message arlene

## 2021-04-16 NOTE — PROGRESS NOTES
[facility] planned to contact patient for TCM, however, TCM-Hospital FU appointment was completed on 4/16/2021. [facility] closing encounter.

## 2021-04-22 ENCOUNTER — MED REC SCAN ONLY (OUTPATIENT)
Dept: FAMILY MEDICINE CLINIC | Facility: CLINIC | Age: 61
End: 2021-04-22

## 2021-04-23 ENCOUNTER — IMMUNIZATION (OUTPATIENT)
Dept: LAB | Facility: HOSPITAL | Age: 61
End: 2021-04-23
Attending: EMERGENCY MEDICINE
Payer: COMMERCIAL

## 2021-04-23 DIAGNOSIS — Z23 NEED FOR VACCINATION: Primary | ICD-10-CM

## 2021-04-23 PROCEDURE — 0012A SARSCOV2 VAC 100MCG/0.5ML IM: CPT

## 2021-04-29 RX ORDER — ATENOLOL 25 MG/1
25 TABLET ORAL DAILY
Qty: 90 TABLET | Refills: 3 | Status: SHIPPED | OUTPATIENT
Start: 2021-04-29

## 2021-04-30 ENCOUNTER — TELEPHONE (OUTPATIENT)
Dept: CARDIOLOGY | Age: 61
End: 2021-04-30

## 2021-05-02 ENCOUNTER — OFFICE VISIT (OUTPATIENT)
Dept: SLEEP CENTER | Age: 61
End: 2021-05-02
Attending: INTERNAL MEDICINE
Payer: COMMERCIAL

## 2021-05-02 PROCEDURE — 95810 POLYSOM 6/> YRS 4/> PARAM: CPT

## 2021-05-12 RX ORDER — METFORMIN HYDROCHLORIDE 500 MG/1
TABLET, EXTENDED RELEASE ORAL
Qty: 90 TABLET | Refills: 1 | OUTPATIENT
Start: 2021-05-12

## 2021-05-12 RX ORDER — LOSARTAN POTASSIUM 100 MG/1
TABLET ORAL
Qty: 90 TABLET | Refills: 1 | OUTPATIENT
Start: 2021-05-12

## 2021-05-12 NOTE — TELEPHONE ENCOUNTER
metFORMIN HCl  MG Oral Tablet 24 Hr 90 tablet 1 2/6/2021    Sig:   Take 1 tablet (500 mg total) by mouth daily. losartan 100 MG Oral Tab 90 tablet 1 2/6/2021    Sig:   Take 1 tablet (100 mg total) by mouth daily.        DENIED AS DUPLICATE, INST

## 2021-05-14 RX ORDER — AMLODIPINE BESYLATE 5 MG/1
TABLET ORAL
Qty: 90 TABLET | Refills: 0 | Status: SHIPPED | OUTPATIENT
Start: 2021-05-14 | End: 2021-08-09

## 2021-05-14 NOTE — TELEPHONE ENCOUNTER
LOV 4/16/21    LAST LAB 4/13/21     LAST RX   amLODIPine Besylate 5 MG Oral Tab 30 tablet 1 2/6/2021 2/1/2022   Sig:   Take 1 tablet (5 mg total) by mouth daily.      Route:   Oral           Next OV   Future Appointments   Date Time Provider Department Cent

## 2021-06-07 ENCOUNTER — ORDER TRANSCRIPTION (OUTPATIENT)
Dept: SLEEP CENTER | Age: 61
End: 2021-06-07

## 2021-06-07 DIAGNOSIS — Z11.59 SCREENING FOR VIRAL DISEASE: ICD-10-CM

## 2021-06-07 DIAGNOSIS — Z01.818 PREOP EXAMINATION: Primary | ICD-10-CM

## 2021-06-16 ENCOUNTER — TELEPHONE (OUTPATIENT)
Dept: CARDIOLOGY | Age: 61
End: 2021-06-16

## 2021-06-24 ENCOUNTER — PATIENT OUTREACH (OUTPATIENT)
Dept: FAMILY MEDICINE CLINIC | Facility: CLINIC | Age: 61
End: 2021-06-24

## 2021-06-25 ENCOUNTER — OFFICE VISIT (OUTPATIENT)
Dept: SLEEP CENTER | Age: 61
End: 2021-06-25
Attending: INTERNAL MEDICINE
Payer: COMMERCIAL

## 2021-06-25 DIAGNOSIS — I48.0 PAROXYSMAL ATRIAL FIBRILLATION (HCC): ICD-10-CM

## 2021-06-25 DIAGNOSIS — R06.83 SNORING: ICD-10-CM

## 2021-06-25 DIAGNOSIS — G47.10 HYPERSOMNIA: ICD-10-CM

## 2021-06-25 DIAGNOSIS — G47.33 OSA (OBSTRUCTIVE SLEEP APNEA): ICD-10-CM

## 2021-06-25 PROCEDURE — 95811 POLYSOM 6/>YRS CPAP 4/> PARM: CPT

## 2021-07-28 ENCOUNTER — PATIENT OUTREACH (OUTPATIENT)
Dept: FAMILY MEDICINE CLINIC | Facility: CLINIC | Age: 61
End: 2021-07-28

## 2021-08-08 RX ORDER — METFORMIN HYDROCHLORIDE 500 MG/1
TABLET, EXTENDED RELEASE ORAL
Qty: 90 TABLET | Refills: 1 | Status: CANCELLED | OUTPATIENT
Start: 2021-08-08

## 2021-08-08 RX ORDER — LOSARTAN POTASSIUM 100 MG/1
TABLET ORAL
Qty: 90 TABLET | Refills: 1 | Status: CANCELLED | OUTPATIENT
Start: 2021-08-08

## 2021-08-08 RX ORDER — AMLODIPINE BESYLATE 5 MG/1
TABLET ORAL
Qty: 90 TABLET | Refills: 0 | Status: CANCELLED | OUTPATIENT
Start: 2021-08-08

## 2021-08-09 RX ORDER — METFORMIN HYDROCHLORIDE 500 MG/1
TABLET, EXTENDED RELEASE ORAL
Qty: 90 TABLET | Refills: 0 | Status: SHIPPED | OUTPATIENT
Start: 2021-08-09 | End: 2021-10-09

## 2021-08-09 RX ORDER — AMLODIPINE BESYLATE 5 MG/1
TABLET ORAL
Qty: 90 TABLET | Refills: 0 | Status: SHIPPED | OUTPATIENT
Start: 2021-08-09 | End: 2021-10-09

## 2021-08-09 RX ORDER — LOSARTAN POTASSIUM 100 MG/1
TABLET ORAL
Qty: 90 TABLET | Refills: 0 | Status: SHIPPED | OUTPATIENT
Start: 2021-08-09 | End: 2021-10-09

## 2021-08-09 NOTE — TELEPHONE ENCOUNTER
LOV 4/16/2    LAST LAB  3/1/21    LAST RX   losartan 100 MG Oral Tab 90 tablet 1 2/6/2021    Sig:   Take 1 tablet (100 mg total) by mouth daily.        AMLODIPINE BESYLATE 5 MG Oral Tab 90 tablet 0 5/14/2021     Sig: TAKE 1 TABLET(5 MG) BY MOUTH DAILY    Se

## 2021-09-09 ENCOUNTER — TELEPHONE (OUTPATIENT)
Dept: FAMILY MEDICINE CLINIC | Facility: CLINIC | Age: 61
End: 2021-09-09

## 2021-09-09 DIAGNOSIS — Z00.00 ROUTINE GENERAL MEDICAL EXAMINATION AT A HEALTH CARE FACILITY: Primary | ICD-10-CM

## 2021-09-09 NOTE — TELEPHONE ENCOUNTER
Pt called rescheduled his Physical amy from 10-2-21 with Dr Darío Santana to 10-9-21 with Dr Mir Chen. Pt said Dr Darío Santana always entered his blood work prior to amy due to his work schedule. Needs orders needed.

## 2021-09-09 NOTE — TELEPHONE ENCOUNTER
Last CPE 8/2020 with Dr. Jesse Santana    Last labs 3/1/21    Future Appointments   Date Time Provider Steffi Jones   10/9/2021  9:00 AM Sal Caal MD [facility]       Dr. Windy Sarmiento- would you like pt to complete labs prior to OV?   If so, fasting routine lab

## 2021-10-06 ENCOUNTER — LAB ENCOUNTER (OUTPATIENT)
Dept: LAB | Age: 61
End: 2021-10-06
Attending: FAMILY MEDICINE
Payer: COMMERCIAL

## 2021-10-06 DIAGNOSIS — E03.9 BORDERLINE HYPOTHYROIDISM: ICD-10-CM

## 2021-10-06 DIAGNOSIS — E11.9 CONTROLLED TYPE 2 DIABETES MELLITUS WITHOUT COMPLICATION, WITHOUT LONG-TERM CURRENT USE OF INSULIN (HCC): ICD-10-CM

## 2021-10-06 DIAGNOSIS — Z00.00 ROUTINE GENERAL MEDICAL EXAMINATION AT A HEALTH CARE FACILITY: ICD-10-CM

## 2021-10-06 PROCEDURE — 80061 LIPID PANEL: CPT | Performed by: FAMILY MEDICINE

## 2021-10-06 PROCEDURE — 80050 GENERAL HEALTH PANEL: CPT | Performed by: FAMILY MEDICINE

## 2021-10-06 PROCEDURE — 84153 ASSAY OF PSA TOTAL: CPT | Performed by: FAMILY MEDICINE

## 2021-10-06 PROCEDURE — 83036 HEMOGLOBIN GLYCOSYLATED A1C: CPT | Performed by: FAMILY MEDICINE

## 2021-10-06 PROCEDURE — 84439 ASSAY OF FREE THYROXINE: CPT | Performed by: FAMILY MEDICINE

## 2021-10-06 PROCEDURE — 86376 MICROSOMAL ANTIBODY EACH: CPT | Performed by: FAMILY MEDICINE

## 2021-10-06 PROCEDURE — 86800 THYROGLOBULIN ANTIBODY: CPT | Performed by: FAMILY MEDICINE

## 2021-10-09 ENCOUNTER — OFFICE VISIT (OUTPATIENT)
Dept: FAMILY MEDICINE CLINIC | Facility: CLINIC | Age: 61
End: 2021-10-09
Payer: COMMERCIAL

## 2021-10-09 VITALS
SYSTOLIC BLOOD PRESSURE: 148 MMHG | TEMPERATURE: 97 F | HEART RATE: 59 BPM | RESPIRATION RATE: 16 BRPM | DIASTOLIC BLOOD PRESSURE: 70 MMHG | HEIGHT: 72 IN | OXYGEN SATURATION: 96 % | BODY MASS INDEX: 31.83 KG/M2 | WEIGHT: 235 LBS

## 2021-10-09 DIAGNOSIS — I10 ESSENTIAL HYPERTENSION: ICD-10-CM

## 2021-10-09 DIAGNOSIS — Z23 NEED FOR VACCINATION: ICD-10-CM

## 2021-10-09 DIAGNOSIS — Z00.00 ENCOUNTER FOR ANNUAL PHYSICAL EXAM: Primary | ICD-10-CM

## 2021-10-09 DIAGNOSIS — E03.9 HYPOTHYROIDISM, UNSPECIFIED TYPE: ICD-10-CM

## 2021-10-09 DIAGNOSIS — E11.65 TYPE 2 DIABETES MELLITUS WITH HYPERGLYCEMIA, WITHOUT LONG-TERM CURRENT USE OF INSULIN (HCC): ICD-10-CM

## 2021-10-09 DIAGNOSIS — E78.2 MIXED HYPERLIPIDEMIA: ICD-10-CM

## 2021-10-09 DIAGNOSIS — E03.9 ACQUIRED HYPOTHYROIDISM: ICD-10-CM

## 2021-10-09 PROCEDURE — 90471 IMMUNIZATION ADMIN: CPT | Performed by: FAMILY MEDICINE

## 2021-10-09 PROCEDURE — 3008F BODY MASS INDEX DOCD: CPT | Performed by: FAMILY MEDICINE

## 2021-10-09 PROCEDURE — 99396 PREV VISIT EST AGE 40-64: CPT | Performed by: FAMILY MEDICINE

## 2021-10-09 PROCEDURE — 3078F DIAST BP <80 MM HG: CPT | Performed by: FAMILY MEDICINE

## 2021-10-09 PROCEDURE — 3077F SYST BP >= 140 MM HG: CPT | Performed by: FAMILY MEDICINE

## 2021-10-09 PROCEDURE — 90686 IIV4 VACC NO PRSV 0.5 ML IM: CPT | Performed by: FAMILY MEDICINE

## 2021-10-09 PROCEDURE — 99214 OFFICE O/P EST MOD 30 MIN: CPT | Performed by: FAMILY MEDICINE

## 2021-10-09 RX ORDER — ATORVASTATIN CALCIUM 40 MG/1
40 TABLET, FILM COATED ORAL NIGHTLY
Qty: 90 TABLET | Refills: 2 | Status: SHIPPED | OUTPATIENT
Start: 2021-10-09

## 2021-10-09 RX ORDER — AMLODIPINE BESYLATE 5 MG/1
5 TABLET ORAL DAILY
Qty: 90 TABLET | Refills: 2 | Status: SHIPPED | OUTPATIENT
Start: 2021-10-09

## 2021-10-09 RX ORDER — METFORMIN HYDROCHLORIDE 500 MG/1
500 TABLET, EXTENDED RELEASE ORAL DAILY
Qty: 90 TABLET | Refills: 3 | Status: SHIPPED | OUTPATIENT
Start: 2021-10-09

## 2021-10-09 RX ORDER — LEVOTHYROXINE SODIUM 0.03 MG/1
25 TABLET ORAL
Qty: 30 TABLET | Refills: 3 | Status: SHIPPED | OUTPATIENT
Start: 2021-10-09

## 2021-10-09 RX ORDER — LOSARTAN POTASSIUM 100 MG/1
100 TABLET ORAL DAILY
Qty: 90 TABLET | Refills: 2 | Status: SHIPPED | OUTPATIENT
Start: 2021-10-09

## 2021-10-09 NOTE — PROGRESS NOTES
Your hemoglobin A1c is better than before at 6.3Continue with the Metformin once a dayWe will recheck in 3 months

## 2021-10-09 NOTE — PROGRESS NOTES
/70   Pulse 59   Temp 96.6 °F (35.9 °C) (Temporal)   Resp 16   Ht 6' (1.829 m)   Wt 235 lb (106.6 kg)   SpO2 96%   BMI 31.87 kg/m²  Body mass index is 31.87 kg/m².      Patient presents with:  Physical      Veronica Esther is a 59year old male who (PROSTATE ENZYME FORMULA OR) Take 1 tablet by mouth daily. Super beta prostate     • FLUoxetine HCl 20 MG Oral Cap Take 1 capsule (20 mg total) by mouth daily.  30 capsule 1   • ClonazePAM 0.5 MG Oral Tab Take 1 tablet (0.5 mg total) by mouth nightly as nee granfather, live GB panc   • Heart Attack Other         fam hx   • Depression Other         fam hx   • Alcohol and Other Disorders Associated Other         fam hx   • High Cholesterol Other         fam hx   • Stroke Father    • Cancer Maternal Grandfather palpitations  GI: denies nausea, vomiting, constipation, diarrhea; no rectal bleeding; no heartburn  :  (Male):denies nocturia or changes in stream  MUSCULOSKELETAL: no joint complaints upper or lower extremities  NEURO: no sensory or motor complaint TSH+FREE T4; Future  -     levothyroxine 25 MCG Oral Tab;  Take 1 tablet (25 mcg total) by mouth before breakfast.    Mixed hyperlipidemia  -Elevated triglycerides  Continue Lipitor  And correcting his thyroid function  Started the patient on levothyroxine Dietary measures discussed include diet about 1800 calories - Excercise regimen also reviewed as well as long term benefits on overall health.    Recommend at least 30 minutes a day 3-4 times a week of aerobic activity such as brisk walking, cycling, aerobi

## 2021-10-27 ENCOUNTER — TELEPHONE (OUTPATIENT)
Dept: FAMILY MEDICINE CLINIC | Facility: CLINIC | Age: 61
End: 2021-10-27

## 2021-10-27 ENCOUNTER — MED REC SCAN ONLY (OUTPATIENT)
Dept: FAMILY MEDICINE CLINIC | Facility: CLINIC | Age: 61
End: 2021-10-27

## 2021-12-23 ENCOUNTER — HOSPITAL ENCOUNTER (EMERGENCY)
Facility: HOSPITAL | Age: 61
Discharge: HOME | End: 2021-12-23
Attending: EMERGENCY MEDICINE
Payer: COMMERCIAL

## 2021-12-23 ENCOUNTER — APPOINTMENT (OUTPATIENT)
Dept: GENERAL RADIOLOGY | Facility: HOSPITAL | Age: 61
End: 2021-12-23
Payer: COMMERCIAL

## 2021-12-23 VITALS
OXYGEN SATURATION: 95 % | TEMPERATURE: 98 F | DIASTOLIC BLOOD PRESSURE: 99 MMHG | RESPIRATION RATE: 16 BRPM | WEIGHT: 231 LBS | HEIGHT: 72 IN | SYSTOLIC BLOOD PRESSURE: 166 MMHG | HEART RATE: 90 BPM | BODY MASS INDEX: 31.29 KG/M2

## 2021-12-23 DIAGNOSIS — I48.91 ATRIAL FIBRILLATION WITH CONTROLLED VENTRICULAR RESPONSE (HCC): Primary | ICD-10-CM

## 2021-12-23 PROCEDURE — 36415 COLL VENOUS BLD VENIPUNCTURE: CPT

## 2021-12-23 PROCEDURE — 84443 ASSAY THYROID STIM HORMONE: CPT | Performed by: EMERGENCY MEDICINE

## 2021-12-23 PROCEDURE — 84484 ASSAY OF TROPONIN QUANT: CPT | Performed by: EMERGENCY MEDICINE

## 2021-12-23 PROCEDURE — 80053 COMPREHEN METABOLIC PANEL: CPT | Performed by: EMERGENCY MEDICINE

## 2021-12-23 PROCEDURE — 99285 EMERGENCY DEPT VISIT HI MDM: CPT

## 2021-12-23 PROCEDURE — 71045 X-RAY EXAM CHEST 1 VIEW: CPT | Performed by: EMERGENCY MEDICINE

## 2021-12-23 PROCEDURE — 93005 ELECTROCARDIOGRAM TRACING: CPT

## 2021-12-23 PROCEDURE — 93010 ELECTROCARDIOGRAM REPORT: CPT

## 2021-12-23 PROCEDURE — 85025 COMPLETE CBC W/AUTO DIFF WBC: CPT | Performed by: EMERGENCY MEDICINE

## 2021-12-23 PROCEDURE — 99284 EMERGENCY DEPT VISIT MOD MDM: CPT

## 2021-12-23 RX ORDER — DILTIAZEM HYDROCHLORIDE 120 MG/1
120 CAPSULE, EXTENDED RELEASE ORAL DAILY
Status: DISCONTINUED | OUTPATIENT
Start: 2021-12-23 | End: 2021-12-23

## 2021-12-23 RX ORDER — DILTIAZEM HYDROCHLORIDE 120 MG/1
120 CAPSULE, EXTENDED RELEASE ORAL DAILY
Qty: 30 CAPSULE | Refills: 0 | Status: SHIPPED | OUTPATIENT
Start: 2021-12-23 | End: 2021-12-23

## 2021-12-23 NOTE — CONSULTS
Edanibal/Henok Report of Consultation      Kasidora Cortez Patient Status:  Emergency    1960 MRN [id number]   Location [address] Attending No att. providers found   [address] Day # 0 PCP Jamey Bishop MD     Reason for disease)     stent   • Carotid stenosis    • Coronary atherosclerosis of native coronary artery    • Decorative tattoo    • Disorder of prostate Physical   • Easy bruising Since on blood thinner   • Esophageal reflux    • Essential hypertension, benign 90, temperature 98.4 °F (36.9 °C), temperature source Temporal, resp. rate 16, height 72\", weight 231 lb (104.8 kg), SpO2 95 %.   Temp (24hrs), Av.4 °F (36.9 °C), Min:98.4 °F (36.9 °C), Max:98.4 °F (36.9 °C)    Wt Readings from Last 3 Encounters:

## 2021-12-23 NOTE — ED INITIAL ASSESSMENT (HPI)
Pt reports heart flutter this morning, hx of afib, on eliquis, believes he may be in afib again. Apple watch showed afib. No pain.  Feels shaky

## 2021-12-23 NOTE — ED PROVIDER NOTES
Patient Seen in: [facility] Emergency Department      History   Patient presents with:  Arrythmia/Palpitations    Stated Complaint: A Fib    Subjective:   HPI    Hx of Afib-patient has a history of coronary artery disease carotid stenosis ADD sleep lower urinary tract symptoms (LUTS)    • Impaired fasting glucose    • Nocturia    • TAO (obstructive sleep apnea) 5/2/2021 PSG    AHI 17.9 REM AHI 22.7 Supine AHI 71.5 non-supine AHI 13.7 Sao2 Brad 84.9%    • Pure hypercholesterolemia    • Reflux esophag Left Ear: External ear normal.      Nose: Nose normal.   Eyes:      General: No scleral icterus. Right eye: No discharge. Left eye: No discharge.       Conjunctiva/sclera: Conjunctivae normal.      Pupils: Pupils are equal, round, and reactiv ---------                               -----------         ------                     CBC W/ DIFFERENTIAL[[id number]]                              Final result                 Please view results for these tests on the individual orders.    PARAG came to ER- stable for discharge home                         Disposition and Plan     Clinical Impression:  Atrial fibrillation with controlled ventricular response ([facility] [address]  (primary encounter diagnosis)     Disposition:  Discharge  12/23/2021  1:58 pm    F

## 2022-02-14 RX ORDER — LEVOTHYROXINE SODIUM 0.03 MG/1
TABLET ORAL
Qty: 90 TABLET | Refills: 0 | Status: SHIPPED | OUTPATIENT
Start: 2022-02-14

## 2022-03-14 ENCOUNTER — APPOINTMENT (OUTPATIENT)
Dept: CARDIOLOGY | Age: 62
End: 2022-03-14

## 2022-03-22 ENCOUNTER — PATIENT MESSAGE (OUTPATIENT)
Dept: INTERNAL MEDICINE CLINIC | Facility: CLINIC | Age: 62
End: 2022-03-22

## 2022-03-22 NOTE — TELEPHONE ENCOUNTER
From: Savanna Bowman  To: JOSH Vaughn  Sent: 3/22/2022 8:37 AM CDT  Subject: Refill    Hello, I have been trying to get my refill on omega-3-acid ethyl esters 1 g Caps for over 2.5 weeks and Rebeca says its still waiting for approval. Dr. Lisbet Mckeon used to respond within a day or two. Can you assist please?     Sadaf Mars

## 2022-03-23 ENCOUNTER — PATIENT MESSAGE (OUTPATIENT)
Dept: FAMILY MEDICINE CLINIC | Facility: CLINIC | Age: 62
End: 2022-03-23

## 2022-03-23 RX ORDER — OMEGA-3-ACID ETHYL ESTERS 1 G/1
2 CAPSULE, LIQUID FILLED ORAL 2 TIMES DAILY
Qty: 360 CAPSULE | Refills: 1 | Status: SHIPPED | OUTPATIENT
Start: 2022-03-23

## 2022-03-23 NOTE — TELEPHONE ENCOUNTER
From: Ellen Bueno  To: Carley Payne MD  Sent: 3/23/2022 7:04 AM CDT  Subject: Refill    Hello, I have been trying to get my refill on omega-3-acid ethyl esters 1 g Caps for over 2.5 weeks and Rebeca says its still waiting for approval. Dr. Mehul Concepcion used to respond within a day or two. Can you assist please? Is there anything else I need to do?      Abena Christensen

## 2022-03-30 ENCOUNTER — PATIENT MESSAGE (OUTPATIENT)
Dept: FAMILY MEDICINE CLINIC | Facility: CLINIC | Age: 62
End: 2022-03-30

## 2022-03-30 NOTE — TELEPHONE ENCOUNTER
From: Sandy Zheng  To: Aisha Patton MD  Sent: 3/30/2022 8:51 AM CDT  Subject: 2nd Booster    [facility] Doctor Denise Calhoun,    With the latest coming out from the CDC should be getting the 2nd booster @ 59years old?     Thank you,  Chelsea Ann

## 2022-05-14 RX ORDER — LEVOTHYROXINE SODIUM 0.03 MG/1
TABLET ORAL
Qty: 90 TABLET | Refills: 0 | Status: SHIPPED | OUTPATIENT
Start: 2022-05-14

## 2022-05-14 NOTE — TELEPHONE ENCOUNTER
Thyroid Supplements Protocol Passed 05/14/2022 09:42 AM   Protocol Details  TSH test in past 12 months    TSH value between 0.350 and 5.500 IU/ml    Appointment in past 12 or next 3 months     LOV 10/9/21     LAST LAB   12/23/21    LAST RX  2/14/22 90 tabs     Next OV No future appointments.       PROTOCOL pass

## 2022-05-15 ENCOUNTER — PATIENT MESSAGE (OUTPATIENT)
Dept: FAMILY MEDICINE CLINIC | Facility: CLINIC | Age: 62
End: 2022-05-15

## 2022-05-16 NOTE — TELEPHONE ENCOUNTER
From: Savanna Bowman  To: Zoë Borrego MD  Sent: 5/15/2022 11:45 AM CDT  [facility] Can you please put on my medical record that I received the second booster [facility] May 12th 2022. Thank you!

## 2022-06-30 ENCOUNTER — HOSPITAL ENCOUNTER (OUTPATIENT)
Age: 62
Discharge: HOME | End: 2022-06-30
Payer: COMMERCIAL

## 2022-06-30 VITALS
WEIGHT: 231.06 LBS | BODY MASS INDEX: 31 KG/M2 | SYSTOLIC BLOOD PRESSURE: 164 MMHG | OXYGEN SATURATION: 97 % | DIASTOLIC BLOOD PRESSURE: 68 MMHG | TEMPERATURE: 99 F | RESPIRATION RATE: 16 BRPM | HEART RATE: 45 BPM

## 2022-06-30 DIAGNOSIS — U07.1 COVID-19 VIRUS INFECTION: Primary | ICD-10-CM

## 2022-06-30 LAB — SARS-COV-2 RNA RESP QL NAA+PROBE: DETECTED

## 2022-06-30 PROCEDURE — 99204 OFFICE O/P NEW MOD 45 MIN: CPT | Performed by: NURSE PRACTITIONER

## 2022-06-30 PROCEDURE — M0222 INTRAVENOUS INJECTION, BEBTELOVIMAB, INCLUDES INJECTION AND POST ADMINISTRATIVE MONITORING: HCPCS | Performed by: NURSE PRACTITIONER

## 2022-06-30 PROCEDURE — U0002 COVID-19 LAB TEST NON-CDC: HCPCS | Performed by: NURSE PRACTITIONER

## 2022-06-30 RX ORDER — BENZONATATE 100 MG/1
100 CAPSULE ORAL 3 TIMES DAILY PRN
Qty: 30 CAPSULE | Refills: 0 | Status: SHIPPED | OUTPATIENT
Start: 2022-06-30 | End: 2022-07-30

## 2022-06-30 RX ORDER — ALBUTEROL SULFATE 90 UG/1
2 AEROSOL, METERED RESPIRATORY (INHALATION) EVERY 4 HOURS PRN
Qty: 1 EACH | Refills: 0 | Status: SHIPPED | OUTPATIENT
Start: 2022-06-30 | End: 2022-07-30

## 2022-06-30 RX ORDER — BEBTELOVIMAB 87.5 MG/ML
175 INJECTION, SOLUTION INTRAVENOUS ONCE
Status: COMPLETED | OUTPATIENT
Start: 2022-06-30 | End: 2022-06-30

## 2022-07-01 ENCOUNTER — TELEPHONE (OUTPATIENT)
Dept: CASE MANAGEMENT | Age: 62
End: 2022-07-01

## 2022-07-01 NOTE — TELEPHONE ENCOUNTER
URMILA and reviewed symptomatic treatment. Advised we will call again on Tuesday for condition update. Instructed if any breathing problem, SOB, CP/tightness, go back to IC for evaluation.

## 2022-07-01 NOTE — TELEPHONE ENCOUNTER
Pt received MAB infusion at [facility] on 6/30/ 22 for COVID-19. Please follow-up with pt for post-infusion assessment and home monitoring if needed. Thank you.

## 2022-07-05 ENCOUNTER — TELEPHONE (OUTPATIENT)
Dept: FAMILY MEDICINE CLINIC | Facility: CLINIC | Age: 62
End: 2022-07-05

## 2022-07-05 NOTE — TELEPHONE ENCOUNTER
[facility] requesting return call with symptom detail. Reviewed symptomatic treatment for cough with fluids, warm tea w honey/lemon, steam tx, delsym cough med, OTC honey or menthol throat lozenges. Advised no need for further covid testing. Instructed to call for Video Visit is cough is not improving. No future appointments.

## 2022-07-05 NOTE — TELEPHONE ENCOUNTER
Pt called stating he tested Covid positive 6-30-22. Feeling better but cough is still bad. Should he take another PCR test.  He said he does not trust the box tests because he took one at home last week which came back negative. He said he knew he had to be positive because  He felt horrible.     Should he get another PCR test?

## 2022-07-05 NOTE — TELEPHONE ENCOUNTER
ML requesting return call with symptom/condition update. Advised once he is fully recovered from covid, have outstanding fasting lab work done and schedule medication follow up with Dr. Fausto Carnes.

## 2022-07-07 ENCOUNTER — TELEMEDICINE (OUTPATIENT)
Dept: FAMILY MEDICINE CLINIC | Facility: CLINIC | Age: 62
End: 2022-07-07

## 2022-07-07 ENCOUNTER — PATIENT MESSAGE (OUTPATIENT)
Dept: FAMILY MEDICINE CLINIC | Facility: CLINIC | Age: 62
End: 2022-07-07

## 2022-07-07 DIAGNOSIS — U07.1 COVID-19: Primary | ICD-10-CM

## 2022-07-07 PROCEDURE — 99213 OFFICE O/P EST LOW 20 MIN: CPT | Performed by: FAMILY MEDICINE

## 2022-07-07 NOTE — TELEPHONE ENCOUNTER
Patient returned call. Agrees to Video Visit with Dr. Kiel Lopez to discuss ongoing symptoms post covid and MAB infusion. States he will unblock phone numbers so call will go through for visit.     Future Appointments   Date Time Provider Steffi Karen   7/7/2022  3:45 PM Genevieve Klinefelter, MD [facility]

## 2022-07-07 NOTE — TELEPHONE ENCOUNTER
From: Elvis Quick  To: Donal Lin MD  Sent: 7/7/2022 8:05 AM CDT  Subject: Positive Covid Test    Good Morning,  I am looking for guidance on next steps after I have tested positive for Covid on Jun 30th. I received the monoclonal IV during my initial test [facility]. Will I need another treatment? Also when should I test again? I have little lia in the \"at home tests\" as I used them twice with negative results. I found out I was positive after feeling much worse on 6/30 and visiting the [facility]. I still feeling very fatigued and cloudy headed and this concerns me.  Any guidance much appreciated

## 2022-08-17 ENCOUNTER — TELEPHONE (OUTPATIENT)
Dept: FAMILY MEDICINE CLINIC | Facility: CLINIC | Age: 62
End: 2022-08-17

## 2022-08-17 DIAGNOSIS — I10 ESSENTIAL HYPERTENSION: ICD-10-CM

## 2022-08-17 RX ORDER — AMLODIPINE BESYLATE 5 MG/1
5 TABLET ORAL DAILY
Qty: 30 TABLET | Refills: 1 | Status: SHIPPED | OUTPATIENT
Start: 2022-08-17

## 2022-08-17 RX ORDER — AMLODIPINE BESYLATE 5 MG/1
TABLET ORAL
Qty: 90 TABLET | Refills: 0 | OUTPATIENT
Start: 2022-08-17

## 2022-09-13 ENCOUNTER — PATIENT MESSAGE (OUTPATIENT)
Dept: FAMILY MEDICINE CLINIC | Facility: CLINIC | Age: 62
End: 2022-09-13

## 2022-09-13 DIAGNOSIS — Z00.00 ROUTINE GENERAL MEDICAL EXAMINATION AT A HEALTH CARE FACILITY: ICD-10-CM

## 2022-09-13 DIAGNOSIS — E03.9 HYPOTHYROIDISM, UNSPECIFIED TYPE: ICD-10-CM

## 2022-09-13 DIAGNOSIS — E11.65 TYPE 2 DIABETES MELLITUS WITH HYPERGLYCEMIA, WITHOUT LONG-TERM CURRENT USE OF INSULIN (HCC): Primary | ICD-10-CM

## 2022-09-13 RX ORDER — LEVOTHYROXINE SODIUM 0.03 MG/1
25 TABLET ORAL
Qty: 90 TABLET | Refills: 0 | Status: SHIPPED | OUTPATIENT
Start: 2022-09-13

## 2022-09-13 NOTE — TELEPHONE ENCOUNTER
From: Ivet Nelson  To: Jesusita Fulton MD  Sent: 9/13/2022 12:50 PM CDT  Subject: Blood work Before Oct 8th Physical    Hello Doctor,    I was wondering if I should get my standard bloodwork done prior to my Oct 8th 11:15 AM Physical. Can this be ordered? Thank you.

## 2022-09-13 NOTE — TELEPHONE ENCOUNTER
Thyroid Supplements Protocol Passed 09/13/2022 09:00 AM   Protocol Details  TSH test in past 12 months    TSH value between 0.350 and 5.500 IU/ml    Appointment in past 12 or next 3 months     LOV 10/9/21      LAST LAB   12/23/21      LAST RX 5/14/22 90     Next OV   Future Appointments   Date Time Provider Steffi Jones   10/8/2022 11:15 AM Campos Hendrickson MD [facility]         PROTOCOL pass

## 2022-09-30 ENCOUNTER — LAB ENCOUNTER (OUTPATIENT)
Dept: LAB | Facility: HOSPITAL | Age: 62
End: 2022-09-30
Attending: FAMILY MEDICINE
Payer: COMMERCIAL

## 2022-09-30 ENCOUNTER — TELEPHONE (OUTPATIENT)
Dept: FAMILY MEDICINE CLINIC | Facility: CLINIC | Age: 62
End: 2022-09-30

## 2022-09-30 DIAGNOSIS — E78.2 MIXED HYPERLIPIDEMIA: ICD-10-CM

## 2022-09-30 DIAGNOSIS — Z00.00 ROUTINE GENERAL MEDICAL EXAMINATION AT A HEALTH CARE FACILITY: ICD-10-CM

## 2022-09-30 DIAGNOSIS — E03.9 HYPOTHYROIDISM, UNSPECIFIED TYPE: ICD-10-CM

## 2022-09-30 DIAGNOSIS — E11.65 TYPE 2 DIABETES MELLITUS WITH HYPERGLYCEMIA, WITHOUT LONG-TERM CURRENT USE OF INSULIN (HCC): ICD-10-CM

## 2022-09-30 LAB
ALBUMIN SERPL-MCNC: 3.8 G/DL (ref 3.4–5)
ALBUMIN/GLOB SERPL: 1.3 {RATIO} (ref 1–2)
ALP LIVER SERPL-CCNC: 65 U/L
ALT SERPL-CCNC: 25 U/L
ANION GAP SERPL CALC-SCNC: 4 MMOL/L (ref 0–18)
AST SERPL-CCNC: 18 U/L (ref 15–37)
BASOPHILS # BLD AUTO: 0.05 X10(3) UL (ref 0–0.2)
BASOPHILS NFR BLD AUTO: 0.6 %
BILIRUB SERPL-MCNC: 0.6 MG/DL (ref 0.1–2)
BUN BLD-MCNC: 20 MG/DL (ref 7–18)
CALCIUM BLD-MCNC: 9.6 MG/DL (ref 8.5–10.1)
CHLORIDE SERPL-SCNC: 104 MMOL/L (ref 98–112)
CHOLEST SERPL-MCNC: 133 MG/DL (ref ?–200)
CO2 SERPL-SCNC: 29 MMOL/L (ref 21–32)
COMPLEXED PSA SERPL-MCNC: 0.41 NG/ML (ref ?–4)
CREAT BLD-MCNC: 1.15 MG/DL
EOSINOPHIL # BLD AUTO: 0.27 X10(3) UL (ref 0–0.7)
EOSINOPHIL NFR BLD AUTO: 3.5 %
ERYTHROCYTE [DISTWIDTH] IN BLOOD BY AUTOMATED COUNT: 12.4 %
EST. AVERAGE GLUCOSE BLD GHB EST-MCNC: 137 MG/DL (ref 68–126)
FASTING PATIENT LIPID ANSWER: YES
FASTING STATUS PATIENT QL REPORTED: YES
GFR SERPLBLD BASED ON 1.73 SQ M-ARVRAT: 71 ML/MIN/1.73M2 (ref 60–?)
GLOBULIN PLAS-MCNC: 3 G/DL (ref 2.8–4.4)
GLUCOSE BLD-MCNC: 131 MG/DL (ref 70–99)
HBA1C MFR BLD: 6.4 % (ref ?–5.7)
HCT VFR BLD AUTO: 40.2 %
HDLC SERPL-MCNC: 35 MG/DL (ref 40–59)
HGB BLD-MCNC: 13.3 G/DL
IMM GRANULOCYTES # BLD AUTO: 0.03 X10(3) UL (ref 0–1)
IMM GRANULOCYTES NFR BLD: 0.4 %
LDLC SERPL CALC-MCNC: 51 MG/DL (ref ?–100)
LYMPHOCYTES # BLD AUTO: 2.41 X10(3) UL (ref 1–4)
LYMPHOCYTES NFR BLD AUTO: 31.2 %
MCH RBC QN AUTO: 28.9 PG (ref 26–34)
MCHC RBC AUTO-ENTMCNC: 33.1 G/DL (ref 31–37)
MCV RBC AUTO: 87.2 FL
MONOCYTES # BLD AUTO: 0.6 X10(3) UL (ref 0.1–1)
MONOCYTES NFR BLD AUTO: 7.8 %
NEUTROPHILS # BLD AUTO: 4.37 X10 (3) UL (ref 1.5–7.7)
NEUTROPHILS # BLD AUTO: 4.37 X10(3) UL (ref 1.5–7.7)
NEUTROPHILS NFR BLD AUTO: 56.5 %
NONHDLC SERPL-MCNC: 98 MG/DL (ref ?–130)
OSMOLALITY SERPL CALC.SUM OF ELEC: 288 MOSM/KG (ref 275–295)
PLATELET # BLD AUTO: 235 10(3)UL (ref 150–450)
POTASSIUM SERPL-SCNC: 4.5 MMOL/L (ref 3.5–5.1)
PROT SERPL-MCNC: 6.8 G/DL (ref 6.4–8.2)
RBC # BLD AUTO: 4.61 X10(6)UL
SODIUM SERPL-SCNC: 137 MMOL/L (ref 136–145)
T4 FREE SERPL-MCNC: 0.8 NG/DL (ref 0.8–1.7)
TRIGL SERPL-MCNC: 308 MG/DL (ref 30–149)
TSI SER-ACNC: 3.54 MIU/ML (ref 0.36–3.74)
VLDLC SERPL CALC-MCNC: 44 MG/DL (ref 0–30)
WBC # BLD AUTO: 7.7 X10(3) UL (ref 4–11)

## 2022-09-30 PROCEDURE — 85025 COMPLETE CBC W/AUTO DIFF WBC: CPT

## 2022-09-30 PROCEDURE — 3044F HG A1C LEVEL LT 7.0%: CPT | Performed by: FAMILY MEDICINE

## 2022-09-30 PROCEDURE — 80061 LIPID PANEL: CPT

## 2022-09-30 PROCEDURE — 83036 HEMOGLOBIN GLYCOSYLATED A1C: CPT

## 2022-09-30 PROCEDURE — 36415 COLL VENOUS BLD VENIPUNCTURE: CPT

## 2022-09-30 PROCEDURE — 80053 COMPREHEN METABOLIC PANEL: CPT

## 2022-09-30 PROCEDURE — 84443 ASSAY THYROID STIM HORMONE: CPT

## 2022-09-30 PROCEDURE — 84439 ASSAY OF FREE THYROXINE: CPT

## 2022-09-30 NOTE — TELEPHONE ENCOUNTER
Medical Record Request Received    Date received in office: 8-29-22    Requested from: Gucci Brownlee [address]     Records to be sent to:  Levi Lopez [phone]         Date request sent to [employer]:9-30-22

## 2022-10-03 NOTE — PROGRESS NOTES
Hemoglobin A1c is stable at 6.4 compared to last year, continue your diet and exercise as well as metforminYour lipid profile shows normal cholesterol and LDL unfortunately your triglycerides are high at 308, it is better than last year.   No change in medications continue your diet and exerciseWe will discuss further at the time of your visit on October 8

## 2022-10-06 DIAGNOSIS — I10 ESSENTIAL HYPERTENSION: ICD-10-CM

## 2022-10-06 RX ORDER — AMLODIPINE BESYLATE 5 MG/1
TABLET ORAL
Qty: 90 TABLET | Refills: 0 | Status: SHIPPED | OUTPATIENT
Start: 2022-10-06

## 2022-10-06 NOTE — TELEPHONE ENCOUNTER
Hypertension Medications Protocol Passed 10/06/2022 01:29 PM   Protocol Details  CMP or BMP in past 12 months    Last serum creatinine< 2.0    Appointment in past 6 or next 3 months     LOV 10/9/2021    LAST LAB  9/30/22    LAST RX  8/17/22 30 with 1     Next OV   Future Appointments   Date Time Provider Steffi Jones   10/8/2022 11:15 AM Bhavana Chapman MD [facility]         PROTOCOL pass

## 2022-10-08 ENCOUNTER — OFFICE VISIT (OUTPATIENT)
Dept: FAMILY MEDICINE CLINIC | Facility: CLINIC | Age: 62
End: 2022-10-08
Payer: COMMERCIAL

## 2022-10-08 VITALS
BODY MASS INDEX: 33.32 KG/M2 | DIASTOLIC BLOOD PRESSURE: 76 MMHG | RESPIRATION RATE: 16 BRPM | WEIGHT: 246 LBS | HEART RATE: 52 BPM | SYSTOLIC BLOOD PRESSURE: 132 MMHG | HEIGHT: 72 IN | OXYGEN SATURATION: 98 %

## 2022-10-08 DIAGNOSIS — Z00.00 ENCOUNTER FOR ANNUAL PHYSICAL EXAM: Primary | ICD-10-CM

## 2022-10-08 DIAGNOSIS — E11.65 TYPE 2 DIABETES MELLITUS WITH HYPERGLYCEMIA, WITHOUT LONG-TERM CURRENT USE OF INSULIN (HCC): ICD-10-CM

## 2022-10-08 DIAGNOSIS — Z23 NEED FOR VACCINATION: ICD-10-CM

## 2022-10-08 PROCEDURE — 3078F DIAST BP <80 MM HG: CPT | Performed by: FAMILY MEDICINE

## 2022-10-08 PROCEDURE — 90471 IMMUNIZATION ADMIN: CPT | Performed by: FAMILY MEDICINE

## 2022-10-08 PROCEDURE — 3008F BODY MASS INDEX DOCD: CPT | Performed by: FAMILY MEDICINE

## 2022-10-08 PROCEDURE — 99396 PREV VISIT EST AGE 40-64: CPT | Performed by: FAMILY MEDICINE

## 2022-10-08 PROCEDURE — 90686 IIV4 VACC NO PRSV 0.5 ML IM: CPT | Performed by: FAMILY MEDICINE

## 2022-10-08 PROCEDURE — 3075F SYST BP GE 130 - 139MM HG: CPT | Performed by: FAMILY MEDICINE

## 2022-12-04 DIAGNOSIS — E03.9 HYPOTHYROIDISM, UNSPECIFIED TYPE: ICD-10-CM

## 2022-12-04 DIAGNOSIS — E78.2 MIXED HYPERLIPIDEMIA: ICD-10-CM

## 2022-12-04 DIAGNOSIS — I25.10 ATHEROSCLEROSIS OF NATIVE CORONARY ARTERY OF NATIVE HEART WITHOUT ANGINA PECTORIS: ICD-10-CM

## 2022-12-04 DIAGNOSIS — I10 ESSENTIAL HYPERTENSION: ICD-10-CM

## 2022-12-04 DIAGNOSIS — E11.65 TYPE 2 DIABETES MELLITUS WITH HYPERGLYCEMIA, WITHOUT LONG-TERM CURRENT USE OF INSULIN (HCC): ICD-10-CM

## 2022-12-05 RX ORDER — LEVOTHYROXINE SODIUM 0.03 MG/1
TABLET ORAL
Qty: 90 TABLET | Refills: 0 | Status: SHIPPED | OUTPATIENT
Start: 2022-12-05

## 2022-12-05 RX ORDER — AMLODIPINE BESYLATE 5 MG/1
TABLET ORAL
Qty: 90 TABLET | Refills: 0 | Status: SHIPPED | OUTPATIENT
Start: 2022-12-05

## 2022-12-05 RX ORDER — OMEGA-3-ACID ETHYL ESTERS 1 G/1
CAPSULE, LIQUID FILLED ORAL
Qty: 360 CAPSULE | Refills: 0 | Status: SHIPPED | OUTPATIENT
Start: 2022-12-05

## 2022-12-05 RX ORDER — METFORMIN HYDROCHLORIDE 500 MG/1
TABLET, EXTENDED RELEASE ORAL
Qty: 90 TABLET | Refills: 0 | Status: SHIPPED | OUTPATIENT
Start: 2022-12-05

## 2022-12-05 NOTE — TELEPHONE ENCOUNTER
LOV 10/8/2022      LAST LAB 9/30/22    LAST RX     Next OV No future appointments.       PROTOCOL pass

## 2023-02-27 ENCOUNTER — TELEPHONE (OUTPATIENT)
Dept: FAMILY MEDICINE CLINIC | Facility: CLINIC | Age: 63
End: 2023-02-27

## 2023-02-27 ENCOUNTER — MED REC SCAN ONLY (OUTPATIENT)
Dept: FAMILY MEDICINE CLINIC | Facility: CLINIC | Age: 63
End: 2023-02-27

## 2023-02-27 ENCOUNTER — PATIENT MESSAGE (OUTPATIENT)
Dept: FAMILY MEDICINE CLINIC | Facility: CLINIC | Age: 63
End: 2023-02-27

## 2023-02-27 NOTE — TELEPHONE ENCOUNTER
From: Booker Oppenheim  To: Timur Nielsen MD  Sent: 2/27/2023 10:32 AM CST  Subject: Eye Dilation Results     Eye Dilation Results for 2/26/2023 (Attached)

## 2023-03-05 DIAGNOSIS — E11.65 TYPE 2 DIABETES MELLITUS WITH HYPERGLYCEMIA, WITHOUT LONG-TERM CURRENT USE OF INSULIN (HCC): ICD-10-CM

## 2023-03-06 DIAGNOSIS — E03.9 HYPOTHYROIDISM, UNSPECIFIED TYPE: ICD-10-CM

## 2023-03-06 RX ORDER — METFORMIN HYDROCHLORIDE 500 MG/1
TABLET, EXTENDED RELEASE ORAL
Qty: 90 TABLET | Refills: 0 | Status: SHIPPED | OUTPATIENT
Start: 2023-03-06

## 2023-03-06 RX ORDER — LEVOTHYROXINE SODIUM 0.03 MG/1
TABLET ORAL
Qty: 90 TABLET | Refills: 1 | Status: SHIPPED | OUTPATIENT
Start: 2023-03-06

## 2023-03-06 NOTE — TELEPHONE ENCOUNTER
Thyroid Supplements Protocol Passed 03/06/2023 09:30 AM   Protocol Details  TSH test in past 12 months    TSH value between 0.350 and 5.500 IU/ml    Appointment in past 12 or next 3 months        LOV  10/8/22     LAST LAB 9/30/22      LAST RX  12/5/22 90     Next OV No future appointments.     PROTOCOL pass

## 2023-03-16 DIAGNOSIS — I25.10 ATHEROSCLEROSIS OF NATIVE CORONARY ARTERY OF NATIVE HEART WITHOUT ANGINA PECTORIS: ICD-10-CM

## 2023-03-16 DIAGNOSIS — E78.2 MIXED HYPERLIPIDEMIA: ICD-10-CM

## 2023-03-16 RX ORDER — OMEGA-3-ACID ETHYL ESTERS 1 G/1
CAPSULE, LIQUID FILLED ORAL
Qty: 360 CAPSULE | Refills: 0 | Status: SHIPPED | OUTPATIENT
Start: 2023-03-16

## 2023-03-17 ENCOUNTER — TELEPHONE (OUTPATIENT)
Dept: FAMILY MEDICINE CLINIC | Facility: CLINIC | Age: 63
End: 2023-03-17

## 2023-04-19 ENCOUNTER — TELEPHONE (OUTPATIENT)
Dept: FAMILY MEDICINE CLINIC | Facility: CLINIC | Age: 63
End: 2023-04-19

## 2023-04-28 ENCOUNTER — TELEPHONE (OUTPATIENT)
Dept: FAMILY MEDICINE CLINIC | Facility: CLINIC | Age: 63
End: 2023-04-28

## 2023-04-28 ENCOUNTER — MED REC SCAN ONLY (OUTPATIENT)
Dept: FAMILY MEDICINE CLINIC | Facility: CLINIC | Age: 63
End: 2023-04-28

## 2023-05-10 ENCOUNTER — TELEPHONE (OUTPATIENT)
Dept: FAMILY MEDICINE CLINIC | Facility: CLINIC | Age: 63
End: 2023-05-10

## 2023-05-10 DIAGNOSIS — Z12.5 SCREENING FOR PROSTATE CANCER: ICD-10-CM

## 2023-05-10 DIAGNOSIS — Z00.00 ROUTINE GENERAL MEDICAL EXAMINATION AT A HEALTH CARE FACILITY: Primary | ICD-10-CM

## 2023-05-10 DIAGNOSIS — E11.65 TYPE 2 DIABETES MELLITUS WITH HYPERGLYCEMIA, WITHOUT LONG-TERM CURRENT USE OF INSULIN (HCC): ICD-10-CM

## 2023-05-10 NOTE — TELEPHONE ENCOUNTER
Pt requesting bloodwork before annual. Please advise     Future Appointments   Date Time Provider Steffi Karen   7/14/2023  8:20 AM Emory Calderon MD [facility]

## 2023-05-10 NOTE — TELEPHONE ENCOUNTER
Last annual physical 10/8/22  Dr. Rocio Tay pt (requested to switch to Dr. Asha Saxena 4/19/23)    Last labs 9/30/22    Physical scheduled:  Future Appointments   Date Time Provider Steffi Karen   7/14/2023  8:20 AM Samira Triplett MD [facility]       Routine fasting labs pended for your approval if ok. Please review and advise. Thank you.

## 2023-05-11 NOTE — TELEPHONE ENCOUNTER
Detailed VMML advising patient of fasting lab orders. Instructed per Dr. Friend Overall to wait 1-2 weeks prior to appt to get labwork done.

## 2023-05-16 DIAGNOSIS — I10 ESSENTIAL HYPERTENSION: ICD-10-CM

## 2023-05-17 RX ORDER — AMLODIPINE BESYLATE 5 MG/1
TABLET ORAL
Qty: 90 TABLET | Refills: 0 | Status: SHIPPED | OUTPATIENT
Start: 2023-05-17

## 2023-05-17 NOTE — TELEPHONE ENCOUNTER
LOV 10/8/2022      LAST LAB 9/30/22    LAST RX 12/5/22 90 tab    Next OV   Future Appointments   Date Time Provider Steffi Jones   7/14/2023  8:20 AM Esvin Roberson MD [facility]         PROTOCOL pass

## 2023-06-04 DIAGNOSIS — E11.65 TYPE 2 DIABETES MELLITUS WITH HYPERGLYCEMIA, WITHOUT LONG-TERM CURRENT USE OF INSULIN (HCC): ICD-10-CM

## 2023-06-05 NOTE — TELEPHONE ENCOUNTER
Diabetic Medication Protocol Failed 06/04/2023 11:04 AM   Protocol Details  HgBA1C procedure resulted in past 6 months    Last HgBA1C < 7.5    Microalbumin procedure in past 12 months or taking ACE/ARB    Appointment in past 6 or next 3 months        LOV 10/8/22     LAST LAB  9/30/22     LAST RX 3/6/23 90     Next OV   Future Appointments   Date Time Provider Steffi Jones   7/14/2023  8:20 AM Eliu Omalley MD [facility]          PROTOCOL failed       Due for labs and visit

## 2023-06-06 RX ORDER — METFORMIN HYDROCHLORIDE 500 MG/1
TABLET, EXTENDED RELEASE ORAL
Qty: 90 TABLET | Refills: 0 | Status: SHIPPED | OUTPATIENT
Start: 2023-06-06

## 2023-06-28 ENCOUNTER — MED REC SCAN ONLY (OUTPATIENT)
Dept: FAMILY MEDICINE CLINIC | Facility: CLINIC | Age: 63
End: 2023-06-28

## 2023-07-10 ENCOUNTER — LAB ENCOUNTER (OUTPATIENT)
Dept: LAB | Age: 63
End: 2023-07-10
Attending: FAMILY MEDICINE
Payer: COMMERCIAL

## 2023-07-10 DIAGNOSIS — Z00.00 ROUTINE GENERAL MEDICAL EXAMINATION AT A HEALTH CARE FACILITY: ICD-10-CM

## 2023-07-10 DIAGNOSIS — Z12.5 SCREENING FOR PROSTATE CANCER: ICD-10-CM

## 2023-07-10 DIAGNOSIS — E11.65 TYPE 2 DIABETES MELLITUS WITH HYPERGLYCEMIA, WITHOUT LONG-TERM CURRENT USE OF INSULIN (HCC): ICD-10-CM

## 2023-07-10 LAB
ALBUMIN SERPL-MCNC: 4 G/DL (ref 3.4–5)
ALBUMIN/GLOB SERPL: 1.3 {RATIO} (ref 1–2)
ALP LIVER SERPL-CCNC: 61 U/L
ALT SERPL-CCNC: 35 U/L
ANION GAP SERPL CALC-SCNC: 4 MMOL/L (ref 0–18)
AST SERPL-CCNC: 21 U/L (ref 15–37)
BASOPHILS # BLD AUTO: 0.05 X10(3) UL (ref 0–0.2)
BASOPHILS NFR BLD AUTO: 0.6 %
BILIRUB SERPL-MCNC: 0.5 MG/DL (ref 0.1–2)
BUN BLD-MCNC: 23 MG/DL (ref 7–18)
CALCIUM BLD-MCNC: 9.2 MG/DL (ref 8.5–10.1)
CHLORIDE SERPL-SCNC: 106 MMOL/L (ref 98–112)
CHOLEST SERPL-MCNC: 139 MG/DL (ref ?–200)
CO2 SERPL-SCNC: 26 MMOL/L (ref 21–32)
COMPLEXED PSA SERPL-MCNC: 0.37 NG/ML (ref ?–4)
CREAT BLD-MCNC: 1.24 MG/DL
CREAT UR-SCNC: 135 MG/DL
EOSINOPHIL # BLD AUTO: 0.29 X10(3) UL (ref 0–0.7)
EOSINOPHIL NFR BLD AUTO: 3.6 %
ERYTHROCYTE [DISTWIDTH] IN BLOOD BY AUTOMATED COUNT: 12.5 %
EST. AVERAGE GLUCOSE BLD GHB EST-MCNC: 146 MG/DL (ref 68–126)
FASTING PATIENT LIPID ANSWER: YES
FASTING STATUS PATIENT QL REPORTED: YES
GFR SERPLBLD BASED ON 1.73 SQ M-ARVRAT: 65 ML/MIN/1.73M2 (ref 60–?)
GLOBULIN PLAS-MCNC: 3 G/DL (ref 2.8–4.4)
GLUCOSE BLD-MCNC: 144 MG/DL (ref 70–99)
HBA1C MFR BLD: 6.7 % (ref ?–5.7)
HCT VFR BLD AUTO: 39.5 %
HDLC SERPL-MCNC: 36 MG/DL (ref 40–59)
HGB BLD-MCNC: 13.4 G/DL
IMM GRANULOCYTES # BLD AUTO: 0.02 X10(3) UL (ref 0–1)
IMM GRANULOCYTES NFR BLD: 0.2 %
LDLC SERPL CALC-MCNC: 56 MG/DL (ref ?–100)
LYMPHOCYTES # BLD AUTO: 2.76 X10(3) UL (ref 1–4)
LYMPHOCYTES NFR BLD AUTO: 34 %
MCH RBC QN AUTO: 29.3 PG (ref 26–34)
MCHC RBC AUTO-ENTMCNC: 33.9 G/DL (ref 31–37)
MCV RBC AUTO: 86.2 FL
MICROALBUMIN UR-MCNC: 1.12 MG/DL
MICROALBUMIN/CREAT 24H UR-RTO: 8.3 UG/MG (ref ?–30)
MONOCYTES # BLD AUTO: 0.67 X10(3) UL (ref 0.1–1)
MONOCYTES NFR BLD AUTO: 8.3 %
NEUTROPHILS # BLD AUTO: 4.32 X10 (3) UL (ref 1.5–7.7)
NEUTROPHILS # BLD AUTO: 4.32 X10(3) UL (ref 1.5–7.7)
NEUTROPHILS NFR BLD AUTO: 53.3 %
NONHDLC SERPL-MCNC: 103 MG/DL (ref ?–130)
OSMOLALITY SERPL CALC.SUM OF ELEC: 288 MOSM/KG (ref 275–295)
PLATELET # BLD AUTO: 224 10(3)UL (ref 150–450)
POTASSIUM SERPL-SCNC: 5.3 MMOL/L (ref 3.5–5.1)
PROT SERPL-MCNC: 7 G/DL (ref 6.4–8.2)
RBC # BLD AUTO: 4.58 X10(6)UL
SODIUM SERPL-SCNC: 136 MMOL/L (ref 136–145)
T4 FREE SERPL-MCNC: 0.7 NG/DL (ref 0.8–1.7)
TRIGL SERPL-MCNC: 298 MG/DL (ref 30–149)
TSI SER-ACNC: 4.19 MIU/ML (ref 0.36–3.74)
VLDLC SERPL CALC-MCNC: 44 MG/DL (ref 0–30)
WBC # BLD AUTO: 8.1 X10(3) UL (ref 4–11)

## 2023-07-10 PROCEDURE — 3061F NEG MICROALBUMINURIA REV: CPT | Performed by: FAMILY MEDICINE

## 2023-07-10 PROCEDURE — 82043 UR ALBUMIN QUANTITATIVE: CPT

## 2023-07-10 PROCEDURE — 82570 ASSAY OF URINE CREATININE: CPT

## 2023-07-10 PROCEDURE — 3044F HG A1C LEVEL LT 7.0%: CPT | Performed by: FAMILY MEDICINE

## 2023-07-10 PROCEDURE — 83036 HEMOGLOBIN GLYCOSYLATED A1C: CPT

## 2023-07-10 PROCEDURE — 80053 COMPREHEN METABOLIC PANEL: CPT

## 2023-07-10 PROCEDURE — 80061 LIPID PANEL: CPT

## 2023-07-10 PROCEDURE — 84443 ASSAY THYROID STIM HORMONE: CPT

## 2023-07-10 PROCEDURE — 84439 ASSAY OF FREE THYROXINE: CPT

## 2023-07-10 PROCEDURE — 85025 COMPLETE CBC W/AUTO DIFF WBC: CPT

## 2023-07-10 PROCEDURE — 36415 COLL VENOUS BLD VENIPUNCTURE: CPT

## 2023-07-14 ENCOUNTER — OFFICE VISIT (OUTPATIENT)
Dept: FAMILY MEDICINE CLINIC | Facility: CLINIC | Age: 63
End: 2023-07-14
Payer: COMMERCIAL

## 2023-07-14 VITALS
HEIGHT: 71 IN | TEMPERATURE: 98 F | DIASTOLIC BLOOD PRESSURE: 78 MMHG | WEIGHT: 248 LBS | HEART RATE: 52 BPM | SYSTOLIC BLOOD PRESSURE: 130 MMHG | RESPIRATION RATE: 18 BRPM | OXYGEN SATURATION: 98 % | BODY MASS INDEX: 34.72 KG/M2

## 2023-07-14 DIAGNOSIS — E78.2 MIXED HYPERLIPIDEMIA: ICD-10-CM

## 2023-07-14 DIAGNOSIS — Z00.00 ROUTINE GENERAL MEDICAL EXAMINATION AT A HEALTH CARE FACILITY: Primary | ICD-10-CM

## 2023-07-14 DIAGNOSIS — I10 ESSENTIAL HYPERTENSION: ICD-10-CM

## 2023-07-14 DIAGNOSIS — I48.0 PAROXYSMAL A-FIB (HCC): ICD-10-CM

## 2023-07-14 DIAGNOSIS — I25.10 ATHEROSCLEROSIS OF NATIVE CORONARY ARTERY OF NATIVE HEART WITHOUT ANGINA PECTORIS: ICD-10-CM

## 2023-07-14 DIAGNOSIS — E03.9 ACQUIRED HYPOTHYROIDISM: ICD-10-CM

## 2023-07-14 DIAGNOSIS — E87.5 HYPERKALEMIA: ICD-10-CM

## 2023-07-14 DIAGNOSIS — Z12.83 SCREENING FOR SKIN CANCER: ICD-10-CM

## 2023-07-14 DIAGNOSIS — E11.9 CONTROLLED TYPE 2 DIABETES MELLITUS WITHOUT COMPLICATION, WITHOUT LONG-TERM CURRENT USE OF INSULIN (HCC): ICD-10-CM

## 2023-07-14 PROBLEM — R73.01 IFG (IMPAIRED FASTING GLUCOSE): Status: RESOLVED | Noted: 2019-06-07 | Resolved: 2023-07-14

## 2023-07-14 PROBLEM — I48.92 NEW ONSET ATRIAL FLUTTER (HCC): Status: RESOLVED | Noted: 2021-04-13 | Resolved: 2023-07-14

## 2023-07-14 PROBLEM — G45.9 TRANSIENT CEREBRAL ISCHEMIA, UNSPECIFIED TYPE: Status: RESOLVED | Noted: 2018-01-18 | Resolved: 2023-07-14

## 2023-07-14 PROCEDURE — 3008F BODY MASS INDEX DOCD: CPT | Performed by: FAMILY MEDICINE

## 2023-07-14 PROCEDURE — 99214 OFFICE O/P EST MOD 30 MIN: CPT | Performed by: FAMILY MEDICINE

## 2023-07-14 PROCEDURE — 99396 PREV VISIT EST AGE 40-64: CPT | Performed by: FAMILY MEDICINE

## 2023-07-14 PROCEDURE — 3075F SYST BP GE 130 - 139MM HG: CPT | Performed by: FAMILY MEDICINE

## 2023-07-14 PROCEDURE — 3078F DIAST BP <80 MM HG: CPT | Performed by: FAMILY MEDICINE

## 2023-07-14 RX ORDER — OMEGA-3-ACID ETHYL ESTERS 1 G/1
2 CAPSULE, LIQUID FILLED ORAL 2 TIMES DAILY
Qty: 360 CAPSULE | Refills: 0 | Status: SHIPPED | OUTPATIENT
Start: 2023-07-14

## 2023-07-14 RX ORDER — METFORMIN HYDROCHLORIDE 500 MG/1
500 TABLET, EXTENDED RELEASE ORAL DAILY
Qty: 90 TABLET | Refills: 0 | Status: SHIPPED | OUTPATIENT
Start: 2023-07-14

## 2023-07-14 RX ORDER — AMLODIPINE BESYLATE 5 MG/1
5 TABLET ORAL DAILY
Qty: 90 TABLET | Refills: 0 | Status: SHIPPED | OUTPATIENT
Start: 2023-07-14

## 2023-07-17 ENCOUNTER — MED REC SCAN ONLY (OUTPATIENT)
Dept: FAMILY MEDICINE CLINIC | Facility: CLINIC | Age: 63
End: 2023-07-17

## 2023-07-20 NOTE — TELEPHONE ENCOUNTER
LOV 10/29/2021    Last labs 10/6/21    Future Appointments   Date Time Provider Steffi Karen   10/8/2022 11:15 AM Juan Pablo Hadley MD [facility]       Routine fasting labs pended for your approval if ok. Please review and advise. Thank you. No

## 2023-08-25 ENCOUNTER — HOSPITAL ENCOUNTER (OUTPATIENT)
Age: 63
Discharge: HOME | End: 2023-08-25
Payer: COMMERCIAL

## 2023-08-25 VITALS
WEIGHT: 232 LBS | DIASTOLIC BLOOD PRESSURE: 58 MMHG | HEART RATE: 54 BPM | SYSTOLIC BLOOD PRESSURE: 150 MMHG | TEMPERATURE: 98 F | RESPIRATION RATE: 16 BRPM | HEIGHT: 73 IN | BODY MASS INDEX: 30.75 KG/M2 | OXYGEN SATURATION: 96 %

## 2023-08-25 DIAGNOSIS — L03.213 PERIORBITAL CELLULITIS OF LEFT EYE: Primary | ICD-10-CM

## 2023-08-25 DIAGNOSIS — S05.02XA ABRASION OF LEFT CORNEA, INITIAL ENCOUNTER: ICD-10-CM

## 2023-08-25 PROCEDURE — 99213 OFFICE O/P EST LOW 20 MIN: CPT | Performed by: PHYSICIAN ASSISTANT

## 2023-08-25 RX ORDER — SULFAMETHOXAZOLE AND TRIMETHOPRIM 800; 160 MG/1; MG/1
1 TABLET ORAL 2 TIMES DAILY
Qty: 20 TABLET | Refills: 0 | Status: SHIPPED | OUTPATIENT
Start: 2023-08-25 | End: 2023-09-04

## 2023-08-25 RX ORDER — MOXIFLOXACIN 5 MG/ML
1 SOLUTION/ DROPS OPHTHALMIC 3 TIMES DAILY
Qty: 1 EACH | Refills: 0 | Status: SHIPPED | OUTPATIENT
Start: 2023-08-25 | End: 2023-09-01

## 2023-08-25 RX ORDER — TETRACAINE HYDROCHLORIDE 5 MG/ML
1 SOLUTION OPHTHALMIC ONCE
Status: COMPLETED | OUTPATIENT
Start: 2023-08-25 | End: 2023-08-25
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
## 2023-10-02 ENCOUNTER — IMMUNIZATION (OUTPATIENT)
Dept: LAB | Age: 63
End: 2023-10-02
Attending: EMERGENCY MEDICINE
Payer: COMMERCIAL

## 2023-10-02 DIAGNOSIS — Z23 NEED FOR VACCINATION: Primary | ICD-10-CM

## 2023-10-02 PROCEDURE — 90480 ADMN SARSCOV2 VAC 1/ONLY CMP: CPT

## 2023-10-12 DIAGNOSIS — E03.9 HYPOTHYROIDISM, UNSPECIFIED TYPE: ICD-10-CM

## 2023-10-12 RX ORDER — LEVOTHYROXINE SODIUM 0.03 MG/1
TABLET ORAL
Qty: 90 TABLET | Refills: 0 | Status: SHIPPED | OUTPATIENT
Start: 2023-10-12

## 2023-10-12 NOTE — TELEPHONE ENCOUNTER
Thyroid Supplements Protocol Idowyl83/12/2023 08:14 AM   Protocol Details TSH test in past 12 months    TSH value between 0.350 and 5.500 IU/ml    Appointment in past 12 or next 3 months       LOV 7/14/23 dr cortez     LAST LAB 7/10/23     LAST RX  3/6/23 90 with 1     Next OV   Future Appointments   Date Time Provider Steffi Jones   1/12/2024  8:20 AM Roshni Bull MD [facility]         PROTOCOL pass

## 2023-11-05 DIAGNOSIS — I10 ESSENTIAL HYPERTENSION: ICD-10-CM

## 2023-11-06 RX ORDER — AMLODIPINE BESYLATE 5 MG/1
5 TABLET ORAL DAILY
Qty: 90 TABLET | Refills: 0 | Status: SHIPPED | OUTPATIENT
Start: 2023-11-06

## 2023-11-29 DIAGNOSIS — E11.9 CONTROLLED TYPE 2 DIABETES MELLITUS WITHOUT COMPLICATION, WITHOUT LONG-TERM CURRENT USE OF INSULIN (HCC): ICD-10-CM

## 2023-11-29 DIAGNOSIS — I25.10 ATHEROSCLEROSIS OF NATIVE CORONARY ARTERY OF NATIVE HEART WITHOUT ANGINA PECTORIS: ICD-10-CM

## 2023-11-29 DIAGNOSIS — E78.2 MIXED HYPERLIPIDEMIA: ICD-10-CM

## 2023-11-30 RX ORDER — METFORMIN HYDROCHLORIDE 500 MG/1
500 TABLET, EXTENDED RELEASE ORAL DAILY
Qty: 90 TABLET | Refills: 0 | Status: SHIPPED | OUTPATIENT
Start: 2023-11-30

## 2023-11-30 RX ORDER — OMEGA-3-ACID ETHYL ESTERS 1 G/1
2 CAPSULE, LIQUID FILLED ORAL 2 TIMES DAILY
Qty: 360 CAPSULE | Refills: 0 | Status: SHIPPED | OUTPATIENT
Start: 2023-11-30

## 2023-11-30 NOTE — TELEPHONE ENCOUNTER
Diabetic Medication Protocol [id number]/29/2023 06:40 PM   Protocol Details HgBA1C procedure resulted in past 6 months    Last HgBA1C < 7.5    Microalbumin procedure in past 12 months or taking ACE/ARB    Appointment in past 6 or next 3 months        LOV 7/13/23 dr cortez     LAST LAB 7/10/23    LAST RX 7/14/23 90     Next OV   Future Appointments   Date Time Provider Steffi Jones   1/12/2024  8:20 AM Noah Fuentes MD [facility]         PROTOCOL pass

## 2024-01-01 NOTE — TELEPHONE ENCOUNTER
LOV 10/9/2021      LAST LAB 12/23/2021    LAST RX   levothyroxine 25 MCG Oral Tab 30 tablet 3 10/9/2021         Next OV No future appointments.       PROTOCOL passed non-reactive

## 2024-01-07 DIAGNOSIS — E03.9 HYPOTHYROIDISM, UNSPECIFIED TYPE: ICD-10-CM

## 2024-01-08 RX ORDER — LEVOTHYROXINE SODIUM 0.03 MG/1
TABLET ORAL
Qty: 90 TABLET | Refills: 0 | Status: SHIPPED | OUTPATIENT
Start: 2024-01-08

## 2024-01-09 ENCOUNTER — LAB ENCOUNTER (OUTPATIENT)
Dept: LAB | Age: 64
End: 2024-01-09
Attending: FAMILY MEDICINE
Payer: COMMERCIAL

## 2024-01-09 DIAGNOSIS — E11.9 CONTROLLED TYPE 2 DIABETES MELLITUS WITHOUT COMPLICATION, WITHOUT LONG-TERM CURRENT USE OF INSULIN (HCC): ICD-10-CM

## 2024-01-09 DIAGNOSIS — E87.5 HYPERKALEMIA: ICD-10-CM

## 2024-01-09 DIAGNOSIS — E78.2 MIXED HYPERLIPIDEMIA: ICD-10-CM

## 2024-01-09 DIAGNOSIS — E03.9 ACQUIRED HYPOTHYROIDISM: ICD-10-CM

## 2024-01-09 LAB
ALBUMIN SERPL-MCNC: 4.3 G/DL (ref 3.4–5)
ALBUMIN/GLOB SERPL: 1.3 {RATIO} (ref 1–2)
ALP LIVER SERPL-CCNC: 73 U/L
ALT SERPL-CCNC: 46 U/L
ANION GAP SERPL CALC-SCNC: 4 MMOL/L (ref 0–18)
AST SERPL-CCNC: 16 U/L (ref 15–37)
BILIRUB SERPL-MCNC: 0.6 MG/DL (ref 0.1–2)
BUN BLD-MCNC: 18 MG/DL (ref 9–23)
CALCIUM BLD-MCNC: 9.4 MG/DL (ref 8.5–10.1)
CHLORIDE SERPL-SCNC: 105 MMOL/L (ref 98–112)
CHOLEST SERPL-MCNC: 167 MG/DL (ref ?–200)
CO2 SERPL-SCNC: 28 MMOL/L (ref 21–32)
CREAT BLD-MCNC: 1.15 MG/DL
EGFRCR SERPLBLD CKD-EPI 2021: 71 ML/MIN/1.73M2 (ref 60–?)
EST. AVERAGE GLUCOSE BLD GHB EST-MCNC: 166 MG/DL (ref 68–126)
FASTING PATIENT LIPID ANSWER: YES
FASTING STATUS PATIENT QL REPORTED: YES
GLOBULIN PLAS-MCNC: 3.2 G/DL (ref 2.8–4.4)
GLUCOSE BLD-MCNC: 190 MG/DL (ref 70–99)
HBA1C MFR BLD: 7.4 % (ref ?–5.7)
HDLC SERPL-MCNC: 38 MG/DL (ref 40–59)
LDLC SERPL CALC-MCNC: 59 MG/DL (ref ?–100)
NONHDLC SERPL-MCNC: 129 MG/DL (ref ?–130)
OSMOLALITY SERPL CALC.SUM OF ELEC: 291 MOSM/KG (ref 275–295)
POTASSIUM SERPL-SCNC: 4.2 MMOL/L (ref 3.5–5.1)
PROT SERPL-MCNC: 7.5 G/DL (ref 6.4–8.2)
SODIUM SERPL-SCNC: 137 MMOL/L (ref 136–145)
T4 FREE SERPL-MCNC: 0.9 NG/DL (ref 0.8–1.7)
TRIGL SERPL-MCNC: 466 MG/DL (ref 30–149)
TSI SER-ACNC: 5.33 MIU/ML (ref 0.36–3.74)
VLDLC SERPL CALC-MCNC: 69 MG/DL (ref 0–30)

## 2024-01-09 PROCEDURE — 80053 COMPREHEN METABOLIC PANEL: CPT

## 2024-01-09 PROCEDURE — 84443 ASSAY THYROID STIM HORMONE: CPT

## 2024-01-09 PROCEDURE — 84439 ASSAY OF FREE THYROXINE: CPT

## 2024-01-09 PROCEDURE — 80061 LIPID PANEL: CPT

## 2024-01-09 PROCEDURE — 83036 HEMOGLOBIN GLYCOSYLATED A1C: CPT

## 2024-01-12 ENCOUNTER — TELEMEDICINE (OUTPATIENT)
Dept: FAMILY MEDICINE CLINIC | Facility: CLINIC | Age: 64
End: 2024-01-12
Payer: COMMERCIAL

## 2024-01-12 DIAGNOSIS — I10 ESSENTIAL HYPERTENSION: ICD-10-CM

## 2024-01-12 DIAGNOSIS — I48.0 PAROXYSMAL A-FIB (HCC): ICD-10-CM

## 2024-01-12 DIAGNOSIS — E03.9 ACQUIRED HYPOTHYROIDISM: ICD-10-CM

## 2024-01-12 DIAGNOSIS — E78.2 MIXED HYPERLIPIDEMIA: Primary | ICD-10-CM

## 2024-01-12 DIAGNOSIS — E11.9 CONTROLLED TYPE 2 DIABETES MELLITUS WITHOUT COMPLICATION, WITHOUT LONG-TERM CURRENT USE OF INSULIN (HCC): ICD-10-CM

## 2024-01-12 DIAGNOSIS — I25.10 ATHEROSCLEROSIS OF NATIVE CORONARY ARTERY OF NATIVE HEART WITHOUT ANGINA PECTORIS: ICD-10-CM

## 2024-01-12 PROBLEM — I48.92 ATRIAL FLUTTER WITH RAPID VENTRICULAR RESPONSE (HCC): Status: RESOLVED | Noted: 2021-04-13 | Resolved: 2024-01-12

## 2024-01-12 PROCEDURE — 99214 OFFICE O/P EST MOD 30 MIN: CPT | Performed by: FAMILY MEDICINE

## 2024-01-12 RX ORDER — LEVOTHYROXINE SODIUM 0.05 MG/1
50 TABLET ORAL
Qty: 60 TABLET | Refills: 0 | Status: SHIPPED | OUTPATIENT
Start: 2024-01-12

## 2024-01-12 RX ORDER — AMLODIPINE BESYLATE 5 MG/1
5 TABLET ORAL DAILY
Qty: 90 TABLET | Refills: 0 | Status: SHIPPED | OUTPATIENT
Start: 2024-01-12

## 2024-01-12 RX ORDER — LEVOTHYROXINE SODIUM 0.03 MG/1
25 TABLET ORAL
Qty: 90 TABLET | Refills: 3 | Status: SHIPPED | OUTPATIENT
Start: 2024-01-12 | End: 2024-01-12

## 2024-01-12 RX ORDER — OMEGA-3-ACID ETHYL ESTERS 1 G/1
2 CAPSULE, LIQUID FILLED ORAL 2 TIMES DAILY
Qty: 360 CAPSULE | Refills: 0 | Status: SHIPPED | OUTPATIENT
Start: 2024-01-12

## 2024-01-12 RX ORDER — METFORMIN HYDROCHLORIDE 500 MG/1
500 TABLET, EXTENDED RELEASE ORAL DAILY
Qty: 90 TABLET | Refills: 0 | Status: SHIPPED | OUTPATIENT
Start: 2024-01-12

## 2024-01-12 NOTE — PROGRESS NOTES
Bertrand Gonzalez is a 63 year old male.  Chief Complaint   Patient presents with    Medication Follow-Up     This visit is conducted using telemedicine with live interactive video and audio. Bertrand Gonzalez verbally consents to virtual video visit.   Patient understands and accepts financial responsibility for any deductible and/or co-pays associated with the service.    HPI:   Bertrand Gonzalez is a 63 year old male with diabetes, hypertension, hyperlipidemia PAF, TAO seen for follow up.    Compliant with diabetes medication but not with diet, states during the holidays ate carbs, sweets and was drinking a beer almost daily. Due for eye exam soon and will schedule an appointment, states has been feeling tired easily. Denies increased urination or thirst.    Compliant with cholesterol medication but not with diet, tolerating medication well without any side effects.    Compliant with blood pressure medication and partial compliance with low salt diet.    Uses his CPAP daily.    Compliant with thyroid medication, tolerating medication well without any side effects. Weight gain and fatigue.    ALLERGY:     Allergies   Allergen Reactions    Penicillin V Potassium      MEDICATIONS:     Current Outpatient Medications   Medication Sig Dispense Refill    LEVOTHYROXINE 25 MCG Oral Tab TAKE 1 TABLET(25 MCG) BY MOUTH BEFORE BREAKFAST 90 tablet 0    OMEGA-3-ACID ETHYL ESTERS 1 g Oral Cap TAKE 2 CAPSULES BY MOUTH 2 TIMES DAILY 360 capsule 0    METFORMIN  MG Oral Tablet 24 Hr TAKE 1 TABLET(500 MG) BY MOUTH DAILY 90 tablet 0    AMLODIPINE 5 MG Oral Tab TAKE 1 TABLET(5 MG) BY MOUTH DAILY 90 tablet 0    losartan 100 MG Oral Tab Take 1 tablet (100 mg total) by mouth daily. 90 tablet 2    atorvastatin 40 MG Oral Tab Take 1 tablet (40 mg total) by mouth nightly. 90 tablet 2    apixaban 5 MG Oral Tab Take 1 tablet (5 mg total) by mouth 2 (two) times daily. 60 tablet 3    aspirin 81 MG Oral Tab EC Take 1 tablet (81 mg total) by  mouth daily.      Nutritional Supplements (PROSTATE ENZYME FORMULA OR) Take 1 tablet by mouth daily. Super beta prostate      FLUoxetine HCl 20 MG Oral Cap Take 1 capsule (20 mg total) by mouth daily. 30 capsule 1    ClonazePAM 0.5 MG Oral Tab Take 1 tablet (0.5 mg total) by mouth nightly as needed for Anxiety. 30 tablet 1    Multiple Vitamin (TAB-A-ZULAY) Oral Tab Take 1 tablet by mouth daily.      Glucosa-Chondr-Na Chondr--501-634-83 MG Oral Tab Take 1 tablet by mouth daily.      atenolol 25 MG Oral Tab Take 1 tablet (25 mg total) by mouth daily. 90 tablet 1      Past Medical History:   Diagnosis Date    Actinic keratosis     Acute, but ill-defined, cerebrovascular disease Jan 26 2018 TIA    ADD (attention deficit disorder)     Allergic rhinitis due to pollen     Allergic rhinitis, cause unspecified     Anxiety     Atherosclerosis of coronary artery     Atrial fibrillation (HCC)     CAD (coronary artery disease)     stent    Carotid stenosis     Coronary atherosclerosis of native coronary artery     Decorative tattoo     Diabetes (HCC)     Disorder of prostate Physical    Easy bruising Since on blood thinner    Esophageal reflux     Essential hypertension     Essential hypertension, benign     Frequent urination 2016    Gout     Heartburn Occasionally    Hemorrhoids occasionally    Hyperlipidemia     Hyperthyroidism     Hypertrophy of prostate with urinary obstruction and other lower urinary tract symptoms (LUTS)     Impaired fasting glucose     Nocturia     Obesity     TAO (obstructive sleep apnea) 5/2/2021 [facility]    AHI 17.9 REM AHI 22.7 Supine AHI 71.5 non-supine AHI 13.7 Sao2 Brad 84.9%     Pure hypercholesterolemia     Reflux esophagitis     Sleep apnea     Stented coronary artery 2012    Stress     Unspecified hypothyroidism     Wears glasses       Social History:  Social History     Socioeconomic History    Marital status:     Number of children: 3   Occupational History    Occupation:     Tobacco Use    Smoking status: Former     Packs/day: 0.00     Years: 32.00     Additional pack years: 0.00     Total pack years: 0.00     Types: Cigarettes     Start date: 1/7/1976     Quit date: 1/28/2008     Years since quitting: 15.9    Smokeless tobacco: Former    Tobacco comments:     socail smoker   Vaping Use    Vaping Use: Never used   Substance and Sexual Activity    Alcohol use: Yes     Alcohol/week: 10.0 standard drinks of alcohol     Types: 5 Cans of beer, 5 Shots of liquor per week     Comment: Have stopped due to triggering afib    Drug use: Yes     Frequency: 1.0 times per week     Types: Cannabis     Comment: occassionally ediables    Other Topics Concern     Service Yes    Blood Transfusions No    Caffeine Concern No    Occupational Exposure No    Hobby Hazards No    Sleep Concern Yes     Comment: using melatonin    Stress Concern No    Weight Concern Yes    Special Diet No    Back Care No    Exercise Yes    Bike Helmet Yes    Seat Belt Yes    Self-Exams No   Social History Narrative     with [employer]. , 3 children.         REVIEW OF SYSTEMS:   GENERAL HEALTH: feels well otherwise  SKIN: denies any unusual skin lesions or rashes  RESPIRATORY: denies shortness of breath with exertion  CARDIOVASCULAR: denies chest pain on exertion  GI: denies abdominal pain and denies heartburn  NEURO: denies headaches    EXAM:   There were no vitals taken for this visit.  GENERAL: obese,in no apparent distress  HEENT: atraumatic, normocephalic  NECK: supple  LUNGS: act of breathing is normal  CARDIO: speaking in full sentences without any distress  NEURO: AAO X 3    Component      Latest Ref Rng 1/9/2024   Glucose      70 - 99 mg/dL 190 (H)    Sodium      136 - 145 mmol/L 137    Potassium      3.5 - 5.1 mmol/L 4.2    Chloride      98 - 112 mmol/L 105    Carbon Dioxide, Total      21.0 - 32.0 mmol/L 28.0    ANION GAP      0 - 18 mmol/L 4    BUN      9 - 23 mg/dL 18    CREATININE      0.70 -  1.30 mg/dL 1.15    CALCIUM      8.5 - 10.1 mg/dL 9.4    CALCULATED OSMOLALITY      275 - 295 mOsm/kg 291    EGFR      >=60 mL/min/1.73m2 71    AST (SGOT)      15 - 37 U/L 16    ALT (SGPT)      16 - 61 U/L 46    ALKALINE PHOSPHATASE      45 - 117 U/L 73    Total Bilirubin      0.1 - 2.0 mg/dL 0.6    PROTEIN, TOTAL      6.4 - 8.2 g/dL 7.5    Albumin      3.4 - 5.0 g/dL 4.3    Globulin      2.8 - 4.4 g/dL 3.2    A/G Ratio      1.0 - 2.0  1.3    Patient Fasting for CMP? Yes    Cholesterol, Total      <200 mg/dL 167    HDL Cholesterol      40 - 59 mg/dL 38 (L)    Triglycerides      30 - 149 mg/dL 466 (H)    LDL Cholesterol Calc      <100 mg/dL 59    VLDL      0 - 30 mg/dL 69 (H)    NON-HDL CHOLESTEROL      <130 mg/dL 129    Patient Fasting for Lipid? Yes    T4,Free (Direct)      0.8 - 1.7 ng/dL 0.9    TSH      0.358 - 3.740 mIU/mL 5.330 (H)    HEMOGLOBIN A1c      <5.7 % 7.4 (H)    ESTIMATED AVERAGE GLUCOSE      68 - 126 mg/dL 166 (H)         ASSESSMENT AND PLAN:   Bertrand was seen today for medication follow-up.    Diagnoses and all orders for this visit:      Mixed hyperlipidemia well controlled  -     omega-3-acid ethyl esters 1 g Oral Cap; Take 2 capsules (2 g total) by mouth 2 (two) times daily.  -     Lipid Panel; Future  -    continue the same dose of medication    Acquired hypothyroidism controlled  -     levothyroxine 50 MCG Oral Tab; Take 1 tablet (50 mcg total) by mouth before breakfast.  -     TSH and Free T4 [E]; Future  -    dose of levothyroxine increased to 50 mcg  -    repeat labs in 6 weeks    Paroxysmal A-fib (HCC) stable      - cpm    Controlled type 2 diabetes mellitus without complication, without long-term current use of insulin (HCC)  -     metFORMIN  MG Oral Tablet 24 Hr; Take 1 tablet (500 mg total) by mouth daily.  -     Hemoglobin A1C; Future  -     Comp Metabolic Panel (14); Future  -     Microalb/Creat Ratio, Random Urine; Future  -     continue the same dose of medication  -      continue to limit refined sugars and carbohydrates in diet      Essential hypertension controlled  -     amLODIPine 5 MG Oral Tab; Take 1 tablet (5 mg total) by mouth daily.      - continue the same dose of medication       -  limit salt to less than 1000 mg        - goal BP less than 130/80    Atherosclerosis of native coronary artery of native heart without angina pectoris stable  -     omega-3-acid ethyl esters 1 g Oral Cap; Take 2 capsules (2 g total) by mouth 2 (two) times daily.  -    continue the same dose of medication    Please note that the following visit was completed using two-way, real-time interactive audio and video communication.  This has been done in good lia to provide continuity of care . There are limitations of this visit, as no physical exam could be performed.  Visit was planned and structured to allow sufficient time to address the issues presented.  Billing for this visit takes into account review of history, labs, medications, radiology tests , consultations and/or decision making.  Appropriate medical decision-making and tests are ordered as detailed in the assessment and plan of care.       The 21st Century Cures Act makes medical notes like these available to patients in the interest of transparency. Please be advised this is a medical document. Medical documents are intended to carry relevant information, facts as evident, and the clinical opinion of the practitioner. The medical note is intended as peer to peer communication and may appear blunt or direct. It is written in medical language and may contain abbreviations or verbiage that are unfamiliar.

## 2024-01-15 RX ORDER — LEVOTHYROXINE SODIUM 0.05 MG/1
50 TABLET ORAL
Qty: 90 TABLET | Refills: 0 | OUTPATIENT
Start: 2024-01-15

## 2024-01-15 NOTE — TELEPHONE ENCOUNTER
LOV 7/14/2023    LAST LAB    LAST RX     Next OV No future appointments.    PROTOCOL   Name from pharmacy: LEVOTHYROXINE 0.05MG (50MCG) TAB         Will file in chart as: LEVOTHYROXINE 50 MCG Oral Tab     Possible duplicate: Tariq to review recent actions on this medication    Sig: TAKE 1 TABLET(50 MCG) BY MOUTH BEFORE BREAKFAST    Disp: 90 tablet    Refills: 0 (Pharmacy requested: Not specified)    Start: 1/12/2024    Class: Normal    For: Acquired hypothyroidism    To pharmacy: **Patient requests 90 days supply**    Last ordered: 3 days ago (1/12/2024) by Sarah Londono MD    Last refill: 1/12/2024    Rx #: [id number]    Thyroid Supplements Protocol Gsihlb1301/12/2024 05:13 PM   Protocol Details TSH test in past 12 months    TSH value between 0.350 and 5.500 IU/ml    Appointment in past 12 or next 3 months

## 2024-03-20 DIAGNOSIS — E03.9 ACQUIRED HYPOTHYROIDISM: ICD-10-CM

## 2024-03-22 DIAGNOSIS — E03.9 ACQUIRED HYPOTHYROIDISM: ICD-10-CM

## 2024-03-22 RX ORDER — LEVOTHYROXINE SODIUM 0.05 MG/1
50 TABLET ORAL
Qty: 45 TABLET | Refills: 0 | Status: SHIPPED | OUTPATIENT
Start: 2024-03-22

## 2024-03-22 RX ORDER — LEVOTHYROXINE SODIUM 0.05 MG/1
50 TABLET ORAL
Qty: 90 TABLET | Refills: 0 | OUTPATIENT
Start: 2024-03-22

## 2024-04-03 ENCOUNTER — LABORATORY ENCOUNTER (OUTPATIENT)
Dept: LAB | Age: 64
End: 2024-04-03
Attending: FAMILY MEDICINE
Payer: COMMERCIAL

## 2024-04-03 DIAGNOSIS — E03.9 ACQUIRED HYPOTHYROIDISM: ICD-10-CM

## 2024-04-03 DIAGNOSIS — E11.9 CONTROLLED TYPE 2 DIABETES MELLITUS WITHOUT COMPLICATION, WITHOUT LONG-TERM CURRENT USE OF INSULIN (HCC): ICD-10-CM

## 2024-04-03 DIAGNOSIS — E78.2 MIXED HYPERLIPIDEMIA: ICD-10-CM

## 2024-04-03 LAB
ALBUMIN SERPL-MCNC: 4.1 G/DL (ref 3.4–5)
ALBUMIN/GLOB SERPL: 1.5 {RATIO} (ref 1–2)
ALP LIVER SERPL-CCNC: 64 U/L
ALT SERPL-CCNC: 32 U/L
ANION GAP SERPL CALC-SCNC: 6 MMOL/L (ref 0–18)
AST SERPL-CCNC: 20 U/L (ref 15–37)
BILIRUB SERPL-MCNC: 0.5 MG/DL (ref 0.1–2)
BUN BLD-MCNC: 17 MG/DL (ref 9–23)
CALCIUM BLD-MCNC: 10 MG/DL (ref 8.5–10.1)
CHLORIDE SERPL-SCNC: 106 MMOL/L (ref 98–112)
CHOLEST SERPL-MCNC: 126 MG/DL (ref ?–200)
CO2 SERPL-SCNC: 27 MMOL/L (ref 21–32)
CREAT BLD-MCNC: 1.21 MG/DL
CREAT UR-SCNC: 186 MG/DL
EGFRCR SERPLBLD CKD-EPI 2021: 66 ML/MIN/1.73M2 (ref 60–?)
EST. AVERAGE GLUCOSE BLD GHB EST-MCNC: 151 MG/DL (ref 68–126)
FASTING PATIENT LIPID ANSWER: YES
FASTING STATUS PATIENT QL REPORTED: YES
GLOBULIN PLAS-MCNC: 2.7 G/DL (ref 2.8–4.4)
GLUCOSE BLD-MCNC: 150 MG/DL (ref 70–99)
HBA1C MFR BLD: 6.9 % (ref ?–5.7)
HDLC SERPL-MCNC: 33 MG/DL (ref 40–59)
LDLC SERPL CALC-MCNC: 51 MG/DL (ref ?–100)
MICROALBUMIN UR-MCNC: 7.8 MG/DL
MICROALBUMIN/CREAT 24H UR-RTO: 41.9 UG/MG (ref ?–30)
NONHDLC SERPL-MCNC: 93 MG/DL (ref ?–130)
OSMOLALITY SERPL CALC.SUM OF ELEC: 292 MOSM/KG (ref 275–295)
POTASSIUM SERPL-SCNC: 4.2 MMOL/L (ref 3.5–5.1)
PROT SERPL-MCNC: 6.8 G/DL (ref 6.4–8.2)
SODIUM SERPL-SCNC: 139 MMOL/L (ref 136–145)
T4 FREE SERPL-MCNC: 0.9 NG/DL (ref 0.8–1.7)
TRIGL SERPL-MCNC: 268 MG/DL (ref 30–149)
TSI SER-ACNC: 3.55 MIU/ML (ref 0.36–3.74)
VLDLC SERPL CALC-MCNC: 38 MG/DL (ref 0–30)

## 2024-04-03 PROCEDURE — 80053 COMPREHEN METABOLIC PANEL: CPT | Performed by: FAMILY MEDICINE

## 2024-04-03 PROCEDURE — 84439 ASSAY OF FREE THYROXINE: CPT | Performed by: FAMILY MEDICINE

## 2024-04-03 PROCEDURE — 80061 LIPID PANEL: CPT | Performed by: FAMILY MEDICINE

## 2024-04-03 PROCEDURE — 83036 HEMOGLOBIN GLYCOSYLATED A1C: CPT | Performed by: FAMILY MEDICINE

## 2024-04-03 PROCEDURE — 82043 UR ALBUMIN QUANTITATIVE: CPT | Performed by: FAMILY MEDICINE

## 2024-04-03 PROCEDURE — 84443 ASSAY THYROID STIM HORMONE: CPT | Performed by: FAMILY MEDICINE

## 2024-04-03 PROCEDURE — 82570 ASSAY OF URINE CREATININE: CPT | Performed by: FAMILY MEDICINE

## 2024-04-09 ENCOUNTER — TELEPHONE (OUTPATIENT)
Dept: FAMILY MEDICINE CLINIC | Facility: CLINIC | Age: 64
End: 2024-04-09

## 2024-04-11 NOTE — TELEPHONE ENCOUNTER
PA initiated via covermymeds for omega-3-acid ethyl esters 1 g: Key: [id number]  Approved  Case Id: [id number]  Coverage Start Date:03/12/2024  Coverage End Date:04/11/2025    [phone]   Called pt to inform PA completed and received Approval through 4/11/25. No further questions. Pt verbalized understanding and agreed with POC.

## 2024-04-12 DIAGNOSIS — I10 ESSENTIAL HYPERTENSION: ICD-10-CM

## 2024-04-14 ENCOUNTER — APPOINTMENT (OUTPATIENT)
Dept: GENERAL RADIOLOGY | Facility: HOSPITAL | Age: 64
End: 2024-04-14
Attending: EMERGENCY MEDICINE
Payer: COMMERCIAL

## 2024-04-14 ENCOUNTER — HOSPITAL ENCOUNTER (INPATIENT)
Facility: HOSPITAL | Age: 64
LOS: 1 days | Discharge: HOME | End: 2024-04-15
Attending: EMERGENCY MEDICINE | Admitting: STUDENT IN AN ORGANIZED HEALTH CARE EDUCATION/TRAINING PROGRAM
Payer: COMMERCIAL

## 2024-04-14 ENCOUNTER — HOSPITAL ENCOUNTER (OUTPATIENT)
Facility: HOSPITAL | Age: 64
Setting detail: OBSERVATION
Discharge: HOME | End: 2024-04-15
Attending: EMERGENCY MEDICINE | Admitting: STUDENT IN AN ORGANIZED HEALTH CARE EDUCATION/TRAINING PROGRAM
Payer: COMMERCIAL

## 2024-04-14 DIAGNOSIS — R53.83 OTHER FATIGUE: ICD-10-CM

## 2024-04-14 DIAGNOSIS — Z79.01 ANTICOAGULATED: ICD-10-CM

## 2024-04-14 DIAGNOSIS — I48.91 ATRIAL FIBRILLATION WITH CONTROLLED VENTRICULAR RESPONSE (HCC): Primary | ICD-10-CM

## 2024-04-14 LAB
ALBUMIN SERPL-MCNC: 4 G/DL (ref 3.4–5)
ALBUMIN/GLOB SERPL: 1.3 {RATIO} (ref 1–2)
ALP LIVER SERPL-CCNC: 70 U/L
ALT SERPL-CCNC: 29 U/L
ANION GAP SERPL CALC-SCNC: 7 MMOL/L (ref 0–18)
AST SERPL-CCNC: 19 U/L (ref 15–37)
BASOPHILS # BLD AUTO: 0.06 X10(3) UL (ref 0–0.2)
BASOPHILS NFR BLD AUTO: 0.7 %
BILIRUB SERPL-MCNC: 0.6 MG/DL (ref 0.1–2)
BUN BLD-MCNC: 24 MG/DL (ref 9–23)
CALCIUM BLD-MCNC: 9.4 MG/DL (ref 8.5–10.1)
CHLORIDE SERPL-SCNC: 107 MMOL/L (ref 98–112)
CO2 SERPL-SCNC: 24 MMOL/L (ref 21–32)
CREAT BLD-MCNC: 1.21 MG/DL
EGFRCR SERPLBLD CKD-EPI 2021: 66 ML/MIN/1.73M2 (ref 60–?)
EOSINOPHIL # BLD AUTO: 0.23 X10(3) UL (ref 0–0.7)
EOSINOPHIL NFR BLD AUTO: 2.9 %
ERYTHROCYTE [DISTWIDTH] IN BLOOD BY AUTOMATED COUNT: 12.6 %
GLOBULIN PLAS-MCNC: 3 G/DL (ref 2.8–4.4)
GLUCOSE BLD-MCNC: 126 MG/DL (ref 70–99)
GLUCOSE BLD-MCNC: 132 MG/DL (ref 70–99)
HCT VFR BLD AUTO: 41.4 %
HGB BLD-MCNC: 14.4 G/DL
IMM GRANULOCYTES # BLD AUTO: 0.03 X10(3) UL (ref 0–1)
IMM GRANULOCYTES NFR BLD: 0.4 %
LYMPHOCYTES # BLD AUTO: 2.98 X10(3) UL (ref 1–4)
LYMPHOCYTES NFR BLD AUTO: 37.2 %
MCH RBC QN AUTO: 29.5 PG (ref 26–34)
MCHC RBC AUTO-ENTMCNC: 34.8 G/DL (ref 31–37)
MCV RBC AUTO: 84.8 FL
MONOCYTES # BLD AUTO: 0.61 X10(3) UL (ref 0.1–1)
MONOCYTES NFR BLD AUTO: 7.6 %
NEUTROPHILS # BLD AUTO: 4.11 X10 (3) UL (ref 1.5–7.7)
NEUTROPHILS # BLD AUTO: 4.11 X10(3) UL (ref 1.5–7.7)
NEUTROPHILS NFR BLD AUTO: 51.2 %
NT-PROBNP SERPL-MCNC: 278 PG/ML (ref ?–125)
OSMOLALITY SERPL CALC.SUM OF ELEC: 292 MOSM/KG (ref 275–295)
PLATELET # BLD AUTO: 285 10(3)UL (ref 150–450)
POTASSIUM SERPL-SCNC: 4.3 MMOL/L (ref 3.5–5.1)
PROT SERPL-MCNC: 7 G/DL (ref 6.4–8.2)
Q-T INTERVAL: 380 MS
QRS DURATION: 92 MS
QTC CALCULATION (BEZET): 421 MS
R AXIS: 8 DEGREES
RBC # BLD AUTO: 4.88 X10(6)UL
SARS-COV-2 RNA RESP QL NAA+PROBE: NOT DETECTED
SODIUM SERPL-SCNC: 138 MMOL/L (ref 136–145)
T AXIS: 13 DEGREES
TROPONIN I SERPL HS-MCNC: 4 NG/L
VENTRICULAR RATE: 74 BPM
WBC # BLD AUTO: 8 X10(3) UL (ref 4–11)

## 2024-04-14 PROCEDURE — 99232 SBSQ HOSP IP/OBS MODERATE 35: CPT | Performed by: HOSPITALIST

## 2024-04-14 PROCEDURE — 71045 X-RAY EXAM CHEST 1 VIEW: CPT | Performed by: EMERGENCY MEDICINE

## 2024-04-14 RX ORDER — NICOTINE POLACRILEX 4 MG
30 LOZENGE BUCCAL
Status: DISCONTINUED | OUTPATIENT
Start: 2024-04-14 | End: 2024-04-15

## 2024-04-14 RX ORDER — DEXTROSE MONOHYDRATE 25 G/50ML
50 INJECTION, SOLUTION INTRAVENOUS
Status: DISCONTINUED | OUTPATIENT
Start: 2024-04-14 | End: 2024-04-15

## 2024-04-14 RX ORDER — METOCLOPRAMIDE HYDROCHLORIDE 5 MG/ML
10 INJECTION INTRAMUSCULAR; INTRAVENOUS EVERY 8 HOURS PRN
Status: DISCONTINUED | OUTPATIENT
Start: 2024-04-14 | End: 2024-04-15

## 2024-04-14 RX ORDER — BISACODYL 10 MG
10 SUPPOSITORY, RECTAL RECTAL
Status: DISCONTINUED | OUTPATIENT
Start: 2024-04-14 | End: 2024-04-15

## 2024-04-14 RX ORDER — CLONAZEPAM 0.5 MG/1
0.5 TABLET ORAL NIGHTLY PRN
Status: DISCONTINUED | OUTPATIENT
Start: 2024-04-14 | End: 2024-04-15

## 2024-04-14 RX ORDER — LOSARTAN POTASSIUM 100 MG/1
100 TABLET ORAL DAILY
Status: DISCONTINUED | OUTPATIENT
Start: 2024-04-15 | End: 2024-04-15

## 2024-04-14 RX ORDER — ONDANSETRON 2 MG/ML
4 INJECTION INTRAMUSCULAR; INTRAVENOUS EVERY 6 HOURS PRN
Status: DISCONTINUED | OUTPATIENT
Start: 2024-04-14 | End: 2024-04-15

## 2024-04-14 RX ORDER — ENEMA 19; 7 G/133ML; G/133ML
1 ENEMA RECTAL ONCE AS NEEDED
Status: DISCONTINUED | OUTPATIENT
Start: 2024-04-14 | End: 2024-04-15

## 2024-04-14 RX ORDER — SODIUM CHLORIDE 9 MG/ML
INJECTION, SOLUTION INTRAVENOUS CONTINUOUS
Status: DISCONTINUED | OUTPATIENT
Start: 2024-04-14 | End: 2024-04-15

## 2024-04-14 RX ORDER — AMLODIPINE BESYLATE 5 MG/1
5 TABLET ORAL DAILY
Status: DISCONTINUED | OUTPATIENT
Start: 2024-04-15 | End: 2024-04-15

## 2024-04-14 RX ORDER — FLUOXETINE HYDROCHLORIDE 20 MG/1
20 CAPSULE ORAL DAILY
Status: DISCONTINUED | OUTPATIENT
Start: 2024-04-14 | End: 2024-04-15

## 2024-04-14 RX ORDER — POLYETHYLENE GLYCOL 3350 17 G/17G
17 POWDER, FOR SOLUTION ORAL DAILY PRN
Status: DISCONTINUED | OUTPATIENT
Start: 2024-04-14 | End: 2024-04-15

## 2024-04-14 RX ORDER — ATENOLOL 25 MG/1
25 TABLET ORAL
Status: DISCONTINUED | OUTPATIENT
Start: 2024-04-15 | End: 2024-04-15

## 2024-04-14 RX ORDER — ASPIRIN 81 MG/1
81 TABLET ORAL DAILY
Status: DISCONTINUED | OUTPATIENT
Start: 2024-04-15 | End: 2024-04-15

## 2024-04-14 RX ORDER — DOXEPIN HYDROCHLORIDE 50 MG/1
1 CAPSULE ORAL DAILY
Status: DISCONTINUED | OUTPATIENT
Start: 2024-04-15 | End: 2024-04-15

## 2024-04-14 RX ORDER — ATORVASTATIN CALCIUM 40 MG/1
40 TABLET, FILM COATED ORAL DAILY
Status: DISCONTINUED | OUTPATIENT
Start: 2024-04-15 | End: 2024-04-15

## 2024-04-14 RX ORDER — LEVOTHYROXINE SODIUM 0.05 MG/1
50 TABLET ORAL
Status: DISCONTINUED | OUTPATIENT
Start: 2024-04-15 | End: 2024-04-15

## 2024-04-14 RX ORDER — SENNOSIDES 8.6 MG
17.2 TABLET ORAL NIGHTLY PRN
Status: DISCONTINUED | OUTPATIENT
Start: 2024-04-14 | End: 2024-04-15

## 2024-04-14 RX ORDER — NICOTINE POLACRILEX 4 MG
15 LOZENGE BUCCAL
Status: DISCONTINUED | OUTPATIENT
Start: 2024-04-14 | End: 2024-04-15

## 2024-04-14 NOTE — HISTORICAL OFFICE NOTE
[facility]  [address] [place] [address], 4[address] floor, [place] [address]  [phone]      Bertrand Gonzalez  Progress Note  Demographics:  Name: Bertrand Gonzalez YOB: 1960  Age: 62, Male Medical Record No: [id number]  Visited Date/Time: 04/03/2023 09:30 AM    Chief Complaints  annual follow up  History of Present Illness  Patient with abnormal coronary calcium score.  Your Scroggin Ayo.  His Apple Watch is showed some question A-fib in the past.  He has had prior coronary stenting.  He is exercising regularly.  Cardiac risk factors Hypertension, Dyslipidemia and Former smoker  Past Medical History  1.PAF (paroxysmal atrial fibrillation)  2.Coronary artery calcification seen on CT scan  3.Hypertension (HTN), primary  4.Mixed hyperlipidemia  5.Asymptomatic carotid artery stenosis, bilateral  6.Coronary artery disease involving native coronary artery of native heart without angina pectoris  7.IFG (impaired fasting glucose)  Past Surgical History  1.History of CEA (carotid endarterectomy), right - Hx  2.PTCA with insertion of stent  Family History  1. Father Age 64 - Stroke (Reason for death)  2. Mother Age 85 - Unspecified dementia with behavioral disturbance (Reason for death)  Social History  Smoking status Former smoker  Tobacco usage - No (Ex-smoker (finding))  Alcohol usage - Yes (twice a week )  Review of systems  Cardiovascular No history of Chest pain, AGUIAR, Palpitations, Syncope, PND, Orthopnea, Edema and Claudication  Respiratory No history of SOB  Physical Examination  Vitals Right Arm Sitting  / 72 mmHg, Pulse rate 46 bpm, Height in 5' 10\", BMI: 35.2, Weight in 245 lbs (or) 111.13 kgs and BSA : 2.38 cc/m²  General Appearance No Acute Distress and Normal Body habitus  Head/Eyes/Ears/Nose/Mouth/Throat Conjunctiva pink and Mucous membranes Moist  Neck Normal carotid pulsations, No carotid bruits and No JVD  Respiratory Unlabored and Lungs clear with normal breath  sounds  Cardiovascular Intact distal pulses and Regular rhythm. Normal rate present. Normal and normal S1 and S2    Gastrointestinal Abdomen soft and Non-tender  Musculoskeletal Normal spine  Lower Extremities Pulses 2+ and equal bilaterally and No edema  Skin Warm and dry and No rashes or lesions  Allergies  1.Lisinopril(Reaction:, Severity:Mild)  Medications (Info obtained by: Verbal)  1.aspirin 81 MG tablet, daily  2.atenoloL 25 mg tablet, Take 1 tablet orally once a day.  3.atorvastatin 40 mg tablet, Take 1 tablet orally once in the evening.  4.clonazePAM (KLONOPIN) 0.5 MG tablet, as needed  5.Eliquis 5 mg tablet, Take 1 tablet orally 2 times a day.  6.losartan 100 mg tablet, Take 1 tablet orally once a day.  7.metFORMIN (GLUCOPHAGE-XR) 500 MG 24 hr tablet  8.Multiple Vitamin (TAB-A-ZULAY) Tab, Take 1 tablet by mouth.  9.omega-3 acid ethyl esters (LOVAZA) 1 g capsule, TK 2 CS PO BID  10.PROzac 20 mg capsule, Take 1 capsule orally once a day.  Impression  1.PAF (paroxysmal atrial fibrillation)  2.History of CEA (carotid endarterectomy), right - Hx  3.PTCA with insertion of stent  4.Hypertension (HTN), primary  5.Asymptomatic carotid artery stenosis, bilateral  Assessment & Plan  Patient with prior coronary stenting with good LV function.  He has a history of carotid artery me.  Patient will need Dopplers.  He have a Lexiscan PET.  See me in a year.  Vies monitor his blood pressure.  Can continue regular exercise    Plan  1 Lexiscan PET  2.  Carotid Dopplers  3.  See me in a year  Medications Ordered  1.atenoloL 25 mg tablet, Take 1 tablet orally once a day.  2.atorvastatin 40 mg tablet, Take 1 tablet orally once in the evening.  3.Eliquis 5 mg tablet, Take 1 tablet orally 2 times a day.  4.losartan 100 mg tablet, Take 1 tablet orally once a day.  Labs and Diagnostics ordered  1.Cardiac PET/CT Scan (30955) (Schedule next available)  2.Carotid Ultrasound (bilateral) (Schedule next available)  Future  appointments  1.Referral Visit - Americo Smith ([email or web address]) : (Today)  2.Follow up visit - Ayo Rendon (1 Year)  Nurses documentation  Refills: none  Upcoming surgeries: none  Use of assistive device: none  EKG: no  (ANDREA SARKAR)  Patient instructions  Complete a cardiac PET scan  Complete a carotid ultrasound  Follow up with Dr. Rendon in 1 year    PET Perfusion study :   Your provider has ordered a nuclear PET perfusion study. This is a stress test for your heart. It will help detect the presence of blockages in your heart. Please refer to the Patient Guide for Perfusion Study for further information.             It is important not to have any caffeine for 12 hours before the test.  Caffeine includes soft drinks, tea, coffee - including decaffeinated products, chocolate, energy drinks, diet pills, Excedrin, Anacin, Butalbital, Fioricet, and some other migraine medications.             Do not have anything to eat or drink for 6-8 hours prior to your test.      Carotid ultrasound:   Your provider has ordered an ultrasound of the carotid arteries of your neck. This test will check to see if there are blockages in arteries of your neck leading up to the brain.  No special preparations are necessary; please no sweaters or turtle necks. This test takes approximately 20 to 30 minutes.     Diagnostics Details  ACTMonitoring 07/20/2022  1.This is a good quality study.    2.Predominant rhythm is normal sinus rhythm.    3.The minimum heart rate recorded was 31 beats / minute. The maximum heart rate is 143 beats / minute. The mean heart rate is 57 beats / minute.    4.-28% burden of AF. - ~3 second pauses noted during presumed hours of sleeping.    CPOE Orders carried out by: Ayo Rendon MD, Alexa Monae and Trupti Cartagena  Care Providers: Alexa Edwards MD and Trupti Cartagena  Electronically Authenticated by  Ayo Rendon MD  04/03/2023 01:12:27 PM  Disclaimer: Components of this note were documented  using voice recognition system and are subject to errors not corrected at proofreading. Contact the author of this note for any clarifications.

## 2024-04-14 NOTE — PLAN OF CARE
Pt A&Ox4. Denies pain at this time. Pt has dyspnea with activity. Pt did have light headedness when standing. Lungs dim on Ra. Afib on tele. NPO for cardioversion tomorrow. Consent signed. Call light and belongings within reach.        Problem: Diabetes/Glucose Control  Goal: Glucose maintained within prescribed range  Description: INTERVENTIONS:  - Monitor Blood Glucose as ordered  - Assess for signs and symptoms of hyperglycemia and hypoglycemia  - Administer ordered medications to maintain glucose within target range  - Assess barriers to adequate nutritional intake and initiate nutrition consult as needed  - Instruct patient on self management of diabetes  Outcome: Progressing     Problem: Patient/Family Goals  Goal: Patient/Family Long Term Goal  Description: Patient's Long Term Goal: stay out of hospital    Interventions:  - cardioversion  -EKG monitoring      - See additional Care Plan goals for specific interventions  Outcome: Progressing  Goal: Patient/Family Short Term Goal  Description: Patient's Short Term Goal: monitor rhythm    Interventions:   - take meds as prescribed  -echo  - See additional Care Plan goals for specific interventions  Outcome: Progressing

## 2024-04-14 NOTE — H&P
[facility]  [facility]     Bertrand Gonzalez Patient Status:  Inpatient    1960 MRN [id number]   [facility] 2NE-A Attending Adri Pickard MD   Hosp Day # 0 PCP Sarah Londono MD     Chief Complaint: fatigue    Subjective:    History of Present Illness:     Bertrand Gonzalez is a 63 year old male with medical history of CAD, hypothyroidism PAF, hypertension, hyperlipidemia, DM type II and GERD who comes to the ER with complaints of fatigue and persistent A.fib.  He reports that he usually gets about 2 episodes of A.fib  a year which last about 36 hours .  He works out hard and his states it converts him to a normal rhythm then.  Over the past 17 days he reports he has been in A.fib and has been feeling very fatigued. He biked about 20 miles yesterday and was feeling not right afterwards.  He reporst that his HR is not over 70s even with exercise.  He went on the rower today and after 30 minutes started to feel off again.  He was weak and his vision was starting to turn black.  He rested on a recliner but when he tried to get up he continues to feel off so he came to the ER for further evaluation.  He denies headache, tingling, numbness, chest pain, shortness of breath, or palpitations.     History/Other:    Past Medical History:  Past Medical History:    Actinic keratosis    Acute, but ill-defined, cerebrovascular disease    ADD (attention deficit disorder)    Allergic rhinitis due to pollen    Allergic rhinitis, cause unspecified    Anxiety    Arrhythmia    Atherosclerosis of coronary artery    Atrial fibrillation (HCC)    CAD (coronary artery disease)    stent    Carotid stenosis    Coronary atherosclerosis    Coronary atherosclerosis of native coronary artery    Decorative tattoo    Diabetes (HCC)    Disorder of prostate    Disorder of thyroid    Easy bruising    Esophageal reflux    Essential hypertension    Essential hypertension, benign    Frequent urination    Gout     Heartburn    Hemorrhoids    High blood pressure    High cholesterol    Hyperlipidemia    Hyperthyroidism    Hypertrophy of prostate with urinary obstruction and other lower urinary tract symptoms (LUTS)    Impaired fasting glucose    Nocturia    Obesity    TAO (obstructive sleep apnea)    AHI 17.9 REM AHI 22.7 Supine AHI 71.5 non-supine AHI 13.7 Sao2 Brad 84.9%     Pure hypercholesterolemia    Reflux esophagitis    Sleep apnea    Stented coronary artery    Stress    Unspecified hypothyroidism    Wears glasses     Past Surgical History:   Past Surgical History:   Procedure Laterality Date    Angioplasty (coronary)      dr leblanc;has stents     Carotid endarterectomy  5 yrs ago    5 yrs ago per pt    Colonoscopy      Other surgical history        Family History:   Family History   Problem Relation Age of Onset    Other (GI cancer[other]) Other         granfather, live GB panc    Heart Attack Other         fam hx    Depression Other         fam hx    Alcohol and Other Disorders Associated Other         fam hx    High Cholesterol Other         fam hx    Stroke Father     Cancer Maternal Grandfather     Heart Disease Brother     Dementia Mother      Social History:    reports that he quit smoking about 16 years ago. His smoking use included cigarettes. He started smoking about 48 years ago. He has quit using smokeless tobacco. He reports that he does not currently use alcohol. He reports current drug use. Frequency: 1.00 time per week. Drug: Cannabis.     Allergies:   Allergies   Allergen Reactions    Penicillin V Potassium        Medications:    No current facility-administered medications on file prior to encounter.     Current Outpatient Medications on File Prior to Encounter   Medication Sig Dispense Refill    levothyroxine 50 MCG Oral Tab TAKE 1 TABLET(50 MCG) BY MOUTH BEFORE BREAKFAST 45 tablet 0    amLODIPine 5 MG Oral Tab Take 1 tablet (5 mg total) by mouth daily. 90 tablet 0    omega-3-acid ethyl esters 1 g  Oral Cap Take 2 capsules (2 g total) by mouth 2 (two) times daily. 360 capsule 0    metFORMIN  MG Oral Tablet 24 Hr Take 1 tablet (500 mg total) by mouth daily. 90 tablet 0    losartan 100 MG Oral Tab Take 1 tablet (100 mg total) by mouth daily. 90 tablet 2    atorvastatin 40 MG Oral Tab Take 1 tablet (40 mg total) by mouth nightly. 90 tablet 2    apixaban 5 MG Oral Tab Take 1 tablet (5 mg total) by mouth 2 (two) times daily. 60 tablet 3    aspirin 81 MG Oral Tab EC Take 1 tablet (81 mg total) by mouth daily.      Nutritional Supplements (PROSTATE ENZYME FORMULA OR) Take 1 tablet by mouth daily. Super beta prostate      Multiple Vitamin (TAB-A-ZULAY) Oral Tab Take 1 tablet by mouth daily.      Glucosa-Chondr-Na Chondr--106-292-83 MG Oral Tab Take 1 tablet by mouth in the morning and 1 tablet before bedtime.      atenolol 25 MG Oral Tab Take 1 tablet (25 mg total) by mouth daily. 90 tablet 1    FLUoxetine HCl 20 MG Oral Cap Take 1 capsule (20 mg total) by mouth daily. 30 capsule 1    ClonazePAM 0.5 MG Oral Tab Take 1 tablet (0.5 mg total) by mouth nightly as needed for Anxiety. 30 tablet 1       Review of Systems:   A comprehensive review of systems was completed.    Pertinent positives and negatives noted in the HPI.    Objective:   Physical Exam:    /81 (BP Location: Left arm)   Pulse 79   Temp 98 °F (36.7 °C) (Temporal)   Resp 10   Ht 5' 11\" (1.803 m)   Wt 239 lb 3.2 oz (108.5 kg)   SpO2 94%   BMI 33.36 kg/m²   General: No acute distress, Alert  Respiratory: No rhonchi, no wheezes  Cardiovascular: irreg irreg  Abdomen: Soft, Non-tender, non-distended, positive bowel sounds  Neuro: No new focal deficits  Extremities: No edema      Results:    Labs:      Labs Last 24 Hours:    Recent Labs   Lab 04/14/24  0818   RBC 4.88   HGB 14.4   HCT 41.4   MCV 84.8   MCH 29.5   MCHC 34.8   RDW 12.6   NEPRELIM 4.11   WBC 8.0   .0       Recent Labs   Lab 04/14/24  0818   *   BUN 24*    CREATSERUM 1.21   EGFRCR 66   CA 9.4   ALB 4.0      K 4.3      CO2 24.0   ALKPHO 70   AST 19   ALT 29   BILT 0.6   TP 7.0       Lab Results   Component Value Date    PT 13.9 09/19/2012    PT 14.0 09/18/2012    INR 1.07 04/14/2021    INR 1.11 01/18/2018    INR 1.10 09/19/2012       Recent Labs   Lab 04/14/24  0818   TROPHS 4       Recent Labs   Lab 04/14/24  0818   PBNP 278*       No results for input(s): \"PCT\" in the last 168 hours.    Imaging: Imaging data reviewed in Epic.    Assessment & Plan:      #A.fib, rate controlled  -symptomatic  -continue atenolol and eliquis  -cardiology to see, may need cardioversion    #DM type II  -hold PTA metformin  -hyperglycemia protocol    #Hypothyroidism  -resume levothyroxine    #Hypertension  -resume PTA amlodipine, losartan      Plan of care discussed with patient and family    Rosio Martinez MD    Supplementary Documentation:     The 21st Century Cures Act makes medical notes like these available to patients in the interest of transparency. Please be advised this is a medical document. Medical documents are intended to carry relevant information, facts as evident, and the clinical opinion of the practitioner. The medical note is intended as peer to peer communication and may appear blunt or direct. It is written in medical language and may contain abbreviations or verbiage that are unfamiliar.               **Certification      PHYSICIAN Certification of Need for Inpatient Hospitalization - Initial Certification    Patient will require inpatient services that will reasonably be expected to span two midnight's based on the clinical documentation in H+P.   Based on patients current state of illness, I anticipate that, after discharge, patient will require TBD.

## 2024-04-14 NOTE — HISTORICAL OFFICE NOTE
[facility]  [address] [place] [address], 4th floor, [place] [address]  [phone]      Bertrand Gonzalez  Progress Note  Demographics:  Name: Bertrand Gonzalez YOB: 1960  Age: 62, Male Medical Record No: [id number]  Visited Date/Time: 07/24/2023 11:30 AM    Chief Complaints  8 month follow up  History of Present Illness  Bertrand Gonzalez is a a(n) 62 year old male, works at [employer], presented with palpitations, his Apple Watch alerted him he was in atrial fibrillation. No chest pain, no dizziness or syncope.     Prior history of PCI, recent stress test was normal, no ichemia. Echo showed normal LVEF, moderate LAE.      He has TAO, has had trouble tolerating CPAP.    He had COVID last month, he is vaccinated.     ECG today shows SR, rate 40s.    He has asymptomatic sinus bradycardia, exercises regularly. Uses a rowing machine, bikes regularly.   Cardiac risk factors Hypertension, Dyslipidemia and Former smoker  Past Medical History  1.PAF (paroxysmal atrial fibrillation)  2.Coronary artery calcification seen on CT scan  3.Hypertension (HTN), primary  4.Mixed hyperlipidemia  5.Asymptomatic carotid artery stenosis, bilateral  6.Coronary artery disease involving native coronary artery of native heart without angina pectoris  7.IFG (impaired fasting glucose)  Past Surgical History  1.History of CEA (carotid endarterectomy), right - Hx  2.PTCA with insertion of stent  Family History  1. Father Age 64 - Stroke (Reason for death)  2. Mother Age 85 - Unspecified dementia with behavioral disturbance (Reason for death)  Social History  Smoking status Former smoker  Tobacco usage - No (Non-smoker (finding))  Review of systems  Cardiovascular No history of Chest pain, AGUIAR, Palpitations, Syncope, PND, Orthopnea, Edema and Claudication  Physical Examination  Vitals Right Arm Sitting  / 68 mmHg, Pulse rate 46 bpm, Height in 5' 10\", BMI: 35.7, Weight in 249 lbs (or) 113 kgs and BSA  : 2.4 cc/m²  General Appearance No Acute Distress  Head/Eyes/Ears/Nose/Mouth/Throat Conjunctiva pink, Sclera Clear and Mucous membranes Moist  Neck Normal carotid pulsations, No carotid bruits and No JVD  Respiratory Unlabored, Lungs clear with normal breath sounds and Equal bilaterally  Cardiovascular Intact distal pulses and Regular rhythm. Normal rate present. Normal and normal S1 and S2    Allergies  1.Lisinopril(Reaction:, Severity:Mild)  Medications  1.aspirin 81 MG tablet, daily  2.atenoloL 25 mg tablet, Take 1 tablet orally once a day.  3.atorvastatin 40 mg tablet, Take 1 tablet orally once in the evening.  4.clonazePAM (KLONOPIN) 0.5 MG tablet, as needed  5.Eliquis 5 mg tablet, Take 1 tablet orally 2 times a day.  6.losartan 100 mg tablet, Take 1 tablet orally once a day.  7.metFORMIN (GLUCOPHAGE-XR) 500 MG 24 hr tablet  8.Multiple Vitamin (TAB-A-ZULAY) Tab, Take 1 tablet by mouth.  9.omega-3 acid ethyl esters (LOVAZA) 1 g capsule, TK 2 CS PO BID  10.PROzac 20 mg capsule, Take 1 capsule orally once a day.  Impression  1.PAF (paroxysmal atrial fibrillation)  2.History of CEA (carotid endarterectomy), right - Hx  3.PTCA with insertion of stent  4.Hypertension (HTN), primary  5.Asymptomatic carotid artery stenosis, bilateral  Assessment & Plan  63 y/o male with NVAF, CHADSVasc score 2 (HTN, Vasc). He reports an episode of AF in June that lasted about 48 hours. MCT showed 28% AF burden (last year).     He continues to exercise, rides his bike, does rowing.     - Continue current medications.   - No indication for ppm with asymptomatic bradycardia. Peak HR is just above 100 bpm with exercise.   -.  He tracks AF with his Apple Watch, one episode that lasted 48 hours in June.   - Possible anti-arrhythmic options limited by bradycardia, PCI.   - Prior to next visit, MCT for 7 days is necessary to evaluate symptoms that are not happening on a daily basis including arrhythmia frequency and burden to help guide  treatment recommendations.    - Follow up in 6 months  - Started discussion for ablation, he would like to think it over.     Increased BMI: Provide patient with information regarding diet and lifestyle changes.      Labs and Diagnostics ordered  1.EKG (electrocardiogram) (Today)  2.Monitor - MCT/Telemetry (6 Months)  Future appointments  1.Follow up visit - Terrell Ambrocio (6 Months)  Nurses documentation  Triage - Nursing Doc:  Upcoming surgeries: no   Use of assistive devices(s): no   Refills: no  EKG: yes  Triage & medication list reviewed by: MM   Patient instructions  Schedule a 7 day MCT prior to next visit  Follow up with Dr. Ambrocio in 6 months    MCT Black Cases:  Your provider has ordered a telemetry/event monitor to be worn for 7 days.  This will assess your heart rate and rhythm.  In order for the monitor to work, you must keep the phone charged and within 10 feet of monitor you are wearing.  You will have 2 monitors.  One monitor will be charging while wearing the other monitor.   The only time the monitor is to be removed is when you are changing out the monitor.  The Drip In Mini monitor is waterproof.  Please refer to instruction sheet given to you by the staff, or the instruction manual in your black case. Please call our office at [phone] in the event that you need assistance     Diagnostics Details  Carotid Ultrasound 06/09/2023  1.The study quality is good.    2.Patent Right carotid endarterectomy with 1-39% stenosis in the proximal right internal carotid artery based on Bluth Criteria.    3.1-39% stenosis in the proximal left internal carotid artery based on Bluth Criteria.    4.Antegrade right vertebral artery flow.    5.Antegrade left vertebral artery flow.    Nuclear PET 06/07/2023  1.Stress EKG is normal.    1.This is a normal perfusion study, no perfusion defects noted.    2.The left ventricular cavity is noted to be normal on the stress studies. The stress left ventricular ejection  fraction was calculated to be 45% and left ventricular global function is mildly reduced. The rest left ventricular cavity is noted to be normal. The rest left ventricular ejection fraction was calculated to be 43% and rest left ventricular global function is mildly reduced. (The gated findings may underestimate the true ventricular function given the baseline atrial fibrillation)    ACTMonitoring 07/20/2022  1.This is a good quality study.    2.Predominant rhythm is normal sinus rhythm.    3.The minimum heart rate recorded was 31 beats / minute. The maximum heart rate is 143 beats / minute. The mean heart rate is 57 beats / minute.    4.-28% burden of AF. - ~3 second pauses noted during presumed hours of sleeping.    CPOE Orders carried out by: Indiana Bowen and Vandana Shah CMA  Care Providers: Terrell Ambrocio MD, Indiana Bowen and Vandana Shah CMA  Electronically Authenticated by  Terrell Ambrocio MD  07/26/2023 04:58:48 PM  Disclaimer: Components of this note were documented using voice recognition system and are subject to errors not corrected at proofreading. Contact the author of this note for any clarifications.   Azelaic Acid Pregnancy And Lactation Text: This medication is considered safe during pregnancy and breast feeding.

## 2024-04-14 NOTE — ED INITIAL ASSESSMENT (HPI)
Got up this morning with weakness and almost passed out. No energy. With beverly. Scheduled to see his cariology tomorrow. Hx of afib

## 2024-04-14 NOTE — ED QUICK NOTES
Orders for admission, patient is aware of plan and ready to go upstairs. Any questions, please call ED RN cari at extension [phone].     Patient Covid vaccination status: Fully vaccinated     COVID Test Ordered in ED: SARS-CoV-2 by PCR (GENEXPERT)    COVID Suspicion at Admission: N/A    Running Infusions:  None    Mental Status/LOC at time of transport: aox4    Other pertinent information:   CIWA score: N/A   NIH score:  N/A

## 2024-04-14 NOTE — ED PROVIDER NOTES
Patient Seen in: [facility] Emergency Department      History     Chief Complaint   Patient presents with    Fatigue    Difficulty Breathing     Stated Complaint: RUBY,weakness    Subjective:   HPI    63-year-old male presents to the emergency department complaining of overall fatigue, generalized weakness and shortness of breath primarily with exertion but no chest pain over the past couple of weeks.  The patient states that he has a history of paroxysmal atrial fibrillation and is anticoagulated on beta-blockers.  He has been in persistent A-fib for at least 2-1/2 weeks.  Typically his episodes will last for 24 to 48 hours and he has 2 or 3 episodes a year for the past couple of years.  He is scheduled to see [facility] cardiologist Dr. Ayo Rendon tomorrow.  He denies any other physical complaints.  He does exercise and went for a 20 mile bike ride yesterday and used a rowing machine for 30 minutes today working up a sweat but he felt very lightheaded as though he was going to pass out when he got off of the machine.    Objective:   Past Medical History:    Actinic keratosis    Acute, but ill-defined, cerebrovascular disease    ADD (attention deficit disorder)    Allergic rhinitis due to pollen    Allergic rhinitis, cause unspecified    Anxiety    Atherosclerosis of coronary artery    Atrial fibrillation (HCC)    CAD (coronary artery disease)    stent    Carotid stenosis    Coronary atherosclerosis of native coronary artery    Decorative tattoo    Diabetes (HCC)    Disorder of prostate    Easy bruising    Esophageal reflux    Essential hypertension    Essential hypertension, benign    Frequent urination    Gout    Heartburn    Hemorrhoids    Hyperlipidemia    Hyperthyroidism    Hypertrophy of prostate with urinary obstruction and other lower urinary tract symptoms (LUTS)    Impaired fasting glucose    Nocturia    Obesity    TAO (obstructive sleep apnea)    AHI 17.9 REM AHI 22.7 Supine AHI 71.5 non-supine AHI 13.7  Sao2 Brad 84.9%     Pure hypercholesterolemia    Reflux esophagitis    Sleep apnea    Stented coronary artery    Stress    Unspecified hypothyroidism    Wears glasses              Past Surgical History:   Procedure Laterality Date    Angioplasty (coronary)      dr leblanc;has stents     Carotid endarterectomy  5 yrs ago    5 yrs ago per pt    Colonoscopy      Other surgical history                  Social History     Socioeconomic History    Marital status:     Number of children: 3   Occupational History    Occupation:    Tobacco Use    Smoking status: Former     Current packs/day: 0.00     Types: Cigarettes     Start date: 1976     Quit date: 2008     Years since quittin.2    Smokeless tobacco: Former    Tobacco comments:     socail smoker   Vaping Use    Vaping status: Never Used   Substance and Sexual Activity    Alcohol use: Yes     Alcohol/week: 10.0 standard drinks of alcohol     Types: 5 Cans of beer, 5 Shots of liquor per week     Comment: Have stopped due to triggering afib    Drug use: Yes     Frequency: 1.0 times per week     Types: Cannabis     Comment: occassionally ediables    Other Topics Concern     Service Yes    Blood Transfusions No    Caffeine Concern No    Occupational Exposure No    Hobby Hazards No    Sleep Concern Yes     Comment: using melatonin    Stress Concern No    Weight Concern Yes    Special Diet No    Back Care No    Exercise Yes    Bike Helmet Yes    Seat Belt Yes    Self-Exams No   Social History Narrative     with camille. , 3 children.      Social Determinants of Health     Physical Activity: Sufficiently Active (3/6/2020)    Received from [employer], [facility]    Exercise Vital Sign     Days of Exercise per Week: 3 days     Minutes of Exercise per Session: 50 min              Review of Systems    Positive for stated complaint: RUBY,weakness  Other systems are as noted in HPI.  Constitutional and  vital signs reviewed.      All other systems reviewed and negative except as noted above.    Physical Exam     ED Triage Vitals [04/14/24 0813]   /83   Pulse 62   Resp 14   Temp 98 °F (36.7 °C)   Temp src Temporal   SpO2 99 %   O2 Device None (Room air)       Current:BP (!) 127/92   Pulse 82   Temp 98 °F (36.7 °C) (Temporal)   Resp 10   Ht 180.3 cm (5' 11\")   Wt 107.5 kg   SpO2 97%   BMI 33.05 kg/m²         Physical Exam    General appearance: This is a middle-aged male lying on a gurney.  Vital signs were reviewed per nurses notes.  Monitor reveals atrial fibrillation rate in the 70s.  Initial blood pressure was 124/79.  HEENT: Normocephalic atraumatic.  Anicteric sclera.  Oral mucosa is moist.  Oropharynx is normal.  Neck: No adenopathy or thyromegaly.  Lungs are clear to auscultation.  Heart exam: Normal S1-S2 without extra sounds or murmurs.  Regular rate but irregularly irregular rhythm.  Abdomen is nontender.  Extremities are atraumatic.  Skin is dry without rashes or lesions.  Neuroexam: Alert and oriented x 4.  Speech is fluent.  Moving all 4 extremities well.    ED Course     Labs Reviewed   COMP METABOLIC PANEL (14) - Abnormal; Notable for the following components:       Result Value    Glucose 132 (*)     BUN 24 (*)     All other components within normal limits   PRO BETA NATRIURETIC PEPTIDE - Abnormal; Notable for the following components:    Pro-Beta Natriuretic Peptide 278 (*)     All other components within normal limits   TROPONIN I HIGH SENSITIVITY - Normal   SARS-COV-2 BY PCR (GENEXPERT) - Normal   CBC WITH DIFFERENTIAL WITH PLATELET    Narrative:     The following orders were created for panel order CBC With Differential With Platelet.  Procedure                               Abnormality         Status                     ---------                               -----------         ------                     CBC W/ DIFFERENTIAL[[id number]]                              Final result                  Please view results for these tests on the individual orders.   RAINBOW DRAW LAVENDER   RAINBOW DRAW LIGHT GREEN   RAINBOW DRAW BLUE   CBC W/ DIFFERENTIAL     EKG    Rate, intervals and axes as noted on EKG Report.  Rate: 74  Rhythm: Atrial Fibrillation  Reading: Premature ventricular or aberrantly conducted complexes.  Abnormal EKG.  Agree with EKG report.                 Intravenous access was obtained.  Laboratory studies were drawn.    XR CHEST AP PORTABLE  (CPT=71045)    Result Date: 4/14/2024            PROCEDURE:  XR CHEST AP PORTABLE  (CPT=71045)  TECHNIQUE:  AP chest radiograph was obtained.  COMPARISON:  [facility] , XR, XR CHEST AP PORTABLE  (CPT=71045), 12/23/2021, 12:12 PM.  INDICATIONS:  RUBY,weakness  PATIENT STATED HISTORY: (As transcribed by Technologist)  Patient has afib and almost passed out this morning.    FINDINGS: Cardiac silhouette and pulmonary vasculature are unremarkable. No consolidation, pleural effusion or pneumothorax. IMPRESSION: Unremarkable portable chest radiograph.   LOCATION:  [facility]      Dictated by (CST): Otf Street MD on 4/14/2024 at 8:45 AM     Finalized by (CST): Otf Street MD on 4/14/2024 at 8:45 AM       I personally reviewed the images myself and went over results with patient.    I viewed the chest x-ray films myself.  There is no acute cardiopulmonary abnormality.    The patient remained hemodynamically stable throughout the course the emergency department stay.  Case was discussed with [facility] hospitalist Dr. Denia Martinez and TACHO VICTORIA on-call.  Patient will be admitted for cardiac evaluation and possible cardioversion tomorrow.    Test results and treatment plan were discussed with the patient and his daughter prior to admission.         MDM      #1.  Atrial fibrillation with controlled ventricular response.  The patient exercises vigorously and is not tolerating the lack of an atrial kick due to chronic atrial fibrillation.  Currently  anticoagulated.  Admitted for evaluation of possible cardioversion.  2.  Generalized fatigue secondary to #1.  3.  Anticoagulated.  Admission disposition: 4/14/2024  9:37 AM                                        Medical Decision Making      Disposition and Plan     Clinical Impression:  1. Atrial fibrillation with controlled ventricular response (HCC)    2. Other fatigue    3. Anticoagulated         Disposition:  Admit  4/14/2024  9:37 am    Follow-up:  No follow-up provider specified.        Medications Prescribed:  Current Discharge Medication List                            Hospital Problems       Present on Admission  Date Reviewed: 1/14/2024            ICD-10-CM Noted POA    * (Principal) Atrial fibrillation with controlled ventricular response (HCC) I48.91 4/14/2024 Unknown

## 2024-04-14 NOTE — ED QUICK NOTES
PT AMBULATED WELL TO BATHROOM AND BACK TO A5. SIDE RAILS UP, BACK ON MONITOR, CALL LIGHT WITH IN REACH, FAMILY AT BEDSIDE, VS DONE.

## 2024-04-14 NOTE — PROGRESS NOTES
62 y/o male with hx PCI 8 years ago, HTN, DM, PAF, normal EF with afib and near syncope. Echo today. Cardioversion tomorrow.

## 2024-04-14 NOTE — PROGRESS NOTES
04/14/24 1041 04/14/24 1043 04/14/24 1045   Vital Signs   Pulse 69 78 79   BP (!) 130/94 (!) 124/98 117/81   MAP (mmHg) (!) 104 (!) 108 93   BP Location Left arm Left arm Left arm   BP Method Automatic Automatic Automatic   Patient Position Lying Sitting Standing     Orthostatic BP completed. Pt stated he did feel dizzy when going from sitting to standing position.

## 2024-04-15 ENCOUNTER — APPOINTMENT (OUTPATIENT)
Dept: INTERVENTIONAL RADIOLOGY/VASCULAR | Facility: HOSPITAL | Age: 64
End: 2024-04-15
Payer: COMMERCIAL

## 2024-04-15 ENCOUNTER — APPOINTMENT (OUTPATIENT)
Dept: CV DIAGNOSTICS | Facility: HOSPITAL | Age: 64
End: 2024-04-15
Payer: COMMERCIAL

## 2024-04-15 VITALS
BODY MASS INDEX: 33.48 KG/M2 | TEMPERATURE: 98 F | DIASTOLIC BLOOD PRESSURE: 64 MMHG | WEIGHT: 239.19 LBS | OXYGEN SATURATION: 97 % | HEART RATE: 53 BPM | RESPIRATION RATE: 16 BRPM | SYSTOLIC BLOOD PRESSURE: 118 MMHG | HEIGHT: 71 IN

## 2024-04-15 LAB
ANION GAP SERPL CALC-SCNC: 6 MMOL/L (ref 0–18)
ATRIAL RATE: 46 BPM
BUN BLD-MCNC: 16 MG/DL (ref 9–23)
CALCIUM BLD-MCNC: 9.5 MG/DL (ref 8.5–10.1)
CHLORIDE SERPL-SCNC: 108 MMOL/L (ref 98–112)
CO2 SERPL-SCNC: 26 MMOL/L (ref 21–32)
CREAT BLD-MCNC: 1.24 MG/DL
EGFRCR SERPLBLD CKD-EPI 2021: 65 ML/MIN/1.73M2 (ref 60–?)
GLUCOSE BLD-MCNC: 142 MG/DL (ref 70–99)
GLUCOSE BLD-MCNC: 143 MG/DL (ref 70–99)
OSMOLALITY SERPL CALC.SUM OF ELEC: 294 MOSM/KG (ref 275–295)
P AXIS: 55 DEGREES
P-R INTERVAL: 186 MS
POTASSIUM SERPL-SCNC: 4.3 MMOL/L (ref 3.5–5.1)
Q-T INTERVAL: 454 MS
QRS DURATION: 88 MS
QTC CALCULATION (BEZET): 397 MS
R AXIS: -10 DEGREES
SODIUM SERPL-SCNC: 140 MMOL/L (ref 136–145)
T AXIS: 15 DEGREES
VENTRICULAR RATE: 46 BPM

## 2024-04-15 PROCEDURE — 93306 TTE W/DOPPLER COMPLETE: CPT

## 2024-04-15 PROCEDURE — 5A2204Z RESTORATION OF CARDIAC RHYTHM, SINGLE: ICD-10-PCS | Performed by: INTERNAL MEDICINE

## 2024-04-15 PROCEDURE — 99239 HOSP IP/OBS DSCHRG MGMT >30: CPT | Performed by: HOSPITALIST

## 2024-04-15 RX ORDER — METHOHEXITAL IN WATER/PF 100MG/10ML
SYRINGE (ML) INTRAVENOUS
Status: COMPLETED
Start: 2024-04-15 | End: 2024-04-15

## 2024-04-15 NOTE — PROGRESS NOTES
[facility] Cardiology Progress Note    Bertrand Gonzalez Patient Status:  Inpatient    1960 MRN [id number]   Location [facility] 2NE-A Attending Adri Pickard MD   Hosp Day # 1 PCP Sarah Londono MD     Subjective:  No acute events overnight  No dizziness today.     Objective:  /90 (BP Location: Left arm)   Pulse 63   Temp 98.3 °F (36.8 °C) (Oral)   Resp 18   Ht 5' 11\" (1.803 m)   Wt 239 lb 3.2 oz (108.5 kg)   SpO2 96%   BMI 33.36 kg/m²     Telemetry: Afib      Intake/Output:    Intake/Output Summary (Last 24 hours) at 4/15/2024 0932  Last data filed at 4/15/2024 0801  Gross per 24 hour   Intake 480 ml   Output 2900 ml   Net -2420 ml       Last 3 Weights   24 1040 239 lb 3.2 oz (108.5 kg)   24 0813 237 lb (107.5 kg)   23 0809 232 lb (105.2 kg)   23 0828 248 lb (112.5 kg)       Labs:  Recent Labs   Lab 24  0818 04/15/24  0545   * 143*   BUN 24* 16   CREATSERUM 1.21 1.24   EGFRCR 66 65   CA 9.4 9.5    140   K 4.3 4.3    108   CO2 24.0 26.0     Recent Labs   Lab 24  0818   RBC 4.88   HGB 14.4   HCT 41.4   MCV 84.8   MCH 29.5   MCHC 34.8   RDW 12.6   NEPRELIM 4.11   WBC 8.0   .0         Recent Labs   Lab 24  0818   TROPHS 4       Diagnostics:             Physical Exam:    Physical Exam  Cardiovascular:      Rate and Rhythm: Normal rate. Rhythm irregular.   Pulmonary:      Effort: Pulmonary effort is normal.      Breath sounds: No wheezing or rales.   Abdominal:      Palpations: Abdomen is soft.   Musculoskeletal:      Right lower leg: No edema.      Left lower leg: No edema.   Neurological:      Mental Status: He is alert and oriented to person, place, and time.         Medications:   amLODIPine  5 mg Oral Daily    apixaban  5 mg Oral BID    aspirin  81 mg Oral Daily    atenolol  25 mg Oral Daily Beta Blocker    atorvastatin  40 mg Oral Daily    FLUoxetine  20 mg Oral Daily    levothyroxine  50 mcg Oral Before  breakfast    losartan  100 mg Oral Daily    multivitamin  1 tablet Oral Daily      sodium chloride         Assessment:   62yo presenting near syncope and more frequent Afib episodes.     Paroxysmal Afib: ? Symptomatic, but interestingly had been in afib for 17 days with controlled HR, but had near syncope just recently after long car ride and golf game ? Mildly dry syncope on presentation, tsh wnl, historically preserved EF, compliant with apixiban  CAD:   HTN: fair controll, follow outpt  HL: statin  DM2    Plan:    DC home pending successful DCCV.   Follow up in afib clinic to discuss ablation vs antiarrythmic in future.   Outpt sleep study     Gabrielle Thorpe, APRN  4/15/2024  9:32 AM  Ph [phone] ([place])  Ph [phone] ([place])      ____________________________  Pt seen and examined and discussed with the APN.    Tele: AF    Exam  General- awake alert  HEENT- PEERLA  Neck- JVP normal  CV- irreg irreg  Lungs- CTA  Extremities- no edema  Neuro- Normal  Psych- mood/affect congruent  Skin- no lesions    I have personally performed the medical decision making in its entirety. My additions include:  Plan  - DCCV today. Verified Uninterrupted OAC > 4 weeks  - outpt discussion for AAD /Ablation    R3    ____________________________  Rose Lance MD, FACC, FHRS  Cardiac Electrophysiololgy  [facility]  4/15/2024

## 2024-04-15 NOTE — PROCEDURES
Electrophysiology Procedure Note    Bertrand Gonzalez Location: Cath Lab   CSN [id number] MRN [id number]   Admission Date 4/14/2024  Operation Date 04/15/24    Attending Physician Rose Lance MD Operating Physician Rose Lance MD     Pre-Operative Diagnosis: af    Post-Operative Diagnosis: Same as above  PROCEDURE(S) PERFORMED:    1.    Cardioversion.  2.     Sedation      :  Rose Lance MD     ANESTHESIA:  IV sedation.  Moderate conscious sedation for this procedure was administered and personally monitored. Brevital 60 mg  Sedation time: 10 MIN     INDICATION:  Persistent Atrial Fibrillation     COMPLICATIONS:  None.     METHODS:  The patient was brought to the outpatient cardiac telemetry unit in a fasting and nonsedated state after providing informed consent.  IV sedation was administered during continuous ECG, pulse oximeter and noninvasive hemodynamic monitoring.  After administering IV Brevital in intermittent boluses, the desired level of sedation was achieved.      Cardioversion Energy:  200J    Pad Position: [place]  Pre- Cardioversion Rhythm- aFIB  Post Cardioversion Rhythm - sINSU RASHMI     CONCLUSIONS:  1.    Successful cardioversion       Plan:  1- Rhythm/Rate Control Meds: no changes  2) Anticoagulation- no changes  3) Follow Up- 1 month- Dr. Cristóbal Lance MD     Cardiac Electrophysiololgy  [facility]

## 2024-04-15 NOTE — PLAN OF CARE
Patient AOX4. O2 sat > 90% on room air. A-fib on tele, rate controlled. VSS. Denies pain. Eliquis as ordered. Plan for 2D echo, cardioversion in a.m. NPO after midnight. Patient updated on plan of care.     Problem: Diabetes/Glucose Control  Goal: Glucose maintained within prescribed range  Description: INTERVENTIONS:  - Monitor Blood Glucose as ordered  - Assess for signs and symptoms of hyperglycemia and hypoglycemia  - Administer ordered medications to maintain glucose within target range  - Assess barriers to adequate nutritional intake and initiate nutrition consult as needed  - Instruct patient on self management of diabetes  4/14/2024 2224 by Shae Brady RN  Outcome: Progressing  4/14/2024 2224 by Shae Brady RN  Outcome: Progressing  4/14/2024 2224 by Shae Brady RN  Outcome: Progressing     Problem: Patient/Family Goals  Goal: Patient/Family Long Term Goal  Description: Patient's Long Term Goal: stay out of hospital    Interventions:  - cardioversion  -EKG monitoring      - See additional Care Plan goals for specific interventions  4/14/2024 2224 by Shae Brady RN  Outcome: Progressing  4/14/2024 2224 by Shae Brady RN  Outcome: Progressing  4/14/2024 2224 by Shae Brady RN  Outcome: Progressing  Goal: Patient/Family Short Term Goal  Description: Patient's Short Term Goal: monitor rhythm    Interventions:   - take meds as prescribed  -echo  - See additional Care Plan goals for specific interventions  4/14/2024 2224 by Shae Brady RN  Outcome: Progressing  4/14/2024 2224 by Shae Brady RN  Outcome: Progressing  4/14/2024 2224 by Shae Brady RN  Outcome: Progressing     Problem: CARDIOVASCULAR - ADULT  Goal: Maintains optimal cardiac output and hemodynamic stability  Description: INTERVENTIONS:  - Monitor vital signs, rhythm, and trends  - Monitor for bleeding, hypotension and signs of decreased cardiac output  - Evaluate effectiveness of  vasoactive medications to optimize hemodynamic stability  - Monitor arterial and/or venous puncture sites for bleeding and/or hematoma  - Assess quality of pulses, skin color and temperature  - Assess for signs of decreased coronary artery perfusion - ex. Angina  - Evaluate fluid balance, assess for edema, trend weights  Outcome: Progressing  Goal: Absence of cardiac arrhythmias or at baseline  Description: INTERVENTIONS:  - Continuous cardiac monitoring, monitor vital signs, obtain 12 lead EKG if indicated  - Evaluate effectiveness of antiarrhythmic and heart rate control medications as ordered  - Initiate emergency measures for life threatening arrhythmias  - Monitor electrolytes and administer replacement therapy as ordered  Outcome: Progressing

## 2024-04-15 NOTE — PROGRESS NOTES
Pt post cardioversion  TOat 1440- after adequate sedation per Dr. Lance, pt cardioverted x 1 at 200J. Pt converted to NSR per EKG, vss. Pt tolerated well.    Pt awake, tolerating po intake.    Recovery complete per protocol. Vss. Report given to humberto Ramos transferred to [id number].

## 2024-04-15 NOTE — PLAN OF CARE
Patient alert and oriented x 4. On RA. Up ad froylan. Afib on tele. Continent of bowel/bladder. No complaints of pain, shortness of breath, chest pain/discomfort. POC: cardioversion today, possible discharge after?. Call light within reach. Fall precautions in place.     Problem: Diabetes/Glucose Control  Goal: Glucose maintained within prescribed range  Description: INTERVENTIONS:  - Monitor Blood Glucose as ordered  - Assess for signs and symptoms of hyperglycemia and hypoglycemia  - Administer ordered medications to maintain glucose within target range  - Assess barriers to adequate nutritional intake and initiate nutrition consult as needed  - Instruct patient on self management of diabetes  Outcome: Progressing     Problem: Patient/Family Goals  Goal: Patient/Family Long Term Goal  Description: Patient's Long Term Goal: stay out of hospital    Interventions:  - cardioversion  -EKG monitoring      - See additional Care Plan goals for specific interventions  Outcome: Progressing  Goal: Patient/Family Short Term Goal  Description: Patient's Short Term Goal: monitor rhythm    Interventions:   - take meds as prescribed  -echo  - See additional Care Plan goals for specific interventions  Outcome: Progressing     Problem: CARDIOVASCULAR - ADULT  Goal: Maintains optimal cardiac output and hemodynamic stability  Description: INTERVENTIONS:  - Monitor vital signs, rhythm, and trends  - Monitor for bleeding, hypotension and signs of decreased cardiac output  - Evaluate effectiveness of vasoactive medications to optimize hemodynamic stability  - Monitor arterial and/or venous puncture sites for bleeding and/or hematoma  - Assess quality of pulses, skin color and temperature  - Assess for signs of decreased coronary artery perfusion - ex. Angina  - Evaluate fluid balance, assess for edema, trend weights  Outcome: Progressing  Goal: Absence of cardiac arrhythmias or at baseline  Description: INTERVENTIONS:  - Continuous cardiac  monitoring, monitor vital signs, obtain 12 lead EKG if indicated  - Evaluate effectiveness of antiarrhythmic and heart rate control medications as ordered  - Initiate emergency measures for life threatening arrhythmias  - Monitor electrolytes and administer replacement therapy as ordered  Outcome: Progressing

## 2024-04-16 ENCOUNTER — PATIENT OUTREACH (OUTPATIENT)
Dept: CASE MANAGEMENT | Age: 64
End: 2024-04-16

## 2024-04-16 DIAGNOSIS — Z02.9 ENCOUNTERS FOR UNSPECIFIED ADMINISTRATIVE PURPOSE: Primary | ICD-10-CM

## 2024-04-16 DIAGNOSIS — I48.91 ATRIAL FIBRILLATION WITH CONTROLLED VENTRICULAR RESPONSE (HCC): ICD-10-CM

## 2024-04-16 PROCEDURE — 1111F DSCHRG MED/CURRENT MED MERGE: CPT

## 2024-04-16 NOTE — PAYOR COMM NOTE
--------------  ADMISSION REVIEW     Payor: [facility]  Subscriber #:  [id number]  Authorization Number: [id number]    Admit date: 4/14/24  Admit time: 10:30 AM       REVIEW DOCUMENTATION:  ED Provider Notes signed by Tennille Sterling MD at 4/14/2024  9:40 AM        Patient Seen in: [facility] Emergency Department  History     Chief Complaint   Patient presents with    Fatigue    Difficulty Breathing     Stated Complaint: RUBY,weakness    Subjective:   HPI    63-year-old male presents to the emergency department complaining of overall fatigue, generalized weakness and shortness of breath primarily with exertion but no chest pain over the past couple of weeks.  The patient states that he has a history of paroxysmal atrial fibrillation and is anticoagulated on beta-blockers.  He has been in persistent A-fib for at least 2-1/2 weeks.  Typically his episodes will last for 24 to 48 hours and he has 2 or 3 episodes a year for the past couple of years.  He is scheduled to see [facility] cardiologist Dr. Ayo Rendon tomorrow.  He denies any other physical complaints.  He does exercise and went for a 20 mile bike ride yesterday and used a rowing machine for 30 minutes today working up a sweat but he felt very lightheaded as though he was going to pass out when he got off of the machine.    Objective:   Past Medical History:    Actinic keratosis    Acute, but ill-defined, cerebrovascular disease    ADD (attention deficit disorder)    Allergic rhinitis due to pollen    Allergic rhinitis, cause unspecified    Anxiety    Atherosclerosis of coronary artery    Atrial fibrillation (HCC)    CAD (coronary artery disease)    stent    Carotid stenosis    Coronary atherosclerosis of native coronary artery    Decorative tattoo    Diabetes (HCC)    Disorder of prostate    Easy bruising    Esophageal reflux    Essential hypertension    Essential hypertension, benign    Frequent urination    Gout    Heartburn    Hemorrhoids     Hyperlipidemia    Hyperthyroidism    Hypertrophy of prostate with urinary obstruction and other lower urinary tract symptoms (LUTS)    Impaired fasting glucose    Nocturia    Obesity    TAO (obstructive sleep apnea)    AHI 17.9 REM AHI 22.7 Supine AHI 71.5 non-supine AHI 13.7 Sao2 Brad 84.9%     Pure hypercholesterolemia    Reflux esophagitis    Sleep apnea    Stented coronary artery    Stress    Unspecified hypothyroidism    Wears glasses     Positive for stated complaint: RUBY,weakness  Other systems are as noted in HPI.  Constitutional and vital signs reviewed.      All other systems reviewed and negative except as noted above.    Physical Exam     ED Triage Vitals [04/14/24 0813]   /83   Pulse 62   Resp 14   Temp 98 °F (36.7 °C)   Temp src Temporal   SpO2 99 %   O2 Device None (Room air)       Current:BP (!) 127/92   Pulse 82   Temp 98 °F (36.7 °C) (Temporal)   Resp 10   Ht 180.3 cm (5' 11\")   Wt 107.5 kg   SpO2 97%   BMI 33.05 kg/m²         General appearance: This is a middle-aged male lying on a gurney.  Vital signs were reviewed per nurses notes.  Monitor reveals atrial fibrillation rate in the 70s.  Initial blood pressure was 124/79.  HEENT: Normocephalic atraumatic.  Anicteric sclera.  Oral mucosa is moist.  Oropharynx is normal.  Neck: No adenopathy or thyromegaly.  Lungs are clear to auscultation.  Heart exam: Normal S1-S2 without extra sounds or murmurs.  Regular rate but irregularly irregular rhythm.  Abdomen is nontender.  Extremities are atraumatic.  Skin is dry without rashes or lesions.  Neuroexam: Alert and oriented x 4.  Speech is fluent.  Moving all 4 extremities well.    ED Course     Labs Reviewed   COMP METABOLIC PANEL (14) - Abnormal; Notable for the following components:       Result Value    Glucose 132 (*)     BUN 24 (*)     All other components within normal limits   PRO BETA NATRIURETIC PEPTIDE - Abnormal; Notable for the following components:    Pro-Beta Natriuretic  Peptide 278 (*)     All other components within normal limits   TROPONIN I HIGH SENSITIVITY - Normal   SARS-COV-2 BY PCR (GENEXPERT) - Normal   CBC WITH DIFFERENTIAL WITH PLATELET    Narrative:     The following orders were created for panel order CBC With Differential With Platelet.  Procedure                               Abnormality         Status                     ---------                               -----------         ------                     CBC W/ DIFFERENTIAL[[id number]]                              Final result                 Please view results for these tests on the individual orders.   RAINBOW DRAW LAVENDER   RAINBOW DRAW LIGHT GREEN   RAINBOW DRAW BLUE   CBC W/ DIFFERENTIAL     EKG    Rate, intervals and axes as noted on EKG Report.  Rate: 74  Rhythm: Atrial Fibrillation  Reading: Premature ventricular or aberrantly conducted complexes.  Abnormal EKG.  Agree with EKG report.    Intravenous access was obtained.  Laboratory studies were drawn.    XR CHEST AP PORTABLE  (CPT=71045)    Result Date: 4/14/2024            PROCEDURE:  XR CHEST AP PORTABLE  (CPT=71045)  TECHNIQUE:  AP chest radiograph was obtained.  COMPARISON:  [facility] , XR, XR CHEST AP PORTABLE  (CPT=71045), 12/23/2021, 12:12 PM.  INDICATIONS:  RUBY,weakness  PATIENT STATED HISTORY: (As transcribed by Technologist)  Patient has afib and almost passed out this morning.    FINDINGS: Cardiac silhouette and pulmonary vasculature are unremarkable. No consolidation, pleural effusion or pneumothorax. IMPRESSION: Unremarkable portable chest radiograph.   LOCATION:  [facility]      Dictated by (CST): Otf Street MD on 4/14/2024 at 8:45 AM     Finalized by (CST): Otf Street MD on 4/14/2024 at 8:45 AM       I personally reviewed the images myself and went over results with patient.    I viewed the chest x-ray films myself.  There is no acute cardiopulmonary abnormality.    The patient remained hemodynamically stable throughout the course  the emergency department stay.  Case was discussed with [facility] Dr. Denia Martinez and TACHO VICTORIA on-call.  Patient will be admitted for cardiac evaluation and possible cardioversion tomorrow.    Test results and treatment plan were discussed with the patient and his daughter prior to admission.     #1.  Atrial fibrillation with controlled ventricular response.  The patient exercises vigorously and is not tolerating the lack of an atrial kick due to chronic atrial fibrillation.  Currently anticoagulated.  Admitted for evaluation of possible cardioversion.  2.  Generalized fatigue secondary to #1.  3.  Anticoagulated.    Admission disposition: 4/14/2024  9:37 AM      Disposition and Plan     Clinical Impression:  1. Atrial fibrillation with controlled ventricular response (HCC)    2. Other fatigue    3. Anticoagulated       Disposition:  Admit  4/14/2024  9:37 am  Hospital Problems       Present on Admission  Date Reviewed: 1/14/2024            ICD-10-CM Noted POA    * (Principal) Atrial fibrillation with controlled ventricular response (HCC) I48.91 4/14/2024 Unknown                 4/14 H&P  Chief Complaint: fatigue        Subjective:  History of Present Illness:      Bertrand Gonzalez is a 63 year old male with medical history of CAD, hypothyroidism PAF, hypertension, hyperlipidemia, DM type II and GERD who comes to the ER with complaints of fatigue and persistent A.fib.  He reports that he usually gets about 2 episodes of A.fib  a year which last about 36 hours .  He works out hard and his states it converts him to a normal rhythm then.  Over the past 17 days he reports he has been in A.fib and has been feeling very fatigued. He biked about 20 miles yesterday and was feeling not right afterwards.  He reporst that his HR is not over 70s even with exercise.  He went on the rower today and after 30 minutes started to feel off again.  He was weak and his vision was starting to turn black.  He rested on a  recliner but when he tried to get up he continues to feel off so he came to the ER for further evaluation.  He denies headache, tingling, numbness, chest pain, shortness of breath, or palpitations.      /81 (BP Location: Left arm)   Pulse 79   Temp 98 °F (36.7 °C) (Temporal)   Resp 10   Ht 5' 11\" (1.803 m)   Wt 239 lb 3.2 oz (108.5 kg)   SpO2 94%   BMI 33.36 kg/m²   General: No acute distress, Alert  Respiratory: No rhonchi, no wheezes  Cardiovascular: irreg irreg  Abdomen: Soft, Non-tender, non-distended, positive bowel sounds  Neuro: No new focal deficits  Extremities: No edema    Assessment & Plan:  #A.fib, rate controlled  -symptomatic  -continue atenolol and eliquis  -cardiology to see, may need cardioversion     #DM type II  -hold PTA metformin  -hyperglycemia protocol     #Hypothyroidism  -resume levothyroxine     #Hypertension  -resume PTA amlodipine, losartan          4/14 Cardiology  64 y/o male with hx PCI 8 years ago, HTN, DM, PAF, normal EF with afib and near syncope. Echo today. Cardioversion tomorrow.           4/15 Cardiology  /90 (BP Location: Left arm)   Pulse 63   Temp 98.3 °F (36.8 °C) (Oral)   Resp 18   Ht 5' 11\" (1.803 m)   Wt 239 lb 3.2 oz (108.5 kg)   SpO2 96%   BMI 33.36 kg/m²      Telemetry: Afib  Lab 04/14/24  0818 04/15/24  0545   * 143*   BUN 24* 16   CREATSERUM 1.21 1.24   EGFRCR 66 65   CA 9.4 9.5    140   K 4.3 4.3    108   CO2 24.0 26.0     Physical Exam  Cardiovascular:      Rate and Rhythm: Normal rate. Rhythm irregular.   Pulmonary:      Effort: Pulmonary effort is normal.      Breath sounds: No wheezing or rales.   Abdominal:      Palpations: Abdomen is soft.   Musculoskeletal:      Right lower leg: No edema.      Left lower leg: No edema.   Neurological:      Mental Status: He is alert and oriented to person, place, and time.            Medications:  Scheduled Medications    amLODIPine  5 mg Oral Daily    apixaban  5 mg Oral BID     aspirin  81 mg Oral Daily    atenolol  25 mg Oral Daily Beta Blocker    atorvastatin  40 mg Oral Daily    FLUoxetine  20 mg Oral Daily    levothyroxine  50 mcg Oral Before breakfast    losartan  100 mg Oral Daily    multivitamin  1 tablet Oral Daily         Medication Infusions    sodium chloride              Assessment:   64yo presenting near syncope and more frequent Afib episodes.      Paroxysmal Afib: ? Symptomatic, but interestingly had been in afib for 17 days with controlled HR, but had near syncope just recently after long car ride and golf game ? Mildly dry syncope on presentation, tsh wnl, historically preserved EF, compliant with apixiban  CAD:   HTN: fair controll, follow outpt  HL: statin  DM2     Plan:     DC home pending successful DCCV.   Follow up in afib clinic to discuss ablation vs antiarrythmic in future.   Outpt sleep study     Tele: AF     Exam  General- awake alert  HEENT- PEERLA  Neck- JVP normal  CV- irreg irreg  Lungs- CTA  Extremities- no edema  Neuro- Normal  Psych- mood/affect congruent  Skin- no lesions     I have personally performed the medical decision making in its entirety. My additions include:  Plan  - DCCV today. Verified Uninterrupted OAC > 4 weeks  - outpt discussion for AAD /Ablation            4/15 Electrophysiology Procedure Note   Pre-Operative Diagnosis: af     Post-Operative Diagnosis: Same as above  PROCEDURE(S) PERFORMED:    1.    Cardioversion.  2.     Sedation      :  Rose Lance MD     ANESTHESIA:  IV sedation.  Moderate conscious sedation for this procedure was administered and personally monitored. Brevital 60 mg  Sedation time: 10 MIN     INDICATION:  Persistent Atrial Fibrillation     COMPLICATIONS:  None.     METHODS:  The patient was brought to the outpatient cardiac telemetry unit in a fasting and nonsedated state after providing informed consent.  IV sedation was administered during continuous ECG, pulse oximeter and noninvasive hemodynamic  monitoring.  After administering IV Brevital in intermittent boluses, the desired level of sedation was achieved.      Cardioversion Energy:  200J     Pad Position: Base-[place]io  Pre- Cardioversion Rhythm- aFIB  Post Cardioversion Rhythm - sINSU RASHMI      CONCLUSIONS:  1.    Successful cardioversion        Plan:  1- Rhythm/Rate Control Meds: no changes  2) Anticoagulation- no changes  3) Follow Up- 1 month- Dr. Ambrocio      MEDICATIONS ADMINISTERED IN LAST 1 DAY:  methohexital (BrevITAL) 100 mg/10mL IV syringe       Date Action Dose Route User    Discharged on 4/15/2024    4/15/2024 1440 Given 60 mg IV Push Astrid Velez RN            Vitals (last day) before discharge       Date/Time Temp Pulse Resp BP SpO2 Weight O2 Device O2 Flow Rate (L/min) [facility]    04/15/24 1600 97.8 °F (36.6 °C) -- -- -- -- -- -- -- NB    04/15/24 1600 -- 53 16 118/64 97 % -- None (Room air) --     04/15/24 1545 -- 50 17 103/60 96 % -- -- --     04/15/24 1530 -- 49 12 99/62 96 % -- -- --     04/15/24 1515 -- 47 16 110/42 97 % -- None (Room air) --     04/15/24 1500 -- 46 10 104/63 97 % -- -- --     04/15/24 1455 -- 49 16 -- 98 % -- -- --     04/15/24 1450 -- 47 10 -- 99 % -- -- --     04/15/24 1445 -- 53 19 147/72 99 % -- -- --     04/15/24 1440 -- 73 10 113/87 98 % -- -- --     04/15/24 1435 -- 66 8 113/94 98 % -- Nasal cannula 3 L/min     04/15/24 1430 -- 63 8 122/83 98 % -- -- --     04/15/24 1213 98.6 °F (37 °C) 68 18 127/80 92 % -- None (Room air) --     04/15/24 0801 98.3 °F (36.8 °C) 63 18 127/90 96 % -- None (Room air) -- NB    04/15/24 0524 97.9 °F (36.6 °C) 71 18 125/84 94 % -- None (Room air) --     04/14/24 2252 97.7 °F (36.5 °C) 72 16 117/68 94 % -- None (Room air) --     04/14/24 2018 97.4 °F (36.3 °C) 73 18 130/80 97 % -- None (Room air) -- MB    04/14/24 1752 98.2 °F (36.8 °C) 84 18 126/82 96 % -- None (Room air) -- NB    04/14/24 1338 -- 62 -- -- 97 % -- -- -- NB    04/14/24 1045 -- 79 -- 117/81 94  % -- -- -- MS    04/14/24 1043 -- 78 -- 124/98 95 % -- -- -- MS    04/14/24 1041 -- 69 -- 130/94 96 % -- -- -- MS    04/14/24 1040 -- -- -- -- -- 239 lb 3.2 oz -- -- MS    04/14/24 0934 -- 82 10 127/92 97 % -- None (Room air) --     04/14/24 0930 -- 69 16 127/92 97 % -- None (Room air) --     04/14/24 0900 -- 71 17 124/79 96 % -- None (Room air) --     04/14/24 0830 -- 49 18 145/95 98 % -- None (Room air) --     04/14/24 0824 -- -- -- -- -- -- None (Room air) --     04/14/24 0813 98 °F (36.7 °C) 62 14 140/83 99 % 237 lb None (Room air) --                --------------  DISCHARGE REVIEW    Payor: [facility]  Subscriber #:  [id number]  Authorization Number: [id number]    Admit date: 4/14/24  Admit time:  10:30 AM  Discharge Date: 4/15/2024  5:20 PM     Admitting Physician: Adri Pickard MD  Attending Physician:  No att. providers found  Primary Care Physician: Sarah Londono MD

## 2024-04-16 NOTE — PROGRESS NOTES
Initial Post Discharge Follow Up   Discharge Date: 4/15/24  Contact Date: 4/16/2024    Consent Verification:  Assessment Completed With: Patient  HIPAA Verified?  Yes    Discharge Dx:   Atrial fibrillation with controlled ventricular response (HCC)     General:   How have you been since your discharge from the hospital? NCM spoke with pt states feels great. Pt denies any fevers, chills, nausea, no vomiting, no shortness of breath, or any other symptoms at this time. Pt denies any chest pain.   Do you have any pain since discharge?  No    How well was your pain managed while in the hospital?   On a scale of 1-5   1- Very Poor and 5- Very well   5  When you were leaving the hospital were your discharge instructions reviewed with you? Yes  How well were your discharge instructions explained to you?   On a scale of 1-5   1- Very Poor and 5- Very well   5  Do you have any questions about your discharge instructions?  No  Before leaving the hospital was your diagnoses explained to you? Yes  Do you have any questions about your diagnoses? No  Are you able to perform normal daily activities of living as you have prior to your hospital stay (dressing, bathing, ambulating to the bathroom, etc)? yes  (NCM) Was patient given a different diet per AVS? no      Medications: Reviewed medication list.  Medications are up to date.  Current Outpatient Medications   Medication Sig Dispense Refill    levothyroxine 50 MCG Oral Tab TAKE 1 TABLET(50 MCG) BY MOUTH BEFORE BREAKFAST 45 tablet 0    amLODIPine 5 MG Oral Tab Take 1 tablet (5 mg total) by mouth daily. 90 tablet 0    losartan 100 MG Oral Tab Take 1 tablet (100 mg total) by mouth daily. 90 tablet 2    atorvastatin 40 MG Oral Tab Take 1 tablet (40 mg total) by mouth nightly. 90 tablet 2    apixaban 5 MG Oral Tab Take 1 tablet (5 mg total) by mouth 2 (two) times daily. 60 tablet 3    aspirin 81 MG Oral Tab EC Take 1 tablet (81 mg total) by mouth daily.      FLUoxetine HCl 20 MG Oral  Cap Take 1 capsule (20 mg total) by mouth daily. 30 capsule 1    ClonazePAM 0.5 MG Oral Tab Take 1 tablet (0.5 mg total) by mouth nightly as needed for Anxiety. 30 tablet 1    Multiple Vitamin (TAB-A-ZULAY) Oral Tab Take 1 tablet by mouth daily.      atenolol 25 MG Oral Tab Take 1 tablet (25 mg total) by mouth daily. 90 tablet 1     Were there any changes to your current medication(s) noted on the AVS? Yes  If so, were these medication changes discussed with you prior to leaving the hospital? Yes  If a new medication was prescribed:    Was the new medication's purpose & side effects reviewed? Yes  Do you have any questions about your new medication? No  Did you  your discharge medications when you left the hospital? Yes  Let's go over your medications together to make sure we are not missing anything. Medications Reviewed  Are there any reasons that keep you from taking your medication as prescribed? No  Are you having any concerns with constipation? No      Discharge medications reviewed/discussed/and reconciled against outpatient medications with patient.  Any changes or updates to medications sent to PCP.  Patient Acknowledged     Referrals/orders at D/C:  Referrals/orders placed at D/C? no    DME ordered at D/C? No      Discharge orders, AVS reviewed and discussed with patient. Any changes or updates to orders sent to PCP.  Patient Acknowledged      SDOH:   Transportation Needs: No Transportation Needs (4/14/2024)    Transportation Needs     Lack of Transportation: No     Financial Resource Strain: Low Risk  (4/16/2024)    Financial Resource Strain     Difficulty of Paying Living Expenses: Not hard at all     Med Affordability: Not on file           Follow up appointments:      Your appointments       Date & Time Appointment Department (Center)    Apr 17, 2024 7:40 AM [facility] Follow Up with Sarah Londono MD [facility], [address] [place] [address] ([facility] /[facility])               [facility], [address] [place] [address] [address] IM/FM [place] [id number] [facility] [address]  [facility] [id number] [phone]            TCC  Was TCC ordered: No      PCP (If no TCC appointment)  Does patient already have a PCP appointment scheduled? Yes  NCM Confirmed PCP office TCM appointment with patient      Specialist    Does the patient have any other follow up appointment(s) needing to be scheduled? No      Is there any reason as to why you cannot make your appointment(s)?  No     Needs post D/C:   Now that you are home, are there any needs or concerns you need addressed before your next visit with your PCP?  (DME, meds, questions, etc.): No    Interventions by NCM:   All discharge instructions reviewed with the patient. Reviewed when to call MD vs when to call 911 or go the ED. Educated patient on the importance of taking all meds as prescribed as well as close f/u with PCP/specialists. Pt verbalized understanding and will contact the office with any further questions or concerns. Patient denies fevers, chills, nausea, vomiting, shortness of breath, chest pain, or any other symptoms at this time.  NCM provided contact information for any further questions/concerns. Patient verbalized understanding and agreeable.       Overall Rating:   How would you rate the care you received while in the hospital? good    CCM referral placed:    No    BOOK BY DATE: 04/29/24

## 2024-04-17 ENCOUNTER — OFFICE VISIT (OUTPATIENT)
Dept: FAMILY MEDICINE CLINIC | Facility: CLINIC | Age: 64
End: 2024-04-17
Payer: COMMERCIAL

## 2024-04-17 VITALS
HEIGHT: 71 IN | SYSTOLIC BLOOD PRESSURE: 138 MMHG | OXYGEN SATURATION: 98 % | HEART RATE: 49 BPM | BODY MASS INDEX: 34.21 KG/M2 | WEIGHT: 244.38 LBS | TEMPERATURE: 97 F | RESPIRATION RATE: 16 BRPM | DIASTOLIC BLOOD PRESSURE: 68 MMHG

## 2024-04-17 DIAGNOSIS — Z13.0 SCREENING FOR DEFICIENCY ANEMIA: ICD-10-CM

## 2024-04-17 DIAGNOSIS — E03.9 ACQUIRED HYPOTHYROIDISM: ICD-10-CM

## 2024-04-17 DIAGNOSIS — Z09 HOSPITAL DISCHARGE FOLLOW-UP: Primary | ICD-10-CM

## 2024-04-17 DIAGNOSIS — Z23 NEED FOR VACCINATION: ICD-10-CM

## 2024-04-17 DIAGNOSIS — I48.91 ATRIAL FIBRILLATION STATUS POST CARDIOVERSION (HCC): ICD-10-CM

## 2024-04-17 DIAGNOSIS — E78.2 MIXED HYPERLIPIDEMIA: ICD-10-CM

## 2024-04-17 DIAGNOSIS — I10 ESSENTIAL HYPERTENSION: ICD-10-CM

## 2024-04-17 DIAGNOSIS — E11.65 TYPE 2 DIABETES MELLITUS WITH HYPERGLYCEMIA, WITHOUT LONG-TERM CURRENT USE OF INSULIN (HCC): ICD-10-CM

## 2024-04-17 PROBLEM — I25.10 ATHEROSCLEROSIS OF CORONARY ARTERY: Status: ACTIVE | Noted: 2020-02-10

## 2024-04-17 PROCEDURE — 99214 OFFICE O/P EST MOD 30 MIN: CPT | Performed by: FAMILY MEDICINE

## 2024-04-17 PROCEDURE — 1111F DSCHRG MED/CURRENT MED MERGE: CPT | Performed by: FAMILY MEDICINE

## 2024-04-17 PROCEDURE — 90677 PCV20 VACCINE IM: CPT | Performed by: FAMILY MEDICINE

## 2024-04-17 PROCEDURE — 99496 TRANSJ CARE MGMT HIGH F2F 7D: CPT | Performed by: FAMILY MEDICINE

## 2024-04-17 PROCEDURE — 90471 IMMUNIZATION ADMIN: CPT | Performed by: FAMILY MEDICINE

## 2024-04-17 RX ORDER — ATORVASTATIN CALCIUM 40 MG/1
40 TABLET, FILM COATED ORAL NIGHTLY
Qty: 90 TABLET | Refills: 1 | Status: SHIPPED | OUTPATIENT
Start: 2024-04-17

## 2024-04-17 RX ORDER — LEVOTHYROXINE SODIUM 0.05 MG/1
50 TABLET ORAL
Qty: 90 TABLET | Refills: 1 | Status: SHIPPED | OUTPATIENT
Start: 2024-04-17

## 2024-04-17 RX ORDER — AMLODIPINE BESYLATE 5 MG/1
5 TABLET ORAL DAILY
Qty: 90 TABLET | Refills: 1 | Status: SHIPPED | OUTPATIENT
Start: 2024-04-17

## 2024-04-17 RX ORDER — LOSARTAN POTASSIUM 100 MG/1
100 TABLET ORAL DAILY
Qty: 90 TABLET | Refills: 1 | Status: SHIPPED | OUTPATIENT
Start: 2024-04-17

## 2024-04-17 RX ORDER — METFORMIN HYDROCHLORIDE 500 MG/1
500 TABLET, EXTENDED RELEASE ORAL DAILY
Qty: 90 TABLET | Refills: 1 | Status: SHIPPED | OUTPATIENT
Start: 2024-04-17

## 2024-04-17 RX ORDER — AMLODIPINE BESYLATE 5 MG/1
5 TABLET ORAL DAILY
Qty: 90 TABLET | Refills: 0 | OUTPATIENT
Start: 2024-04-17

## 2024-04-17 NOTE — PROGRESS NOTES
Subjective:   Bertrand Gonzalez is a 63 year old male who presents for hospital follow up and medication refill   He was discharged from [facility] to Home or Self Care  Admission Date: 4/14/24   Discharge Date: 4/15/24  Hospital Discharge Diagnosis:   A.fib rate controlled, symptomatic  Diabetes  Hypertension  Hyperlipidemia  Hypothyroidism    Interactive contact within 2 business days post discharge first initiated on Date: 4/16/2024    During the visit, the following was completed:  Obtained and reviewed discharge summary, continuity of care documents, Hospitalist notes, and Cardiology notes  Reviewed Labs (CBC, CMP), Labs (Cardiac markers), and EKG    HPI:   Patient  went to ER with fatigue and persistent A-fib for more than 2 weeks.   was cardioverted and since his discharge his rhythm has been normal.    Compliant with diabetes medication and diet, tolerating medications well without any side effects.  Patient  did get his eye exam done in [place] with Dr. Nikolas Villanueva 3 months ago and everything was normal.  Denies hypoglycemia, denies increased urination or thirst.     Compliant with cholesterol medication but not with diet, tolerating medication well without any side effects.     Compliant with blood pressure medication and partial compliance with low salt diet.  Tolerating medications well without any side effects.     Uses his CPAP daily.     Compliant with thyroid medication, tolerating medication well without any side effects.  Patient  has been more compliant with taking his medication on empty stomach.   wakes up at 2 AM in the morning to use the bathroom and remembers to take the medication at that time.    History/Other:   Current Medications:  Medication Reconciliation:  I am aware of an inpatient discharge within the last 30 days.  The discharge medication list has been reconciled with the patient's current medication list and reviewed by me.  See medication list  for additions of new medication, and changes to current doses of medications and discontinued medications.  No outpatient medications have been marked as taking for the 4/17/24 encounter (Office Visit) with Sarah Londono MD.       Review of Systems:  GENERAL: weight stable, energy stable, no sweating  SKIN: denies any unusual skin lesions  EYES: denies blurred vision or double vision  HEENT: denies nasal congestion, sinus pain or ST  LUNGS: denies shortness of breath with exertion  CARDIOVASCULAR: denies chest pain on exertion or palpitations  GI: denies abdominal pain, denies heartburn, denies diarrhea  MUSCULOSKELETAL: denies pain, normal range of motion of extremities  NEURO: denies headaches, denies dizziness, denies weakness  PSYCHE: denies depression or anxiety  HEMATOLOGIC: denies hx of anemia, or bruising, denies bleeding  ENDOCRINE: denies thyroid history  ALL/ASTHMA: denies hx of allergy or asthma    Objective:   No LMP for male patient.  Estimated body mass index is 34.09 kg/m² as calculated from the following:    Height as of this encounter: 5' 11\" (1.803 m).    Weight as of this encounter: 244 lb 6.4 oz (110.9 kg).   /68   Pulse (!) 49   Temp 97.2 °F (36.2 °C) (Temporal)   Resp 16   Ht 5' 11\" (1.803 m)   Wt 244 lb 6.4 oz (110.9 kg)   SpO2 98%   BMI 34.09 kg/m²    GENERAL: obese, in no apparent distress  SKIN: no rashes, no suspicious lesions  HEENT: atraumatic, normocephalic, ears and throat are clear  EYES: PERRLA, EOMI, conjunctiva are clear  NECK: supple, no adenopathy, no bruits  CHEST: no chest tenderness  LUNGS: clear to auscultation  CARDIO: bradycardia without murmur  GI: good BS's, no masses, HSM or tenderness  MUSCULOSKELETAL: back is not tender, FROM of the extremities  EXTREMITIES: no cyanosis, clubbing or edema  NEURO: Oriented times three, cranial nerves are intact, motor and sensory are grossly intact    Assessment & Plan:   There are no diagnoses linked to this  encounter.  Bertrand was seen today for hospital f/u.    Diagnoses and all orders for this visit:    Hospital discharge follow-up    Atrial fibrillation status post cardioversion (HCC)       - patient is in sinus rhythm, bradycardia.       -  has a f/u scheduled with cardiology    Essential hypertension controlled  -     amLODIPine 5 MG Oral Tab; Take 1 tablet (5 mg total) by mouth daily.  -     losartan 100 MG Oral Tab; Take 1 tablet (100 mg total) by mouth daily.  -     continue the same dose of medication  -     limit salt to less than 1000 mg daily  -     Goal BP less than 130/80    Mixed hyperlipidemia  -     atorvastatin 40 MG Oral Tab; Take 1 tablet (40 mg total) by mouth nightly.  -     Lipid Panel; Future    Acquired hypothyroidism controlled  -     levothyroxine 50 MCG Oral Tab; Take 1 tablet (50 mcg total) by mouth before breakfast.  -     TSH and Free T4; Future  -     Discussed with patient levels not optimal, recommend adjusting the dose, patient would like to continue the same dose for now as he is not symptomatic.    Type 2 diabetes mellitus with hyperglycemia, without long-term current use of insulin (HCC) controlled  -     metFORMIN  MG Oral Tablet 24 Hr; Take 1 tablet (500 mg total) by mouth daily.  -     Comp Metabolic Panel (14); Future  -     Hemoglobin A1C; Future  -     Continue the same dose of medication  -     Encouraged to continue to limit refined sugars and carbohydrates in diet.    Need for vaccination  -     Prevnar 20 (PCV20) [71887]    Screening for deficiency anemia  -     CBC With Differential With Platelet; Future          In 6 months for DM, HTN, Hyperlipidemia        The 21st Century Cures Act makes medical notes like these available to patients in the interest of transparency. Please be advised this is a medical document. Medical documents are intended to carry relevant information, facts as evident, and the clinical opinion of the practitioner. The medical note is  intended as peer to peer communication and may appear blunt or direct. It is written in medical language and may contain abbreviations or verbiage that are unfamiliar.

## 2024-04-22 NOTE — DISCHARGE SUMMARY
[facility]     Bertrand Gonzalez Patient Status:  Inpatient    1960 MRN [id number]   Location [facility] 2NE-A Attending No att. providers found   Hosp Day # 1 PCP Sarah Londono MD     Date of Admission: 2024  Date of Discharge:  4/15/2024     Discharge Disposition: Home or Self Care    Discharge Diagnosis:  Symptomatic atrial fibrillation  Diabetes mellitus type 2  Hypothyroidism  Hypertension    History of Present Illness:   Bertrand Gonzalez is a 63 year old male with medical history of CAD, hypothyroidism PAF, hypertension, hyperlipidemia, DM type II and GERD who comes to the ER with complaints of fatigue and persistent A.fib.  He reports that he usually gets about 2 episodes of A.fib  a year which last about 36 hours .  He works out hard and his states it converts him to a normal rhythm then.  Over the past 17 days he reports he has been in A.fib and has been feeling very fatigued. He biked about 20 miles yesterday and was feeling not right afterwards.  He reporst that his HR is not over 70s even with exercise.  He went on the rower today and after 30 minutes started to feel off again.  He was weak and his vision was starting to turn black.  He rested on a recliner but when he tried to get up he continues to feel off so he came to the ER for further evaluation.  He denies headache, tingling, numbness, chest pain, shortness of breath, or palpitations.        Brief Synopsis: Patient is a 63-year-old man admitted with symptomatic atrial fibrillation.  He was continued on atenolol and Eliquis.  He was seen by cardiology and underwent a successful cardioversion.  He tolerated the procedure well and was discharged home in good condition    Lace+ Score: 65  59-90 High Risk  29-58 Medium Risk  0-28   Low Risk       TCM Follow-Up Recommendation:  LACE 29-58: Moderate Risk of readmission after discharge from the hospital.  **Certification    Admission date was 2024.   Inpatient stay was shorter than expected.  Patient's Atrial fibrillation with controlled ventricular response (HCC) was initially serious enough to expect a more lengthy hospitalization but patient improved faster than expected.                 Procedures during hospitalization:   Cardioversion      Consultants:  Cardiology    Discharge Medication List:     Discharge Medications        CONTINUE taking these medications        Instructions Prescription details   apixaban 5 MG Tabs  Commonly known as: Eliquis      Take 1 tablet (5 mg total) by mouth 2 (two) times daily.   Quantity: 60 tablet  Refills: 3     aspirin 81 MG Tbec      Take 1 tablet (81 mg total) by mouth daily.   Refills: 0     atenolol 25 MG Tabs  Commonly known as: Tenormin      Take 1 tablet (25 mg total) by mouth daily.   Quantity: 90 tablet  Refills: 1     clonazePAM 0.5 MG Tabs  Commonly known as: KlonoPIN      Take 1 tablet (0.5 mg total) by mouth nightly as needed for Anxiety.   Quantity: 30 tablet  Refills: 1     FLUoxetine 20 MG Caps  Commonly known as: PROzac      Take 1 capsule (20 mg total) by mouth daily.   Quantity: 30 capsule  Refills: 1     Tab-A-Sheeba Tabs      Take 1 tablet by mouth daily.   Refills: 0              [place]MP reviewed[place] n[place]    Follow-up appointment:   Terrell Ambrocio MD  [address] [address]  [phone]    Follow up in 1 month(s)  office will call to schedule follow up    Sarah Londono MD  [address] [place] [address]  [phone]    Schedule an appointment as soon as possible for a visit in 1 week(s)      Appointments for Next 30 Days 4/22/2024 - 5/22/2024      None            Vital signs:       Physical Exam:    General: No acute distress   Lungs: clear to auscultation  Cardiovascular: S1, S2  Abdomen: Soft      -----------------------------------------------------------------------------------------------  PATIENT DISCHARGE INSTRUCTIONS: See electronic  chart    Rosio Martinez MD    Total time spent on discharge plannin minutes     The  [employer] Cures Act makes medical notes like these available to patients in the interest of transparency. Please be advised this is a medical document. Medical documents are intended to carry relevant information, facts as evident, and the clinical opinion of the practitioner. The medical note is intended as peer to peer communication and may appear blunt or direct. It is written in medical language and may contain abbreviations or verbiage that are unfamiliar.

## 2024-07-15 ENCOUNTER — NURSE TRIAGE (OUTPATIENT)
Dept: INTERNAL MEDICINE CLINIC | Facility: CLINIC | Age: 64
End: 2024-07-15

## 2024-07-15 NOTE — TELEPHONE ENCOUNTER
Action Requested: Summary for Provider     []  Critical Lab, Recommendations Needed  [x] Need Additional Advice  []   FYI    []   Need Orders  [] Need Medications Sent to Pharmacy  []  Other     SUMMARY: Received call from pt. Per pt, for past 3 days he has had 10/10 pain in L large toe to ball of foot, does have hx of gout but has not had a flare in awhile. His sleep and eating has been affected due to pain. Toe is red/swollen. Pt has been using ibuprofen/ice/elevating with no improvement. Pt stated even the touch of a tissue increases pain. Pt asking if Dr. Londono can see him or send something in for him as he cannot tolerate pain. Informed him gout can often be confused with other causes, such as skin infection or joint infection, so would likely need eval. Dicussed UC with pt, but pt would like Dr. Londono's recs/see if she can see him. Informed him Dr. Londono already double booked today and tomorrow, so unsure if she will be able to, but will ask and call him back.     Dr. Londono, would you be able to see pt or direct to UC?     Reason for call: Acute  Onset: Data Unavailable                       Reason for Disposition   SEVERE pain (e.g., excruciating, unable to do any normal activities) and not improved after 2 hours of pain medicine   Swollen joint with no fever or redness   Pain in the big toe joint    Protocols used: Toe Pain-A-OH

## 2024-07-15 NOTE — TELEPHONE ENCOUNTER
Called and spoke to pt. Offered appt tomorrow am or can be seen in UC today. Pt agreeable to appt tomorrow. Pt asking if he can take more advil, already taking max recommended dosing. Advised not to exceed max, can alternate with tylenol. Pt stated understanding.

## 2024-07-16 ENCOUNTER — OFFICE VISIT (OUTPATIENT)
Dept: FAMILY MEDICINE CLINIC | Facility: CLINIC | Age: 64
End: 2024-07-16
Payer: COMMERCIAL

## 2024-07-16 VITALS
BODY MASS INDEX: 33.88 KG/M2 | WEIGHT: 242 LBS | DIASTOLIC BLOOD PRESSURE: 88 MMHG | HEART RATE: 82 BPM | RESPIRATION RATE: 18 BRPM | OXYGEN SATURATION: 98 % | SYSTOLIC BLOOD PRESSURE: 136 MMHG | HEIGHT: 71 IN | TEMPERATURE: 98 F

## 2024-07-16 DIAGNOSIS — E66.09 CLASS 1 OBESITY DUE TO EXCESS CALORIES WITH SERIOUS COMORBIDITY AND BODY MASS INDEX (BMI) OF 33.0 TO 33.9 IN ADULT: ICD-10-CM

## 2024-07-16 DIAGNOSIS — M10.072 ACUTE IDIOPATHIC GOUT INVOLVING TOE OF LEFT FOOT: Primary | ICD-10-CM

## 2024-07-16 DIAGNOSIS — E11.9 CONTROLLED TYPE 2 DIABETES MELLITUS WITHOUT COMPLICATION, WITHOUT LONG-TERM CURRENT USE OF INSULIN (HCC): ICD-10-CM

## 2024-07-16 PROCEDURE — 3051F HG A1C>EQUAL 7.0%<8.0%: CPT | Performed by: FAMILY MEDICINE

## 2024-07-16 PROCEDURE — 3061F NEG MICROALBUMINURIA REV: CPT | Performed by: FAMILY MEDICINE

## 2024-07-16 PROCEDURE — 3075F SYST BP GE 130 - 139MM HG: CPT | Performed by: FAMILY MEDICINE

## 2024-07-16 PROCEDURE — 3079F DIAST BP 80-89 MM HG: CPT | Performed by: FAMILY MEDICINE

## 2024-07-16 PROCEDURE — 3044F HG A1C LEVEL LT 7.0%: CPT | Performed by: FAMILY MEDICINE

## 2024-07-16 PROCEDURE — 3060F POS MICROALBUMINURIA REV: CPT | Performed by: FAMILY MEDICINE

## 2024-07-16 PROCEDURE — 99213 OFFICE O/P EST LOW 20 MIN: CPT | Performed by: FAMILY MEDICINE

## 2024-07-16 PROCEDURE — 3008F BODY MASS INDEX DOCD: CPT | Performed by: FAMILY MEDICINE

## 2024-07-16 RX ORDER — BLOOD SUGAR DIAGNOSTIC
STRIP MISCELLANEOUS
Qty: 100 EACH | Refills: 0 | Status: SHIPPED | OUTPATIENT
Start: 2024-07-16

## 2024-07-16 RX ORDER — PREDNISONE 10 MG/1
TABLET ORAL
Qty: 21 TABLET | Refills: 0 | Status: SHIPPED | OUTPATIENT
Start: 2024-07-16 | End: 2024-07-22

## 2024-07-16 RX ORDER — BLOOD-GLUCOSE METER
1 EACH MISCELLANEOUS 2 TIMES DAILY
Qty: 1 KIT | Refills: 0 | Status: SHIPPED | OUTPATIENT
Start: 2024-07-16 | End: 2025-07-16

## 2024-07-16 RX ORDER — BLOOD-GLUCOSE METER
1 EACH MISCELLANEOUS 2 TIMES DAILY
Qty: 1 KIT | Refills: 0 | COMMUNITY
Start: 2024-07-16 | End: 2024-07-16

## 2024-07-16 RX ORDER — KETOROLAC TROMETHAMINE 30 MG/ML
30 INJECTION, SOLUTION INTRAMUSCULAR; INTRAVENOUS ONCE
Status: SHIPPED | OUTPATIENT
Start: 2024-07-16 | End: 2024-07-18

## 2024-07-16 RX ORDER — HYDROCODONE BITARTRATE AND ACETAMINOPHEN 5; 325 MG/1; MG/1
1 TABLET ORAL EVERY 8 HOURS PRN
Qty: 10 TABLET | Refills: 0 | Status: SHIPPED | OUTPATIENT
Start: 2024-07-16

## 2024-07-16 RX ORDER — LANCETS 33 GAUGE
1 EACH MISCELLANEOUS DAILY
Qty: 100 EACH | Refills: 0 | Status: SHIPPED | OUTPATIENT
Start: 2024-07-16 | End: 2024-10-24

## 2024-07-16 NOTE — PROGRESS NOTES
Bertrand Gonzalez is a 63 year old male.  Chief Complaint   Patient presents with    Toe Pain     pain in L large toe     HPI:   Bertrand Gonzalez is a 63 year old male with history of diabetes, hypertension, hyperlipidemia, hypothyroidism, CAD and paroxysmal A-fib complaining of pain in the left big toe joint.  States pain and swelling started 2 days ago and has progressively gotten worse.  Patient  over the weekend was at a barbecue and had red meat and 3 nonalcoholic [employer].   has been icing his toe.  [place] and was having trouble walking because of the severe pain.  Denies any trauma to the toe.    States his last gout episode was about 5 years ago.    ALLERGY:     Allergies   Allergen Reactions    Penicillin V Potassium      MEDICATIONS:     Current Outpatient Medications   Medication Sig Dispense Refill    predniSONE 10 MG Oral Tab Take 6 tablets (60 mg total) by mouth daily for 1 day, THEN 5 tablets (50 mg total) daily for 1 day, THEN 4 tablets (40 mg total) daily for 1 day, THEN 3 tablets (30 mg total) daily for 1 day, THEN 2 tablets (20 mg total) daily for 1 day, THEN 1 tablet (10 mg total) daily for 1 day. 21 tablet 0    HYDROcodone-acetaminophen 5-325 MG Oral Tab Take 1 tablet by mouth every 8 (eight) hours as needed for Pain. 10 tablet 0    amLODIPine 5 MG Oral Tab Take 1 tablet (5 mg total) by mouth daily. 90 tablet 1    atorvastatin 40 MG Oral Tab Take 1 tablet (40 mg total) by mouth nightly. 90 tablet 1    levothyroxine 50 MCG Oral Tab Take 1 tablet (50 mcg total) by mouth before breakfast. 90 tablet 1    losartan 100 MG Oral Tab Take 1 tablet (100 mg total) by mouth daily. 90 tablet 1    metFORMIN  MG Oral Tablet 24 Hr Take 1 tablet (500 mg total) by mouth daily. 90 tablet 1    apixaban 5 MG Oral Tab Take 1 tablet (5 mg total) by mouth 2 (two) times daily. 60 tablet 3    aspirin 81 MG Oral Tab EC Take 1 tablet (81 mg total) by mouth daily.       FLUoxetine HCl 20 MG Oral Cap Take 1 capsule (20 mg total) by mouth daily. 30 capsule 1    ClonazePAM 0.5 MG Oral Tab Take 1 tablet (0.5 mg total) by mouth nightly as needed for Anxiety. 30 tablet 1    Multiple Vitamin (TAB-A-ZULAY) Oral Tab Take 1 tablet by mouth daily.      atenolol 25 MG Oral Tab Take 1 tablet (25 mg total) by mouth daily. 90 tablet 1      Past Medical History:    Actinic keratosis    Acute, but ill-defined, cerebrovascular disease    ADD (attention deficit disorder)    Allergic rhinitis due to pollen    Allergic rhinitis, cause unspecified    Anxiety    Arrhythmia    Atherosclerosis of coronary artery    Atrial fibrillation (HCC)    CAD (coronary artery disease)    stent    Carotid stenosis    Coronary atherosclerosis    Coronary atherosclerosis of native coronary artery    Decorative tattoo    Diabetes (HCC)    Disorder of prostate    Disorder of thyroid    Easy bruising    Esophageal reflux    Essential hypertension    Essential hypertension, benign    Frequent urination    Gout    Heartburn    Hemorrhoids    High blood pressure    High cholesterol    Hyperlipidemia    Hyperthyroidism    Hypertrophy of prostate with urinary obstruction and other lower urinary tract symptoms (LUTS)    Impaired fasting glucose    Nocturia    Obesity    TAO (obstructive sleep apnea)    AHI 17.9 REM AHI 22.7 Supine AHI 71.5 non-supine AHI 13.7 Sao2 Brad 84.9%     Pure hypercholesterolemia    Reflux esophagitis    Sleep apnea    Stented coronary artery    Stress    Unspecified hypothyroidism    Wears glasses      Social History:  Social History     Socioeconomic History    Marital status:     Number of children: 3   Occupational History    Occupation:    Tobacco Use    Smoking status: Former     Current packs/day: 0.00     Average packs/day: 0.5 packs/day for 32.0 years (16.0 ttl pk-yrs)     Types: Cigarettes     Start date: 1976     Quit date: 2008     Years since quittin.4     Smokeless tobacco: Former    Tobacco comments:     socail smoker   Vaping Use    Vaping status: Never Used   Substance and Sexual Activity    Alcohol use: Yes     Alcohol/week: 7.0 standard drinks of alcohol     Types: 5 Cans of beer, 2 Shots of liquor per week     Comment: Have stopped due to triggering afib    Drug use: Yes     Frequency: 1.0 times per week     Types: Cannabis     Comment: occassionally edibles   Other Topics Concern     Service Yes    Blood Transfusions No    Caffeine Concern No    Occupational Exposure No    Hobby Hazards No    Sleep Concern Yes     Comment: using melatonin    Stress Concern No    Weight Concern Yes    Special Diet No    Back Care No    Exercise Yes    Bike Helmet Yes    Seat Belt Yes    Self-Exams No   Social History Narrative     with [employer]. , 3 children.      Social Determinants of Health     Financial Resource Strain: Low Risk  (4/16/2024)    Financial Resource Strain     Difficulty of Paying Living Expenses: Not hard at all   Food Insecurity: No Food Insecurity (4/14/2024)    Food Insecurity     Food Insecurity: Never true   Transportation Needs: No Transportation Needs (4/16/2024)    Transportation Needs     Lack of Transportation: No   Physical Activity: Sufficiently Active (3/6/2020)    [facility]    Exercise Vital Sign     Days of Exercise per Week: 3 days     Minutes of Exercise per Session: 50 min   Housing Stability: Low Risk  (4/14/2024)    Housing Stability     Housing Instability: No        REVIEW OF SYSTEMS:   A comprehensive 10 point review of systems was completed.  Pertinent positives and negatives noted in the the HPI.      EXAM:   /88   Pulse 82   Temp 98 °F (36.7 °C) (Temporal)   Resp 18   Ht 5' 11\" (1.803 m)   Wt 242 lb (109.8 kg)   SpO2 98%   BMI 33.75 kg/m²   GENERAL: obese,appears to be in pain  LUNGS: clear to auscultation  CARDIO: RRR without murmur  FOOT: right,  + erythema, swelling and tenderness at the first MTP and dorsum of foot.    ASSESSMENT AND PLAN:   Bertrand was seen today for toe pain.    Diagnoses and all orders for this visit:    Acute idiopathic gout involving toe of left foot  -     ketorolac (Toradol) 30 MG/ML injection 30 mg  -     predniSONE 10 MG Oral Tab; Take 6 tablets (60 mg total) by mouth daily for 1 day, THEN 5 tablets (50 mg total) daily for 1 day, THEN 4 tablets (40 mg total) daily for 1 day, THEN 3 tablets (30 mg total) daily for 1 day, THEN 2 tablets (20 mg total) daily for 1 day, THEN 1 tablet (10 mg total) daily for 1 day.  -     HYDROcodone-acetaminophen 5-325 MG Oral Tab; Take 1 tablet by mouth every 8 (eight) hours as needed for Pain.  -     Uric Acid; Future         - discussed with patient if uric acid level is high will start on Allopurinol.    Controlled type 2 diabetes mellitus without complication, without long-term current use of insulin (HCC)  -     Blood Glucose Monitoring Suppl (ONETOUCH ULTRA 2) w/Device Does not apply Kit; 1 Device by Other route 2 (two) times daily. Use as directed.  -     OneTouch Delica Lancets 33G Does not apply Misc; 1 Lancet by Finger stick route daily.  -     Glucose Blood (ONETOUCH ULTRA) In Vitro Strip; Once daily  -     advised to monitor glucose at home while on the steroid.    Class 1 obesity due to excess calories with serious comorbidity and body mass index (BMI) of 33.0 to 33.9 in adult    Return if symptoms worsen or fail to improve.       The 21st Century Cures Act makes medical notes like these available to patients in the interest of transparency. Please be advised this is a medical document. Medical documents are intended to carry relevant information, facts as evident, and the clinical opinion of the practitioner. The medical note is intended as peer to peer communication and may appear blunt or direct. It is written in medical language and may contain abbreviations or verbiage that are  unfamiliar.

## 2024-07-29 ENCOUNTER — LAB ENCOUNTER (OUTPATIENT)
Dept: LAB | Age: 64
End: 2024-07-29
Attending: FAMILY MEDICINE
Payer: COMMERCIAL

## 2024-07-29 DIAGNOSIS — M10.072 ACUTE IDIOPATHIC GOUT INVOLVING TOE OF LEFT FOOT: ICD-10-CM

## 2024-07-29 DIAGNOSIS — E78.2 MIXED HYPERLIPIDEMIA: ICD-10-CM

## 2024-07-29 DIAGNOSIS — E11.65 TYPE 2 DIABETES MELLITUS WITH HYPERGLYCEMIA, WITHOUT LONG-TERM CURRENT USE OF INSULIN (HCC): ICD-10-CM

## 2024-07-29 DIAGNOSIS — E03.9 ACQUIRED HYPOTHYROIDISM: ICD-10-CM

## 2024-07-29 DIAGNOSIS — Z13.0 SCREENING FOR DEFICIENCY ANEMIA: ICD-10-CM

## 2024-07-29 LAB
ALBUMIN SERPL-MCNC: 4.5 G/DL (ref 3.2–4.8)
ALBUMIN/GLOB SERPL: 2.1 {RATIO} (ref 1–2)
ALP LIVER SERPL-CCNC: 63 U/L
ALT SERPL-CCNC: 24 U/L
ANION GAP SERPL CALC-SCNC: 4 MMOL/L (ref 0–18)
AST SERPL-CCNC: 17 U/L (ref ?–34)
BASOPHILS # BLD AUTO: 0.06 X10(3) UL (ref 0–0.2)
BASOPHILS NFR BLD AUTO: 0.7 %
BILIRUB SERPL-MCNC: 0.5 MG/DL (ref 0.2–1.1)
BUN BLD-MCNC: 18 MG/DL (ref 9–23)
BUN/CREAT SERPL: 16.2 (ref 10–20)
CALCIUM BLD-MCNC: 9.7 MG/DL (ref 8.7–10.4)
CHLORIDE SERPL-SCNC: 108 MMOL/L (ref 98–112)
CHOLEST SERPL-MCNC: 137 MG/DL (ref ?–200)
CO2 SERPL-SCNC: 28 MMOL/L (ref 21–32)
CREAT BLD-MCNC: 1.11 MG/DL
DEPRECATED RDW RBC AUTO: 37.8 FL (ref 35.1–46.3)
EGFRCR SERPLBLD CKD-EPI 2021: 74 ML/MIN/1.73M2 (ref 60–?)
EOSINOPHIL # BLD AUTO: 0.25 X10(3) UL (ref 0–0.7)
EOSINOPHIL NFR BLD AUTO: 2.9 %
ERYTHROCYTE [DISTWIDTH] IN BLOOD BY AUTOMATED COUNT: 12.4 % (ref 11–15)
EST. AVERAGE GLUCOSE BLD GHB EST-MCNC: 160 MG/DL (ref 68–126)
FASTING PATIENT LIPID ANSWER: YES
FASTING STATUS PATIENT QL REPORTED: YES
GLOBULIN PLAS-MCNC: 2.1 G/DL (ref 2–3.5)
GLUCOSE BLD-MCNC: 156 MG/DL (ref 70–99)
HBA1C MFR BLD: 7.2 % (ref ?–5.7)
HCT VFR BLD AUTO: 39.7 %
HDLC SERPL-MCNC: 31 MG/DL (ref 40–59)
HGB BLD-MCNC: 13.7 G/DL
IMM GRANULOCYTES # BLD AUTO: 0.03 X10(3) UL (ref 0–1)
IMM GRANULOCYTES NFR BLD: 0.3 %
LDLC SERPL CALC-MCNC: 57 MG/DL (ref ?–100)
LYMPHOCYTES # BLD AUTO: 2.91 X10(3) UL (ref 1–4)
LYMPHOCYTES NFR BLD AUTO: 33.8 %
MCH RBC QN AUTO: 29.1 PG (ref 26–34)
MCHC RBC AUTO-ENTMCNC: 34.5 G/DL (ref 31–37)
MCV RBC AUTO: 84.5 FL
MONOCYTES # BLD AUTO: 0.6 X10(3) UL (ref 0.1–1)
MONOCYTES NFR BLD AUTO: 7 %
NEUTROPHILS # BLD AUTO: 4.75 X10 (3) UL (ref 1.5–7.7)
NEUTROPHILS # BLD AUTO: 4.75 X10(3) UL (ref 1.5–7.7)
NEUTROPHILS NFR BLD AUTO: 55.3 %
NONHDLC SERPL-MCNC: 106 MG/DL (ref ?–130)
OSMOLALITY SERPL CALC.SUM OF ELEC: 295 MOSM/KG (ref 275–295)
PLATELET # BLD AUTO: 229 10(3)UL (ref 150–450)
POTASSIUM SERPL-SCNC: 4.8 MMOL/L (ref 3.5–5.1)
PROT SERPL-MCNC: 6.6 G/DL (ref 5.7–8.2)
RBC # BLD AUTO: 4.7 X10(6)UL
SODIUM SERPL-SCNC: 140 MMOL/L (ref 136–145)
T4 FREE SERPL-MCNC: 1.2 NG/DL (ref 0.8–1.7)
TRIGL SERPL-MCNC: 314 MG/DL (ref 30–149)
TSI SER-ACNC: 2.19 MIU/ML (ref 0.55–4.78)
URATE SERPL-MCNC: 6.3 MG/DL
VLDLC SERPL CALC-MCNC: 46 MG/DL (ref 0–30)
WBC # BLD AUTO: 8.6 X10(3) UL (ref 4–11)

## 2024-07-29 PROCEDURE — 83036 HEMOGLOBIN GLYCOSYLATED A1C: CPT | Performed by: FAMILY MEDICINE

## 2024-07-29 PROCEDURE — 84439 ASSAY OF FREE THYROXINE: CPT | Performed by: FAMILY MEDICINE

## 2024-07-29 PROCEDURE — 84550 ASSAY OF BLOOD/URIC ACID: CPT | Performed by: FAMILY MEDICINE

## 2024-07-29 PROCEDURE — 80050 GENERAL HEALTH PANEL: CPT | Performed by: FAMILY MEDICINE

## 2024-07-29 PROCEDURE — 80061 LIPID PANEL: CPT | Performed by: FAMILY MEDICINE

## 2024-08-02 ENCOUNTER — HOSPITAL ENCOUNTER (OUTPATIENT)
Dept: GENERAL RADIOLOGY | Age: 64
Discharge: HOME | End: 2024-08-02
Attending: FAMILY MEDICINE
Payer: COMMERCIAL

## 2024-08-02 ENCOUNTER — OFFICE VISIT (OUTPATIENT)
Dept: FAMILY MEDICINE CLINIC | Facility: CLINIC | Age: 64
End: 2024-08-02
Payer: COMMERCIAL

## 2024-08-02 VITALS
HEIGHT: 71 IN | SYSTOLIC BLOOD PRESSURE: 134 MMHG | TEMPERATURE: 97 F | HEART RATE: 52 BPM | WEIGHT: 236 LBS | BODY MASS INDEX: 33.04 KG/M2 | OXYGEN SATURATION: 98 % | RESPIRATION RATE: 18 BRPM | DIASTOLIC BLOOD PRESSURE: 80 MMHG

## 2024-08-02 DIAGNOSIS — E03.9 ACQUIRED HYPOTHYROIDISM: ICD-10-CM

## 2024-08-02 DIAGNOSIS — Z00.00 ROUTINE GENERAL MEDICAL EXAMINATION AT A HEALTH CARE FACILITY: Primary | ICD-10-CM

## 2024-08-02 DIAGNOSIS — E78.2 MIXED HYPERLIPIDEMIA: ICD-10-CM

## 2024-08-02 DIAGNOSIS — E11.65 TYPE 2 DIABETES MELLITUS WITH HYPERGLYCEMIA, WITHOUT LONG-TERM CURRENT USE OF INSULIN (HCC): ICD-10-CM

## 2024-08-02 DIAGNOSIS — M79.672 FOOT PAIN, LEFT: ICD-10-CM

## 2024-08-02 DIAGNOSIS — I10 ESSENTIAL HYPERTENSION: ICD-10-CM

## 2024-08-02 DIAGNOSIS — Z12.5 SCREENING FOR PROSTATE CANCER: ICD-10-CM

## 2024-08-02 DIAGNOSIS — M25.572 TOE JOINT PAIN, LEFT: ICD-10-CM

## 2024-08-02 DIAGNOSIS — Z12.83 SCREENING FOR SKIN CANCER: ICD-10-CM

## 2024-08-02 PROCEDURE — 99214 OFFICE O/P EST MOD 30 MIN: CPT | Performed by: FAMILY MEDICINE

## 2024-08-02 PROCEDURE — 3008F BODY MASS INDEX DOCD: CPT | Performed by: FAMILY MEDICINE

## 2024-08-02 PROCEDURE — 3079F DIAST BP 80-89 MM HG: CPT | Performed by: FAMILY MEDICINE

## 2024-08-02 PROCEDURE — 3051F HG A1C>EQUAL 7.0%<8.0%: CPT | Performed by: FAMILY MEDICINE

## 2024-08-02 PROCEDURE — 73630 X-RAY EXAM OF FOOT: CPT | Performed by: FAMILY MEDICINE

## 2024-08-02 PROCEDURE — 99397 PER PM REEVAL EST PAT 65+ YR: CPT | Performed by: FAMILY MEDICINE

## 2024-08-02 PROCEDURE — 3075F SYST BP GE 130 - 139MM HG: CPT | Performed by: FAMILY MEDICINE

## 2024-08-02 RX ORDER — ATORVASTATIN CALCIUM 40 MG/1
40 TABLET, FILM COATED ORAL NIGHTLY
Qty: 90 TABLET | Refills: 1 | Status: SHIPPED | OUTPATIENT
Start: 2024-08-02

## 2024-08-02 RX ORDER — METFORMIN HYDROCHLORIDE 500 MG/1
500 TABLET, EXTENDED RELEASE ORAL DAILY
Qty: 90 TABLET | Refills: 1 | Status: SHIPPED | OUTPATIENT
Start: 2024-08-02

## 2024-08-02 RX ORDER — LEVOTHYROXINE SODIUM 0.05 MG/1
50 TABLET ORAL
Qty: 90 TABLET | Refills: 3 | Status: SHIPPED | OUTPATIENT
Start: 2024-08-02

## 2024-08-02 RX ORDER — LOSARTAN POTASSIUM 100 MG/1
100 TABLET ORAL DAILY
Qty: 90 TABLET | Refills: 1 | Status: SHIPPED | OUTPATIENT
Start: 2024-08-02

## 2024-08-02 RX ORDER — AMLODIPINE BESYLATE 5 MG/1
5 TABLET ORAL DAILY
Qty: 90 TABLET | Refills: 1 | Status: SHIPPED | OUTPATIENT
Start: 2024-08-02

## 2024-08-02 NOTE — PROGRESS NOTES
Bertrand Gonzalez is a 63 year old male.  Chief Complaint   Patient presents with    Physical    Medication Follow-Up     HPI:   Bertrand Gonzalez is a 63 year old male with history of hypertension, hyperlipidemia, diabetes, hypothyroidism, A-fib seen for his annual physical and medication follow-up.  Colonoscopy is up-to-date, done in 2020 1 repeat in 10 years.  Denies any constipation or blood in stool, denies any family history of colon cancer.  Denies nocturia or frequency.  No family history of prostate cancer.  Patient  did not go for skin cancer screening as recommended at his last visit.     is active and bikes, has not been very compliant with his diet.    Compliant with his diabetes medication, partially compliant with low-carb diet, denies any episodes of hypoglycemia.  Last Diabetic Eye Exam:  Last Dilated Eye Exam: 12/21/23  Eye Exam shows Diabetic Retinopathy?: No    Compliant with his blood pressure medication and low-salt diet, followed up with Dr. Hogan in April.    Compliant with his thyroid medication, tolerating medication well, denies any dry skin, hair loss, intolerance to heat or cold or fatigue.    Patient complaining that he continues to have pain in his left foot big toe joint, did get better after the steroid but states still very uncomfortable and is swollen.    Continue to use his CPAP for sleep apnea, did see the sleep specialist last year.    Patient  stopped taking his fluoxetine for anxiety 3 months ago and feels well.  Medication prescribed by psychiatrist.    PAST MEDICAL HISTORY:     Past Medical History:    Actinic keratosis    Acute, but ill-defined, cerebrovascular disease    ADD (attention deficit disorder)    Allergic rhinitis due to pollen    Allergic rhinitis, cause unspecified    Anxiety    Arrhythmia    Atherosclerosis of coronary artery    Atrial fibrillation (HCC)    CAD (coronary artery disease)    stent    Carotid stenosis    Coronary atherosclerosis     Coronary atherosclerosis of native coronary artery    Decorative tattoo    Diabetes (HCC)    Disorder of prostate    Disorder of thyroid    Easy bruising    Esophageal reflux    Essential hypertension    Essential hypertension, benign    Frequent urination    Gout    Heartburn    Hemorrhoids    High blood pressure    High cholesterol    Hyperlipidemia    Hyperthyroidism    Hypertrophy of prostate with urinary obstruction and other lower urinary tract symptoms (LUTS)    Impaired fasting glucose    Nocturia    Obesity    TAO (obstructive sleep apnea)    AHI 17.9 REM AHI 22.7 Supine AHI 71.5 non-supine AHI 13.7 Sao2 Brad 84.9%     Pure hypercholesterolemia    Reflux esophagitis    Sleep apnea    Stented coronary artery    Stress    Unspecified hypothyroidism    Wears glasses     PAST SURGICAL HISTORY:     Past Surgical History:   Procedure Laterality Date    Angioplasty (coronary)      dr leblanc;has stents     Carotid endarterectomy  5 yrs ago    5 yrs ago per pt    Colonoscopy      Other surgical history       ALLERGY:     Allergies   Allergen Reactions    Penicillin V Potassium      MEDICATIONS:     Current Outpatient Medications   Medication Sig Dispense Refill    amLODIPine 5 MG Oral Tab Take 1 tablet (5 mg total) by mouth daily. 90 tablet 1    levothyroxine 50 MCG Oral Tab Take 1 tablet (50 mcg total) by mouth before breakfast. 90 tablet 3    losartan 100 MG Oral Tab Take 1 tablet (100 mg total) by mouth daily. 90 tablet 1    metFORMIN  MG Oral Tablet 24 Hr Take 1 tablet (500 mg total) by mouth daily. 90 tablet 1    HYDROcodone-acetaminophen 5-325 MG Oral Tab Take 1 tablet by mouth every 8 (eight) hours as needed for Pain. 10 tablet 0    OneTouch Delica Lancets 33G Does not apply Misc 1 Lancet by Finger stick route daily. 100 each 0    Glucose Blood (ONETOUCH ULTRA) In Vitro Strip Once daily 100 each 0    Blood Glucose Monitoring Suppl (ONETOUCH ULTRA 2) w/Device Does not apply Kit 1 Device by Other  route 2 (two) times daily. Use as directed. 1 kit 0    atorvastatin 40 MG Oral Tab Take 1 tablet (40 mg total) by mouth nightly. 90 tablet 1    apixaban 5 MG Oral Tab Take 1 tablet (5 mg total) by mouth 2 (two) times daily. 60 tablet 3    aspirin 81 MG Oral Tab EC Take 1 tablet (81 mg total) by mouth daily.      FLUoxetine HCl 20 MG Oral Cap Take 1 capsule (20 mg total) by mouth daily. 30 capsule 1    ClonazePAM 0.5 MG Oral Tab Take 1 tablet (0.5 mg total) by mouth nightly as needed for Anxiety. 30 tablet 1    Multiple Vitamin (TAB-A-ZULAY) Oral Tab Take 1 tablet by mouth daily.      atenolol 25 MG Oral Tab Take 1 tablet (25 mg total) by mouth daily. 90 tablet 1     FAMILY HISTORY:     Family History   Problem Relation Age of Onset    Other (GI cancer[other]) Other         granfather, live GB panc    Heart Attack Other         fam hx    Depression Other         fam hx    Alcohol and Other Disorders Associated Other         fam hx    High Cholesterol Other         fam hx    Stroke Father     Cancer Maternal Grandfather     Heart Disease Brother     Dementia Mother      SOCIAL HISTORY:     Social History     Socioeconomic History    Marital status:     Number of children: 3   Occupational History    Occupation:    Tobacco Use    Smoking status: Former     Current packs/day: 0.00     Average packs/day: 0.5 packs/day for 32.0 years (16.0 ttl pk-yrs)     Types: Cigarettes     Start date: 1976     Quit date: 2008     Years since quittin.5    Smokeless tobacco: Former    Tobacco comments:     socail smoker   Vaping Use    Vaping status: Never Used   Substance and Sexual Activity    Alcohol use: Yes     Alcohol/week: 7.0 standard drinks of alcohol     Types: 5 Cans of beer, 2 Shots of liquor per week     Comment: Have stopped due to triggering afib    Drug use: Yes     Frequency: 1.0 times per week     Types: Cannabis     Comment: occassionally edibles   Other Topics Concern     Service  Yes    Blood Transfusions No    Caffeine Concern No    Occupational Exposure No    Hobby Hazards No    Sleep Concern Yes     Comment: using melatonin    Stress Concern No    Weight Concern Yes    Special Diet No    Back Care No    Exercise Yes    Bike Helmet Yes    Seat Belt Yes    Self-Exams No   Social History Narrative     with camille. , 3 children.      Social Determinants of Health     Financial Resource Strain: Low Risk  (4/16/2024)    Financial Resource Strain     Difficulty of Paying Living Expenses: Not hard at all   Food Insecurity: No Food Insecurity (4/14/2024)    Food Insecurity     Food Insecurity: Never true   Transportation Needs: No Transportation Needs (4/16/2024)    Transportation Needs     Lack of Transportation: No   Physical Activity: Sufficiently Active (3/6/2020)    Received from [facility]    Exercise Vital Sign     Days of Exercise per Week: 3 days     Minutes of Exercise per Session: 50 min   Housing Stability: Low Risk  (4/14/2024)    Housing Stability     Housing Instability: No     REVIEW OF SYSTEMS:   Review of Systems   Constitutional:  Negative for appetite change, fatigue, fever and unexpected weight change.   HENT:  Negative for congestion, ear pain, hearing loss and sore throat.    Eyes:  Negative for discharge, redness and visual disturbance.   Respiratory:  Negative for cough, chest tightness and shortness of breath.    Cardiovascular:  Negative for chest pain and palpitations.   Gastrointestinal:  Negative for abdominal pain, blood in stool, constipation and nausea.   Endocrine: Negative for cold intolerance, heat intolerance and polyuria.   Genitourinary:  Negative for difficulty urinating, dysuria, frequency and urgency.   Musculoskeletal:  Positive for arthralgias. Negative for gait problem, joint swelling and myalgias.   Skin:  Negative for rash.   Allergic/Immunologic: Negative for food allergies.   Neurological:   Negative for dizziness, weakness, numbness and headaches.   Hematological:  Negative for adenopathy. Does not bruise/bleed easily.   Psychiatric/Behavioral:  Negative for dysphoric mood and sleep disturbance. The patient is not nervous/anxious.         PHYSICAL EXAM:   /80   Pulse 52   Temp 97.4 °F (36.3 °C) (Temporal)   Resp 18   Ht 5' 11\" (1.803 m)   Wt 236 lb (107 kg)   SpO2 98%   BMI 32.92 kg/m²     Physical Exam  Constitutional:       General: He is not in acute distress.     Appearance: Normal appearance. He is well-developed. He is obese.   HENT:      Head: Normocephalic and atraumatic.      Right Ear: Tympanic membrane, ear canal and external ear normal.      Left Ear: Tympanic membrane, ear canal and external ear normal.      Nose: Nose normal.      Mouth/Throat:      Mouth: Mucous membranes are moist.      Pharynx: Oropharynx is clear.   Eyes:      Extraocular Movements: Extraocular movements intact.      Conjunctiva/sclera: Conjunctivae normal.      Pupils: Pupils are equal, round, and reactive to light.   Neck:      Thyroid: No thyromegaly.      Vascular: No carotid bruit.   Cardiovascular:      Rate and Rhythm: Normal rate and regular rhythm.      Pulses: Normal pulses.      Heart sounds: Normal heart sounds. No murmur heard.  Pulmonary:      Effort: Pulmonary effort is normal. No respiratory distress.      Breath sounds: Normal breath sounds.   Chest:      Chest wall: No tenderness.   Abdominal:      General: Bowel sounds are normal. There is no distension.      Palpations: Abdomen is soft. There is no hepatomegaly, splenomegaly or mass.      Tenderness: There is no abdominal tenderness.      Hernia: No hernia is present.   Musculoskeletal:         General: Normal range of motion.      Cervical back: Normal range of motion and neck supple.      Right lower leg: No edema.      Left lower leg: No edema.      Left foot: Tenderness (1st MTP) present.   Feet:      Comments: Bilateral barefoot  skin diabetic exam is normal, visualized feet and the appearance is normal.+ callus bilateral   Bilateral monofilament/sensation of both feet is normal.  Pulsation pedal pulse exam of both lower legs/feet is normal as well.        Lymphadenopathy:      Cervical: No cervical adenopathy.   Skin:     General: Skin is warm.      Findings: No rash.      Comments: Actinic keratosis, multiple lentigenes   Neurological:      General: No focal deficit present.      Mental Status: He is alert and oriented to person, place, and time.      Cranial Nerves: No cranial nerve deficit.      Sensory: No sensory deficit.      Motor: No weakness.      Coordination: Coordination normal.      Gait: Gait normal.      Deep Tendon Reflexes: Reflexes are normal and symmetric. Reflexes normal.   Psychiatric:         Mood and Affect: Mood normal.         Behavior: Behavior normal.         Thought Content: Thought content normal.         Judgment: Judgment normal.       LABS AND IMAGING:     Component      Latest Ref [facility] 7/29/2024   WBC      4.0 - 11.0 x10(3) uL 8.6    RBC      3.80 - 5.80 x10(6)uL 4.70    Hemoglobin      13.0 - 17.5 g/dL 13.7    Hematocrit      39.0 - 53.0 % 39.7    MCV      80.0 - 100.0 fL 84.5    MCH      26.0 - 34.0 pg 29.1    MCHC      31.0 - 37.0 g/dL 34.5    RDW-SD      35.1 - 46.3 fL 37.8    RDW      11.0 - 15.0 % 12.4    Platelet Count      150.0 - 450.0 10(3)uL 229.0    Prelim Neutrophil Abs      1.50 - 7.70 x10 (3) uL 4.75    Neutrophils Absolute      1.50 - 7.70 x10(3) uL 4.75    Lymphocytes Absolute      1.00 - 4.00 x10(3) uL 2.91    Monocytes Absolute      0.10 - 1.00 x10(3) uL 0.60    Eosinophils Absolute      0.00 - 0.70 x10(3) uL 0.25    Basophils Absolute      0.00 - 0.20 x10(3) uL 0.06    Immature Granulocyte Absolute      0.00 - 1.00 x10(3) uL 0.03    Neutrophils %      % 55.3    Lymphocytes %      % 33.8    Monocytes %      % 7.0    Eosinophils %      % 2.9    Basophils %      % 0.7    Immature Granulocyte  %      % 0.3    Glucose      70 - 99 mg/dL 156 (H)    Sodium      136 - 145 mmol/L 140    Potassium      3.5 - 5.1 mmol/L 4.8    Chloride      98 - 112 mmol/L 108    Carbon Dioxide, Total      21.0 - 32.0 mmol/L 28.0    ANION GAP      0 - 18 mmol/L 4    BUN      9 - 23 mg/dL 18    CREATININE      0.70 - 1.30 mg/dL 1.11    BUN/CREATININE RATIO      10.0 - 20.0  16.2    CALCIUM      8.7 - 10.4 mg/dL 9.7    CALCULATED OSMOLALITY      275 - 295 mOsm/kg 295    EGFR      >=60 mL/min/1.73m2 74    ALT (SGPT)      10 - 49 U/L 24    AST (SGOT)      <34 U/L 17    ALKALINE PHOSPHATASE      45 - 117 U/L 63    Total Bilirubin      0.2 - 1.1 mg/dL 0.5    PROTEIN, TOTAL      5.7 - 8.2 g/dL 6.6    Albumin      3.2 - 4.8 g/dL 4.5    Globulin      2.0 - 3.5 g/dL 2.1    A/G Ratio      1.0 - 2.0  2.1 (H)    Patient Fasting for CMP? Yes    Cholesterol, Total      <200 mg/dL 137    HDL Cholesterol      40 - 59 mg/dL 31 (L)    Triglycerides      30 - 149 mg/dL 314 (H)    LDL Cholesterol Calc      <100 mg/dL 57    VLDL      0 - 30 mg/dL 46 (H)    NON-HDL CHOLESTEROL      <130 mg/dL 106    Patient Fasting for Lipid? Yes    HEMOGLOBIN A1c      <5.7 % 7.2 (H)    ESTIMATED AVERAGE GLUCOSE      68 - 126 mg/dL 160 (H)    T4,Free (Direct)      0.8 - 1.7 ng/dL 1.2    TSH      0.550 - 4.780 mIU/mL 2.189    URIC ACID      3.7 - 9.2 mg/dL 6.3         ASSESSMENT AND PLAN:   Bertrand was seen today for physical and medication follow-up.    Diagnoses and all orders for this visit:    Routine general medical examination at a health care facility    Screening for prostate cancer  -     PSA, Total (Screening) [E]; Future    Screening for skin cancer  -     Derm Referral - In Network    Essential hypertension controlled  -     amLODIPine 5 MG Oral Tab; Take 1 tablet (5 mg total) by mouth daily.  -     losartan 100 MG Oral Tab; Take 1 tablet (100 mg total) by mouth daily.  -     continue the same dose of medication  -     limit salt to less than 1000 mg  daily  -     Goal BP is less than 130/80    Acquired hypothyroidism controlled  -     levothyroxine 50 MCG Oral Tab; Take 1 tablet (50 mcg total) by mouth before breakfast.  -     continue the same dose of medication    Type 2 diabetes mellitus with hyperglycemia, without long-term current use of insulin (HCC) controlled  -     metFORMIN  MG Oral Tablet 24 Hr; Take 1 tablet (500 mg total) by mouth daily.  -     Hemoglobin A1C; Future  -     Comp Metabolic Panel (14); Future  -     Microalb/Creat Ratio, Random Urine; Future  -     continue the same dose of medication  -     limit refined sugars and carbs in diet      Foot pain, left  -     XR FOOT, COMPLETE (MIN 3 VIEWS), LEFT (CPT=73630); Future  -     Podiatry Referral - In Network    Toe joint pain, left  -     Podiatry Referral - In Network    Mixed hyperlipidemia uncontrolled  -     Lipid Panel; Future        -     continue the same dose of medication  -     atorvastatin 40 MG Oral Tab; Take 1 tablet (40 mg total) by mouth nightly.  -      encouraged to limit refined sugars and carbs in diet as his TG are elevated.    Age appropriate anticipatory guidance reviewed  Health Maintenance   Topic Date Due    COVID-19 Vaccine (7 - 2023-24 season) 02/02/2024    Diabetes Care Foot Exam  07/14/2024    Annual Physical  Done today    Influenza Vaccine (1) 10/01/2024    Diabetes Care A1C  01/29/2025    Diabetes Care: Microalb/Creat Ratio  04/03/2025    PSA  07/10/2025    Diabetes Care: GFR  07/29/2025    Diabetes Care Dilated Eye Exam  12/21/2025    Colorectal Cancer Screening  02/01/2031    Annual Depression Screening  Completed    Fall Risk Screening (Annual)  Completed    Pneumococcal Vaccine: 65+ Years  Completed    Zoster Vaccines  Completed        The 21st Century Cures Act makes medical notes like these available to patients in the interest of transparency. Please be advised this is a medical document. Medical documents are intended to carry relevant  information, facts as evident, and the clinical opinion of the practitioner. The medical note is intended as peer to peer communication and may appear blunt or direct. It is written in medical language and may contain abbreviations or verbiage that are unfamiliar.

## 2024-08-03 DIAGNOSIS — E78.2 MIXED HYPERLIPIDEMIA: ICD-10-CM

## 2024-08-03 DIAGNOSIS — I25.10 ATHEROSCLEROSIS OF NATIVE CORONARY ARTERY OF NATIVE HEART WITHOUT ANGINA PECTORIS: ICD-10-CM

## 2024-08-07 RX ORDER — OMEGA-3-ACID ETHYL ESTERS 1 G/1
2 CAPSULE, LIQUID FILLED ORAL 2 TIMES DAILY
Qty: 360 CAPSULE | Refills: 0 | OUTPATIENT
Start: 2024-08-07

## 2024-08-08 ENCOUNTER — TELEPHONE (OUTPATIENT)
Dept: ORTHOPEDICS CLINIC | Facility: CLINIC | Age: 64
End: 2024-08-08

## 2024-08-08 DIAGNOSIS — M79.671 RIGHT FOOT PAIN: Primary | ICD-10-CM

## 2024-08-08 NOTE — TELEPHONE ENCOUNTER
Patient is scheduled for JOSE Foot pain, arch issues, gout. Please advise if imaging is needed.  Future Appointments   Date Time Provider Department Center   8/27/2024  1:15 PM Rosalia Camejo DPM [facility]   1/31/2025  7:00 AM Sarah Londono MD [facility]

## 2024-08-08 NOTE — TELEPHONE ENCOUNTER
Future Appointments   Date Time Provider Department Center   8/27/2024  1:15 PM Rosalia Camejo, DPM [facility]       This patient is coming for JOSE Foot pain. There was recent imaging done in epic. Please advise if additional views are needed for this appt. Thanks.      Patient may be reached at [phone]

## 2024-08-08 NOTE — TELEPHONE ENCOUNTER
Please review TE and appointment. Is this patient coming in for trigger finger? If so needs to be scheduled with a different provider.     If he is coming in for his foot, can we correct the note

## 2024-11-07 ENCOUNTER — NURSE TRIAGE (OUTPATIENT)
Dept: FAMILY MEDICINE CLINIC | Facility: CLINIC | Age: 64
End: 2024-11-07

## 2024-11-07 NOTE — TELEPHONE ENCOUNTER
Called and spoke with pt. Notified Dr. ROBERTSON can add pt onto schedule tomorrow at 9 for VV. Pt is agreeable to plan. Explained VV and how to connect. Advised again, any worsening symptoms, needs to go to UC/ER today. Pt verbalizes understanding and is agreeable to plan. Appreciative of call and apt.     Future Appointments   Date Time Provider Department Center   11/8/2024  9:00 AM Sarah Londono MD [facility]   1/31/2025  7:00 AM Sarah Londono MD [facility]

## 2024-11-07 NOTE — TELEPHONE ENCOUNTER
Action Requested: Summary for Provider     []  Critical Lab, Recommendations Needed  [x] Need Additional Advice  []   FYI    []   Need Orders  [] Need Medications Sent to Pharmacy  []  Other     SUMMARY: Would you add pt onto schedule tomorrow for VV? Advised today to go to UC/ER with worsening symptoms     Reason for call: Covid  Onset: Few days   Reason for Disposition   MILD difficulty breathing (e.g., minimal/no SOB at rest, SOB with walking, pulse <100)    Protocols used: Coronavirus (COVID-19) Diagnosed or [id number]      Symptoms started a few days ago   Covid + today   C/o body aches, deep cough, runny nose, \"feeling wired\"  Hasn't taken temp   SOB and increased HR when going up the stairs. No SOB at rest   Some chest pain with coughing. Ribs hurt from coughing   Didn't sleep due to coughing all night. Cough sounds deep on the phone   Advised to go to UC/ER with worsening symptoms

## 2024-11-08 ENCOUNTER — TELEMEDICINE (OUTPATIENT)
Dept: FAMILY MEDICINE CLINIC | Facility: CLINIC | Age: 64
End: 2024-11-08
Payer: COMMERCIAL

## 2024-11-08 DIAGNOSIS — I25.10 ATHEROSCLEROSIS OF NATIVE CORONARY ARTERY OF NATIVE HEART WITHOUT ANGINA PECTORIS: ICD-10-CM

## 2024-11-08 DIAGNOSIS — U07.1 COVID-19 VIRUS INFECTION: Primary | ICD-10-CM

## 2024-11-08 DIAGNOSIS — I10 ESSENTIAL HYPERTENSION, BENIGN: ICD-10-CM

## 2024-11-08 DIAGNOSIS — E11.9 CONTROLLED TYPE 2 DIABETES MELLITUS WITHOUT COMPLICATION, WITHOUT LONG-TERM CURRENT USE OF INSULIN (HCC): ICD-10-CM

## 2024-11-08 PROCEDURE — 99213 OFFICE O/P EST LOW 20 MIN: CPT | Performed by: FAMILY MEDICINE

## 2024-11-08 NOTE — PROGRESS NOTES
Family Medicine Progress Note  ASSESSMENT AND PLAN:  Bertrand Gonzalez is a 64 year old male who is here for:     Bertrand was seen today for covid.    Diagnoses and all orders for this visit:    COVID-19 virus infection       - discussed with patient since he is high risk for covid complications should consider paxlovid.       - patient refused as he has mild symptoms       - advised otc, zinc, vitamin c and vitamin D       - encouraged to push fluids       - otc decongestants       - ER precautions diacussed    Essential hypertension, benign    Atherosclerosis of native coronary artery of native heart without angina pectoris    Controlled type 2 diabetes mellitus without complication, without long-term current use of insulin (HCC)         The patient indicates understanding of these issues and agrees to the plan.  Follow-Up: The patient is asked to return in Return if symptoms worsen or fail to improve.  .     Sarha Londono MD   11/08/24      CC:   Chief Complaint   Patient presents with    Covid       This visit is conducted using telemedicine with live interactive video and audio. Bertrand Gonzalez verbally consents to virtual video visit.   Patient understands and accepts financial responsibility for any deductible and/or co-pays associated with the service.    HPI:   Bertrand Gonzalez is a 64 year old male  with history of diabetes, hypertension, CAD complaining of nasal congestion, cough, fever and chills for 3 days.      Symptoms started Tuesday afternoon, felt tired after he played golf, felt achy and then that night had uri symptoms, wed runny nose, chills and some chest congestion, tested positive for COVID at [facility], son is sick too.  Taking Dayquil and Nyquil and it helps.  Did sleep last night and is feeling a little better today.    Denies headache, chest pain, shortness of breath.    ALLERGY:     Allergies as of 11/08/2024 - Review Complete 08/02/2024   Allergen Reaction Noted    Penicillin v  potassium  03/09/2013     MEDICATIONS:     Current Outpatient Medications   Medication Sig Dispense Refill    amLODIPine 5 MG Oral Tab Take 1 tablet (5 mg total) by mouth daily. 90 tablet 1    levothyroxine 50 MCG Oral Tab Take 1 tablet (50 mcg total) by mouth before breakfast. 90 tablet 3    losartan 100 MG Oral Tab Take 1 tablet (100 mg total) by mouth daily. 90 tablet 1    metFORMIN  MG Oral Tablet 24 Hr Take 1 tablet (500 mg total) by mouth daily. 90 tablet 1    atorvastatin 40 MG Oral Tab Take 1 tablet (40 mg total) by mouth nightly. 90 tablet 1    HYDROcodone-acetaminophen 5-325 MG Oral Tab Take 1 tablet by mouth every 8 (eight) hours as needed for Pain. 10 tablet 0    Glucose Blood (ONETOUCH ULTRA) In Vitro Strip Once daily 100 each 0    Blood Glucose Monitoring Suppl (ONETOUCH ULTRA 2) w/Device Does not apply Kit 1 Device by Other route 2 (two) times daily. Use as directed. 1 kit 0    apixaban 5 MG Oral Tab Take 1 tablet (5 mg total) by mouth 2 (two) times daily. 60 tablet 3    aspirin 81 MG Oral Tab EC Take 1 tablet (81 mg total) by mouth daily.      FLUoxetine HCl 20 MG Oral Cap Take 1 capsule (20 mg total) by mouth daily. 30 capsule 1    ClonazePAM 0.5 MG Oral Tab Take 1 tablet (0.5 mg total) by mouth nightly as needed for Anxiety. 30 tablet 1    Multiple Vitamin (TAB-A-ZULAY) Oral Tab Take 1 tablet by mouth daily.      atenolol 25 MG Oral Tab Take 1 tablet (25 mg total) by mouth daily. 90 tablet 1      Past Medical History:    Actinic keratosis    Acute, but ill-defined, cerebrovascular disease    ADD (attention deficit disorder)    Allergic rhinitis due to pollen    Allergic rhinitis, cause unspecified    Anxiety    Arrhythmia    Atherosclerosis of coronary artery    Atrial fibrillation (HCC)    CAD (coronary artery disease)    stent    Carotid stenosis    Coronary atherosclerosis    Coronary atherosclerosis of native coronary artery    Decorative tattoo    Diabetes (HCC)    Disorder of prostate     Disorder of thyroid    Easy bruising    Esophageal reflux    Essential hypertension    Essential hypertension, benign    Frequent urination    Gout    Heartburn    Hemorrhoids    High blood pressure    High cholesterol    Hyperlipidemia    Hyperthyroidism    Hypertrophy of prostate with urinary obstruction and other lower urinary tract symptoms (LUTS)    Impaired fasting glucose    Nocturia    Obesity    TAO (obstructive sleep apnea)    AHI 17.9 REM AHI 22.7 Supine AHI 71.5 non-supine AHI 13.7 Sao2 Brad 84.9%     Pure hypercholesterolemia    Reflux esophagitis    Sleep apnea    Stented coronary artery    Stress    Unspecified hypothyroidism    Wears glasses      Social History:  Social History     Socioeconomic History    Marital status:     Number of children: 3   Occupational History    Occupation:    Tobacco Use    Smoking status: Former     Current packs/day: 0.00     Average packs/day: 0.5 packs/day for 32.0 years (16.0 ttl pk-yrs)     Types: Cigarettes     Start date: 1976     Quit date: 2008     Years since quittin.7    Smokeless tobacco: Former    Tobacco comments:     socail smoker   Vaping Use    Vaping status: Never Used   Substance and Sexual Activity    Alcohol use: Yes     Alcohol/week: 7.0 standard drinks of alcohol     Types: 5 Cans of beer, 2 Shots of liquor per week     Comment: Have stopped due to triggering afib    Drug use: Yes     Frequency: 1.0 times per week     Types: Cannabis     Comment: occassionally edibles   Other Topics Concern     Service Yes    Blood Transfusions No    Caffeine Concern No    Occupational Exposure No    Hobby Hazards No    Sleep Concern Yes     Comment: using melatonin    Stress Concern No    Weight Concern Yes    Special Diet No    Back Care No    Exercise Yes    Bike Helmet Yes    Seat Belt Yes    Self-Exams No   Social History Narrative     with [employer]. , 3 children.      Social Drivers of Health      Financial Resource Strain: Low Risk  (4/16/2024)    Financial Resource Strain     Difficulty of Paying Living Expenses: Not hard at all   Food Insecurity: No Food Insecurity (4/14/2024)    Food Insecurity     Food Insecurity: Never true   Transportation Needs: No Transportation Needs (4/16/2024)    Transportation Needs     Lack of Transportation: No   Physical Activity: Sufficiently Active (3/6/2020)    [facility]    Exercise Vital Sign     Days of Exercise per Week: 3 days     Minutes of Exercise per Session: 50 min   Housing Stability: Low Risk  (4/14/2024)    Housing Stability     Housing Instability: No        REVIEW OF SYSTEMS:   A comprehensive 10 point review of systems was completed.  Pertinent positives and negatives noted in the the HPI.      EXAM:   There were no vitals taken for this visit.  GENERAL: well developed, well nourished,in no apparent distress  HEENT: atraumatic, normocephalic  NECK: supple  LUNGS: act of breathing is normal  CARDIO: speaking in full sentences without any distress  NEURO: AAO X 3    Please note that the following visit was completed using two-way, real-time interactive audio and video communication.  This has been done in good lia to provide continuity of care . There are limitations of this visit, as no physical exam could be performed.  Visit was planned and structured to allow sufficient time to address the issues presented.  Billing for this visit takes into account review of history, labs, medications, radiology tests , consultations and/or decision making.  Appropriate medical decision-making and tests are ordered as detailed in the assessment and plan of care.      NOTE TO PATIENT: The 21st Century Cures Act makes clinical notes like these available to patients in the interest of transparency. Clinical notes are medical documents used by physicians and care providers to communicate with each other. These documents  include medical language and terminology, abbreviations, and treatment information that may sound technical and at times possibly unfamiliar. In addition, at times, the verbiage may appear blunt or direct. These documents are one tool providers use to communicate relevant information and clinical opinions of the care providers in a way that allows common understanding of the clinical context.      Sarah Londono MD    11/08/24 9:04 AM

## 2024-12-20 ENCOUNTER — TELEPHONE (OUTPATIENT)
Dept: FAMILY MEDICINE CLINIC | Facility: CLINIC | Age: 64
End: 2024-12-20

## 2024-12-30 ENCOUNTER — MED REC SCAN ONLY (OUTPATIENT)
Dept: FAMILY MEDICINE CLINIC | Facility: CLINIC | Age: 64
End: 2024-12-30

## 2025-01-28 ENCOUNTER — LABORATORY ENCOUNTER (OUTPATIENT)
Dept: LAB | Age: 65
End: 2025-01-28
Attending: FAMILY MEDICINE
Payer: COMMERCIAL

## 2025-01-28 DIAGNOSIS — E78.2 MIXED HYPERLIPIDEMIA: ICD-10-CM

## 2025-01-28 DIAGNOSIS — E11.65 TYPE 2 DIABETES MELLITUS WITH HYPERGLYCEMIA, WITHOUT LONG-TERM CURRENT USE OF INSULIN (HCC): ICD-10-CM

## 2025-01-28 DIAGNOSIS — Z12.5 SCREENING FOR PROSTATE CANCER: ICD-10-CM

## 2025-01-28 LAB
ALBUMIN SERPL-MCNC: 4.5 G/DL (ref 3.2–4.8)
ALBUMIN/GLOB SERPL: 2.1 {RATIO} (ref 1–2)
ALP LIVER SERPL-CCNC: 59 U/L
ALT SERPL-CCNC: 32 U/L
ANION GAP SERPL CALC-SCNC: 9 MMOL/L (ref 0–18)
AST SERPL-CCNC: 22 U/L (ref ?–34)
BILIRUB SERPL-MCNC: 0.6 MG/DL (ref 0.2–1.1)
BUN BLD-MCNC: 20 MG/DL (ref 9–23)
CALCIUM BLD-MCNC: 9.7 MG/DL (ref 8.7–10.6)
CHLORIDE SERPL-SCNC: 104 MMOL/L (ref 98–112)
CHOLEST SERPL-MCNC: 128 MG/DL (ref ?–200)
CO2 SERPL-SCNC: 27 MMOL/L (ref 21–32)
COMPLEXED PSA SERPL-MCNC: 0.31 NG/ML (ref ?–4)
CREAT BLD-MCNC: 1.11 MG/DL
CREAT UR-SCNC: 100.6 MG/DL
EGFRCR SERPLBLD CKD-EPI 2021: 73 ML/MIN/1.73M2 (ref 60–?)
EST. AVERAGE GLUCOSE BLD GHB EST-MCNC: 151 MG/DL (ref 68–126)
FASTING PATIENT LIPID ANSWER: YES
FASTING STATUS PATIENT QL REPORTED: YES
GLOBULIN PLAS-MCNC: 2.1 G/DL (ref 2–3.5)
GLUCOSE BLD-MCNC: 140 MG/DL (ref 70–99)
HBA1C MFR BLD: 6.9 % (ref ?–5.7)
HDLC SERPL-MCNC: 30 MG/DL (ref 40–59)
LDLC SERPL CALC-MCNC: 60 MG/DL (ref ?–100)
MICROALBUMIN UR-MCNC: 0.9 MG/DL
MICROALBUMIN/CREAT 24H UR-RTO: 8.9 UG/MG (ref ?–30)
NONHDLC SERPL-MCNC: 98 MG/DL (ref ?–130)
OSMOLALITY SERPL CALC.SUM OF ELEC: 295 MOSM/KG (ref 275–295)
POTASSIUM SERPL-SCNC: 4.5 MMOL/L (ref 3.5–5.1)
PROT SERPL-MCNC: 6.6 G/DL (ref 5.7–8.2)
SODIUM SERPL-SCNC: 140 MMOL/L (ref 136–145)
TRIGL SERPL-MCNC: 231 MG/DL (ref 30–149)
VLDLC SERPL CALC-MCNC: 34 MG/DL (ref 0–30)

## 2025-01-28 PROCEDURE — 83036 HEMOGLOBIN GLYCOSYLATED A1C: CPT

## 2025-01-28 PROCEDURE — 82570 ASSAY OF URINE CREATININE: CPT

## 2025-01-28 PROCEDURE — 82043 UR ALBUMIN QUANTITATIVE: CPT

## 2025-01-28 PROCEDURE — 36415 COLL VENOUS BLD VENIPUNCTURE: CPT

## 2025-01-28 PROCEDURE — 80061 LIPID PANEL: CPT

## 2025-01-28 PROCEDURE — 80053 COMPREHEN METABOLIC PANEL: CPT

## 2025-01-31 ENCOUNTER — OFFICE VISIT (OUTPATIENT)
Dept: FAMILY MEDICINE CLINIC | Facility: CLINIC | Age: 65
End: 2025-01-31
Payer: COMMERCIAL

## 2025-01-31 VITALS
TEMPERATURE: 98 F | RESPIRATION RATE: 18 BRPM | OXYGEN SATURATION: 98 % | WEIGHT: 239 LBS | SYSTOLIC BLOOD PRESSURE: 130 MMHG | HEIGHT: 71 IN | BODY MASS INDEX: 33.46 KG/M2 | HEART RATE: 60 BPM | DIASTOLIC BLOOD PRESSURE: 84 MMHG

## 2025-01-31 DIAGNOSIS — L84 CALLUS OF FOOT: ICD-10-CM

## 2025-01-31 DIAGNOSIS — E78.2 MIXED HYPERLIPIDEMIA: ICD-10-CM

## 2025-01-31 DIAGNOSIS — E11.65 TYPE 2 DIABETES MELLITUS WITH HYPERGLYCEMIA, WITHOUT LONG-TERM CURRENT USE OF INSULIN (HCC): Primary | ICD-10-CM

## 2025-01-31 DIAGNOSIS — I48.0 PAROXYSMAL A-FIB (HCC): ICD-10-CM

## 2025-01-31 DIAGNOSIS — I25.10 ATHEROSCLEROSIS OF NATIVE CORONARY ARTERY OF NATIVE HEART WITHOUT ANGINA PECTORIS: ICD-10-CM

## 2025-01-31 DIAGNOSIS — E03.9 ACQUIRED HYPOTHYROIDISM: ICD-10-CM

## 2025-01-31 DIAGNOSIS — I10 ESSENTIAL HYPERTENSION: ICD-10-CM

## 2025-01-31 PROCEDURE — 99214 OFFICE O/P EST MOD 30 MIN: CPT | Performed by: FAMILY MEDICINE

## 2025-01-31 RX ORDER — LOSARTAN POTASSIUM 100 MG/1
100 TABLET ORAL DAILY
Qty: 90 TABLET | Refills: 1 | Status: SHIPPED | OUTPATIENT
Start: 2025-01-31

## 2025-01-31 RX ORDER — METFORMIN HYDROCHLORIDE 500 MG/1
500 TABLET, EXTENDED RELEASE ORAL DAILY
Qty: 90 TABLET | Refills: 1 | Status: SHIPPED | OUTPATIENT
Start: 2025-01-31

## 2025-01-31 RX ORDER — AMLODIPINE BESYLATE 5 MG/1
5 TABLET ORAL DAILY
Qty: 90 TABLET | Refills: 1 | Status: SHIPPED | OUTPATIENT
Start: 2025-01-31

## 2025-01-31 RX ORDER — ATORVASTATIN CALCIUM 40 MG/1
40 TABLET, FILM COATED ORAL NIGHTLY
Qty: 90 TABLET | Refills: 1 | Status: SHIPPED | OUTPATIENT
Start: 2025-01-31

## 2025-01-31 NOTE — PROGRESS NOTES
Family Medicine Progress Note  ASSESSMENT AND PLAN:  Bertrand Gonzalez is a 64 year old male who is here for:     Bertrand was seen today for medication follow-up.    Diagnoses and all orders for this visit:    Type 2 diabetes mellitus with hyperglycemia, without long-term current use of insulin (HCC)  -     metFORMIN  MG Oral Tablet 24 Hr; Take 1 tablet (500 mg total) by mouth daily.  -     Hemoglobin A1C; Future  -     Comp Metabolic Panel (14); Future  -     Microalb/Creat Ratio, Random Urine; Future         -     controlled HgbA1c 6.9         -     continue the same dose of medication         -     continue to limit refined sugars and carbs in diet    Essential hypertension controlled  -     losartan 100 MG Oral Tab; Take 1 tablet (100 mg total) by mouth daily.  -     amLODIPine 5 MG Oral Tab; Take 1 tablet (5 mg total) by mouth daily.  -     Comp Metabolic Panel (14); Future         -  continue the same dose of medication         -  DASH diet, limit salt to less than 2000 mg         -  Goal BP less than 130/80    Mixed hyperlipidemia controlled  -     atorvastatin 40 MG Oral Tab; Take 1 tablet (40 mg total) by mouth nightly.  -     Lipid Panel; Future         -     advised otc omega 3 fatty acids         -     continue the same dose of medication    Atherosclerosis of native coronary artery of native heart without angina pectoris         - no angina         -  f/u annually with cardiologist for management.    Paroxysmal A-fib (HCC)        - currently in sinus rhythm        - continue Eliquis as prescribed.        - f/u with cardiologist    Acquired hypothyroidism controlled  -     TSH and Free T4; Future  -     continue the same dose of medication  -     repeat labs before next visit.    Callus of foot  -     Podiatry Referral - In Network      The patient indicates understanding of these issues and agrees to the plan.  Follow-Up: The patient is asked to Return in about 6 months (around 7/31/2025) for HTN  f/u, Diabetes f/u, hyperlipidemia.    .     Sarah Londono MD   01/31/25      CC: Medication Follow-Up    HPI:   Bertrand Gonzalez is a 64 year old male who presents for Medication Follow-Up     Patient states has been compliant with low-carb diet and medication, tolerating medications well without any side effects.  Patient states over 6 weeks ago checked her sugars and they were in the 200s, states cut out carbs and processed food and has been eating very clean and now his fasting sugars are down to 150s.  Last Diabetic Eye Exam:  Last Dilated Eye Exam: 12/16/24  Eye Exam shows Diabetic Retinopathy?: No    Compliant with his cholesterol medication and diet, states has been taking flaxseed and eating more fruits and berries.    Compliant with blood pressure medication and low-salt diet, tolerating medications well without any side effects.  Denies headache, dizziness, vision changes, chest pain, palpitations, AGUIAR or swelling in his legs.    Patient states has increased his water intake and has not had a gout flareup also feels like his A-fib is more controlled.  Follows up with Dr. Rendon and Dr. Barriga once a year.  Continuing to take Eliquis as prescribed.    Compliant with his thyroid medication.     ALLERGY:     Allergies as of 01/31/2025 - Review Complete 01/31/2025   Allergen Reaction Noted    Penicillin v potassium  03/09/2013     MEDICATIONS:     Current Outpatient Medications   Medication Sig Dispense Refill    amLODIPine 5 MG Oral Tab Take 1 tablet (5 mg total) by mouth daily. 90 tablet 1    levothyroxine 50 MCG Oral Tab Take 1 tablet (50 mcg total) by mouth before breakfast. 90 tablet 3    losartan 100 MG Oral Tab Take 1 tablet (100 mg total) by mouth daily. 90 tablet 1    metFORMIN  MG Oral Tablet 24 Hr Take 1 tablet (500 mg total) by mouth daily. 90 tablet 1    atorvastatin 40 MG Oral Tab Take 1 tablet (40 mg total) by mouth nightly. 90 tablet 1    HYDROcodone-acetaminophen 5-325 MG Oral Tab  Take 1 tablet by mouth every 8 (eight) hours as needed for Pain. 10 tablet 0    Glucose Blood (ONETOUCH ULTRA) In Vitro Strip Once daily 100 each 0    Blood Glucose Monitoring Suppl (ONETOUCH ULTRA 2) w/Device Does not apply Kit 1 Device by Other route 2 (two) times daily. Use as directed. 1 kit 0    apixaban 5 MG Oral Tab Take 1 tablet (5 mg total) by mouth 2 (two) times daily. 60 tablet 3    aspirin 81 MG Oral Tab EC Take 1 tablet (81 mg total) by mouth daily.      FLUoxetine HCl 20 MG Oral Cap Take 1 capsule (20 mg total) by mouth daily. 30 capsule 1    ClonazePAM 0.5 MG Oral Tab Take 1 tablet (0.5 mg total) by mouth nightly as needed for Anxiety. 30 tablet 1    Multiple Vitamin (TAB-A-ZULAY) Oral Tab Take 1 tablet by mouth daily.        Past Medical History:    Actinic keratosis    Acute, but ill-defined, cerebrovascular disease    ADD (attention deficit disorder)    Allergic rhinitis due to pollen    Allergic rhinitis, cause unspecified    Anxiety    Arrhythmia    Atherosclerosis of coronary artery    Atrial fibrillation (HCC)    CAD (coronary artery disease)    stent    Carotid stenosis    Coronary atherosclerosis    Coronary atherosclerosis of native coronary artery    Decorative tattoo    Diabetes (HCC)    Disorder of prostate    Disorder of thyroid    Easy bruising    Esophageal reflux    Essential hypertension    Essential hypertension, benign    Frequent urination    Gout    Heartburn    Hemorrhoids    High blood pressure    High cholesterol    Hyperlipidemia    Hyperthyroidism    Hypertrophy of prostate with urinary obstruction and other lower urinary tract symptoms (LUTS)    Impaired fasting glucose    Nocturia    Obesity    TAO (obstructive sleep apnea)    AHI 17.9 REM AHI 22.7 Supine AHI 71.5 non-supine AHI 13.7 Sao2 Brad 84.9%     Pure hypercholesterolemia    Reflux esophagitis    Sleep apnea    Stented coronary artery    Stress    Unspecified hypothyroidism    Wears glasses      Social  History:  Social History     Socioeconomic History    Marital status:     Number of children: 3   Occupational History    Occupation:    Tobacco Use    Smoking status: Former     Current packs/day: 0.00     Average packs/day: 0.5 packs/day for 32.0 years (16.0 ttl pk-yrs)     Types: Cigarettes     Start date: 1976     Quit date: 2008     Years since quittin.0    Smokeless tobacco: Former    Tobacco comments:     socail smoker   Vaping Use    Vaping status: Never Used   Substance and Sexual Activity    Alcohol use: Yes     Alcohol/week: 7.0 standard drinks of alcohol     Types: 5 Cans of beer, 2 Shots of liquor per week     Comment: Have stopped due to triggering afib    Drug use: Yes     Frequency: 1.0 times per week     Types: Cannabis     Comment: occassionally edibles   Other Topics Concern     Service Yes    Blood Transfusions No    Caffeine Concern No    Occupational Exposure No    Hobby Hazards No    Sleep Concern Yes     Comment: using melatonin    Stress Concern No    Weight Concern Yes    Special Diet No    Back Care No    Exercise Yes    Bike Helmet Yes    Seat Belt Yes    Self-Exams No   Social History Narrative     with [employer]. , 3 children.      Social Drivers of Health     Financial Resource Strain: Low Risk  (2024)    Financial Resource Strain     Difficulty of Paying Living Expenses: Not hard at all   Food Insecurity: No Food Insecurity (2025)    NCSS - Food Insecurity     Worried About Running Out of Food in the Last Year: No     Ran Out of Food in the Last Year: No   Transportation Needs: No Transportation Needs (2025)    NCSS - Transportation     Lack of Transportation: No   Physical Activity: Sufficiently Active (3/6/2020)    Received from [facility], [facility]    Exercise Vital Sign     Days of Exercise per Week: 3 days     Minutes of Exercise per Session: 50 min   Housing Stability: Not At Risk  (1/31/2025)    NCSS - Housing/Utilities     Has Housing: Yes     Worried About Losing Housing: No     Unable to Get Utilities: No        REVIEW OF SYSTEMS:   GENERAL HEALTH: feels well otherwise  SKIN: denies any unusual skin lesions or rashes  RESPIRATORY: denies shortness of breath with exertion  CARDIOVASCULAR: denies chest pain on exertion  GI: denies abdominal pain and denies heartburn  NEURO: denies headaches    EXAM:   Blood pressure 130/84, pulse 60, temperature 97.8 °F (36.6 °C), temperature source Temporal, resp. rate 18, height 5' 11\" (1.803 m), weight 239 lb (108.4 kg), SpO2 98%.    GENERAL: obese, in no apparent distress  SKIN: no rashes,no suspicious lesions  HEENT: atraumatic, normocephalic,ears and throat are clear  NECK: supple,no adenopathy,no bruits  LUNGS: clear to auscultation  CARDIO: RRR without murmur  GI: good BS's,no masses, HSM or tenderness  EXTREMITIES: no cyanosis, clubbing or edema  Bilateral barefoot skin diabetic exam is normal, + high arch, callus bilateral feet  Bilateral monofilament/sensation of both feet is normal.  Pulsation pedal pulse exam of both lower legs/feet is normal as well.      Component      Latest Ref Rng 1/28/2025   Glucose      70 - 99 mg/dL 140 (H)    Sodium      136 - 145 mmol/L 140    Potassium      3.5 - 5.1 mmol/L 4.5    Chloride      98 - 112 mmol/L 104    Carbon Dioxide, Total      21.0 - 32.0 mmol/L 27.0    ANION GAP      0 - 18 mmol/L 9    BUN      9 - 23 mg/dL 20    CREATININE      0.70 - 1.30 mg/dL 1.11    CALCIUM      8.7 - 10.6 mg/dL 9.7    CALCULATED OSMOLALITY      275 - 295 mOsm/kg 295    EGFR      >=60 mL/min/1.73m2 73    AST (SGOT)      <34 U/L 22    ALT (SGPT)      10 - 49 U/L 32    ALKALINE PHOSPHATASE      45 - 117 U/L 59    Total Bilirubin      0.2 - 1.1 mg/dL 0.6    PROTEIN, TOTAL      5.7 - 8.2 g/dL 6.6    Albumin      3.2 - 4.8 g/dL 4.5    Globulin      2.0 - 3.5 g/dL 2.1    A/G Ratio      1.0 - 2.0  2.1 (H)    Patient Fasting for CMP?  Yes    Cholesterol, Total      <200 mg/dL 128    HDL Cholesterol      40 - 59 mg/dL 30 (L)    Triglycerides      30 - 149 mg/dL 231 (H)    LDL Cholesterol Calc      <100 mg/dL 60    VLDL      0 - 30 mg/dL 34 (H)    NON-HDL CHOLESTEROL      <130 mg/dL 98    Patient Fasting for Lipid? Yes    MALB URINE      mg/dL 0.90    CREATININE UR RANDOM      mg/dL 100.60    MALB/CRE CALC      <=30.0 ug/mg 8.9    HEMOGLOBIN A1c      <5.7 % 6.9 (H)    ESTIMATED AVERAGE GLUCOSE      68 - 126 mg/dL 151 (H)    PSA Screen      <=4.00 ng/mL 0.31       Legend:  (H) High  (L) Low  NOTE TO PATIENT: The 21st Century Cures Act makes clinical notes like these available to patients in the interest of transparency. Clinical notes are medical documents used by physicians and care providers to communicate with each other. These documents include medical language and terminology, abbreviations, and treatment information that may sound technical and at times possibly unfamiliar. In addition, at times, the verbiage may appear blunt or direct. These documents are one tool providers use to communicate relevant information and clinical opinions of the care providers in a way that allows common understanding of the clinical context.      Sarah Londono MD

## 2025-04-14 DIAGNOSIS — E03.9 HYPOTHYROIDISM, UNSPECIFIED TYPE: ICD-10-CM

## 2025-04-17 RX ORDER — LEVOTHYROXINE SODIUM 25 UG/1
25 TABLET ORAL
Qty: 90 TABLET | Refills: 0 | OUTPATIENT
Start: 2025-04-17

## 2025-04-17 NOTE — TELEPHONE ENCOUNTER
Patient called aide Jose told him he needs a follow-up appointment with Doctor for a refill. .  Patient was last seen 1-31-25.  Was told to follow-up - 6 months.  Patient is scheduled for 8-22-25.        Last filled:       levothyroxine 50 MCG Oral Tab - 8-2-24 with 3 refills 90 tablet     3 8/2/2024   Pharmacy told him he has no refills left.

## 2025-04-17 NOTE — TELEPHONE ENCOUNTER
Levothyroxine 25mcg: patients current dose is Levothyroxine 50mcg    Levothyroxine 50mc year supply sent to [facility] in [place] on 2024

## 2025-05-16 ENCOUNTER — HOSPITAL ENCOUNTER (OUTPATIENT)
Dept: GENERAL RADIOLOGY | Age: 65
Discharge: HOME | End: 2025-05-16
Attending: FAMILY MEDICINE
Payer: COMMERCIAL

## 2025-05-16 ENCOUNTER — OFFICE VISIT (OUTPATIENT)
Dept: FAMILY MEDICINE CLINIC | Facility: CLINIC | Age: 65
End: 2025-05-16
Payer: COMMERCIAL

## 2025-05-16 VITALS
OXYGEN SATURATION: 98 % | WEIGHT: 235 LBS | RESPIRATION RATE: 16 BRPM | HEART RATE: 50 BPM | BODY MASS INDEX: 32.9 KG/M2 | TEMPERATURE: 97 F | SYSTOLIC BLOOD PRESSURE: 138 MMHG | DIASTOLIC BLOOD PRESSURE: 80 MMHG | HEIGHT: 71 IN

## 2025-05-16 DIAGNOSIS — R20.2 PARESTHESIA OF LEFT ARM: Primary | ICD-10-CM

## 2025-05-16 DIAGNOSIS — M54.6 ACUTE LEFT-SIDED THORACIC BACK PAIN: ICD-10-CM

## 2025-05-16 DIAGNOSIS — R20.2 PARESTHESIA OF LEFT ARM: ICD-10-CM

## 2025-05-16 PROCEDURE — 72050 X-RAY EXAM NECK SPINE 4/5VWS: CPT | Performed by: FAMILY MEDICINE

## 2025-05-16 PROCEDURE — 72072 X-RAY EXAM THORAC SPINE 3VWS: CPT | Performed by: FAMILY MEDICINE

## 2025-05-16 PROCEDURE — 99213 OFFICE O/P EST LOW 20 MIN: CPT | Performed by: FAMILY MEDICINE

## 2025-05-16 RX ORDER — ATENOLOL 25 MG/1
12.5 TABLET ORAL DAILY
COMMUNITY
Start: 2025-04-30

## 2025-05-16 RX ORDER — CYCLOBENZAPRINE HCL 10 MG
10 TABLET ORAL NIGHTLY
Qty: 10 TABLET | Refills: 0 | Status: SHIPPED | OUTPATIENT
Start: 2025-05-16 | End: 2025-05-26

## 2025-05-16 RX ORDER — OMEGA-3-ACID ETHYL ESTERS 1 G/1
CAPSULE, LIQUID FILLED ORAL
COMMUNITY
Start: 2021-12-03

## 2025-05-16 RX ORDER — METHYLPREDNISOLONE 4 MG/1
TABLET ORAL
Qty: 21 TABLET | Refills: 0 | Status: SHIPPED | OUTPATIENT
Start: 2025-05-16

## 2025-05-16 NOTE — PROGRESS NOTES
Family Medicine Progress Note  ASSESSMENT AND PLAN:  Bertrand Gonzalez is a 64 year old male who is here for:     Bertrand was seen today for back pain and other.    Diagnoses and all orders for this visit:    Paresthesia of left arm  Progressive left upper back pain radiating to left arm with tingling and cramping in fingers. Weakness in left hand suggests cervical nerve root compression. Differential includes referred pain from neck or thoracic spine.  - Order x-ray of neck and thoracic spine.  - Prescribe nighttime muscle relaxant.  - Prescribe steroid pack.  - Schedule follow-up in one week to assess treatment response and review x-ray.  - Consider spine specialist referral if x-ray shows significant abnormalities.  -     XR CERVICAL SPINE (4VIEWS) (CPT=72050); Future  -     methylPREDNISolone (MEDROL) 4 MG Oral Tablet Therapy Pack; As directed.    Acute left-sided thoracic back pain  -     XR THORACIC SPINE (3 VIEWS) (CPT=72072); Future  -     cyclobenzaprine 10 MG Oral Tab; Take 1 tablet (10 mg total) by mouth nightly for 10 days.       The patient indicates understanding of these issues and agrees to the plan.  Follow-Up: The patient is asked to return in Return in about 1 week (around 5/23/2025), or if symptoms worsen or fail to improve.  .     Sarah Londono MD      CC: Back Pain     The following individual(s) verbally consented to be recorded using ambient AI listening technology and understand that they can each withdraw their consent to this listening technology at any point by asking the clinician to turn off or pause the recording:    Patient name: Bertrand Gonzalez      HPI:     History of Present Illness  Bertrand Gonzalez is a 64 year old male who presents with left upper back pain and associated symptoms.    He has been experiencing a stabbing pain in the left upper side of his back for approximately three weeks, which began after a round of golf. Despite regular visits to his chiropractor, the pain  has not improved and has progressively worsened. The pain is more pronounced in the afternoon and evening, particularly when driving or typing.    The pain is accompanied by cramping and curling of his fingers, particularly on the left side, and an 'electric tingle' sensation. Symptoms are somewhat alleviated by raising his arm over his head. He experiences relief upon waking in the morning, but symptoms recur as the day progresses.    He is an active individual, engaging in activities such as golf, rowing, and biking. Due to the pain, he has reduced his biking from 80 miles a week to only three or four miles. The pain becomes severe enough to prevent him from continuing his activities, requiring him to rest.    No neck pain is reported. He experiences a tingling sensation in his fingers when pressure is applied to the affected area of his back. He also reports a sensation of weakness in his left hand, particularly in the ring and little fingers, with an intermittent electric current sensation propagating through his lower arm.    He has been using a small exercise ball, but this has not alleviated his symptoms and sometimes exacerbates the curling of his fingers. Has been working with his chiropractor 2-3 times a week with no relief.    ALLERGY:     Allergies as of 05/16/2025 - Review Complete 05/16/2025   Allergen Reaction Noted    Penicillin v potassium  03/09/2013       MEDICATIONS:     Current Medications[1]   Past Medical History[2]   Social History:  Short Social Hx on File[3]     REVIEW OF SYSTEMS:   A comprehensive 10 point review of systems was completed.  Pertinent positives and negatives noted in the the HPI.      EXAM:   /80   Pulse 50   Temp 96.8 °F (36 °C) (Temporal)   Resp 16   Ht 5' 11\" (1.803 m)   Wt 235 lb (106.6 kg)   SpO2 98%   BMI 32.78 kg/m²   GENERAL: obese,in no apparent distress  SKIN: no rashes,no suspicious lesions  NECK: supple, no tenderness to palpation over the cervical  spine, normal ROM  LUNGS: clear to auscultation  CARDIO: RRR without murmur  BACK: no tenderness to palpation over the thoracic spine, mild spasm of trapezius  NEURO: left UE, normal sensation, DTR 2+, strength 4/5.      NOTE TO PATIENT: The 21st Century Cures Act makes clinical notes like these available to patients in the interest of transparency. Clinical notes are medical documents used by physicians and care providers to communicate with each other. These documents include medical language and terminology, abbreviations, and treatment information that may sound technical and at times possibly unfamiliar. In addition, at times, the verbiage may appear blunt or direct. These documents are one tool providers use to communicate relevant information and clinical opinions of the care providers in a way that allows common understanding of the clinical context.      Sarah Londono MD             [1]   Current Outpatient Medications   Medication Sig Dispense Refill    atenolol 25 MG Oral Tab Take 0.5 tablets (12.5 mg total) by mouth daily.      omega-3-acid ethyl esters 1 g Oral Cap omega-3 acid ethyl esters (LOVAZA) 1 g capsule, [RxNorm: 362841]      metFORMIN  MG Oral Tablet 24 Hr Take 1 tablet (500 mg total) by mouth daily. 90 tablet 1    losartan 100 MG Oral Tab Take 1 tablet (100 mg total) by mouth daily. 90 tablet 1    atorvastatin 40 MG Oral Tab Take 1 tablet (40 mg total) by mouth nightly. 90 tablet 1    amLODIPine 5 MG Oral Tab Take 1 tablet (5 mg total) by mouth daily. 90 tablet 1    levothyroxine 50 MCG Oral Tab Take 1 tablet (50 mcg total) by mouth before breakfast. 90 tablet 3    Glucose Blood (ONETOUCH ULTRA) In Vitro Strip Once daily 100 each 0    Blood Glucose Monitoring Suppl (ONETOUCH ULTRA 2) w/Device Does not apply Kit 1 Device by Other route 2 (two) times daily. Use as directed. 1 kit 0    apixaban 5 MG Oral Tab Take 1 tablet (5 mg total) by mouth 2 (two) times daily. 60 tablet 3    aspirin  81 MG Oral Tab EC Take 1 tablet (81 mg total) by mouth daily.      FLUoxetine HCl 20 MG Oral Cap Take 1 capsule (20 mg total) by mouth daily. (Patient not taking: Reported on 5/16/2025) 30 capsule 1    ClonazePAM 0.5 MG Oral Tab Take 1 tablet (0.5 mg total) by mouth nightly as needed for Anxiety. 30 tablet 1    Multiple Vitamin (TAB-A-ZULAY) Oral Tab Take 1 tablet by mouth daily.     [2]   Past Medical History:   Actinic keratosis    Acute, but ill-defined, cerebrovascular disease    ADD (attention deficit disorder)    Allergic rhinitis due to pollen    Allergic rhinitis, cause unspecified    Anxiety    Arrhythmia    Atherosclerosis of coronary artery    Atrial fibrillation (HCC)    CAD (coronary artery disease)    stent    Carotid stenosis    Coronary atherosclerosis    Coronary atherosclerosis of native coronary artery    Decorative tattoo    Diabetes (HCC)    Disorder of prostate    Disorder of thyroid    Easy bruising    Esophageal reflux    Essential hypertension    Essential hypertension, benign    Frequent urination    Gout    Heartburn    Hemorrhoids    High blood pressure    High cholesterol    History of cardiac murmur    History of depression    Hyperlipidemia    Hyperthyroidism    Hypertrophy of prostate with urinary obstruction and other lower urinary tract symptoms (LUTS)    Impaired fasting glucose    Night sweats    Nocturia    Obesity    TAO (obstructive sleep apnea)    AHI 17.9 REM AHI 22.7 Supine AHI 71.5 non-supine AHI 13.7 Sao2 Brad 84.9%     Pure hypercholesterolemia    Reflux esophagitis    Sleep apnea    Sleep disturbance    Stented coronary artery    Stress    Unspecified hypothyroidism    Wears glasses   [3]   Social History  Socioeconomic History    Marital status:     Number of children: 3   Occupational History    Occupation:    Tobacco Use    Smoking status: Former     Current packs/day: 0.00     Average packs/day: 0.5 packs/day for 32.0 years (16.0 ttl pk-yrs)      Types: Cigarettes     Start date: 1976     Quit date: 2008     Years since quittin.3    Smokeless tobacco: Never    Tobacco comments:     socail smoker   Vaping Use    Vaping status: Never Used   Substance and Sexual Activity    Alcohol use: Yes     Alcohol/week: 3.0 standard drinks of alcohol     Types: 3 Cans of beer per week     Comment: Have stopped due to triggering afib    Drug use: Yes     Frequency: 1.0 times per week     Types: Cannabis     Comment: occassionally edibles   Other Topics Concern     Service Yes    Blood Transfusions No    Caffeine Concern No    Occupational Exposure No    Hobby Hazards No    Sleep Concern Yes     Comment: using melatonin    Stress Concern No    Weight Concern No    Special Diet No    Back Care No    Exercise No    Bike Helmet Yes    Seat Belt No    Self-Exams No   Social History Narrative     with [email or web address]. , 3 children.      Social Drivers of Health     Food Insecurity: No Food Insecurity (2025)    NCSS - Food Insecurity     Worried About Running Out of Food in the Last Year: No     Ran Out of Food in the Last Year: No   Transportation Needs: No Transportation Needs (2025)    NCSS - Transportation     Lack of Transportation: No   Housing Stability: Not At Risk (2025)    NCSS - Housing/Utilities     Has Housing: Yes     Worried About Losing Housing: No     Unable to Get Utilities: No

## 2025-05-16 NOTE — PATIENT INSTRUCTIONS
VISIT SUMMARY:    Today, you were seen for left upper back pain that has been ongoing for three weeks. The pain started after playing golf and has been getting worse despite seeing a chiropractor. You also have cramping and tingling in your fingers, especially on the left side, and some weakness in your left hand.    YOUR PLAN:    -NERVE ROOT COMPRESSION IN CERVICAL REGION: Nerve root compression in the cervical region means that a nerve in your neck is being pinched or pressed, which can cause pain, tingling, and weakness in your back, arm, and fingers. We will order an x-ray of your neck and upper back to get a better look at what might be causing this. You will also be prescribed a muscle relaxant to take at night and a steroid pack to help reduce inflammation. We will follow up in one week to see how you are responding to the treatment and to review the x-ray results. If the x-ray shows significant issues, we may refer you to a spine specialist.    INSTRUCTIONS:    Please get the x-ray of your neck and thoracic spine done as soon as possible. Take the prescribed muscle relaxant at night and follow the instructions for the steroid pack. We will see you in one week to assess your response to the treatment and review the x-ray results. If needed, we may refer you to a spine specialist based on the x-ray findings.

## 2025-05-23 ENCOUNTER — OFFICE VISIT (OUTPATIENT)
Dept: FAMILY MEDICINE CLINIC | Facility: CLINIC | Age: 65
End: 2025-05-23
Payer: COMMERCIAL

## 2025-05-23 VITALS
RESPIRATION RATE: 18 BRPM | TEMPERATURE: 98 F | BODY MASS INDEX: 32.9 KG/M2 | HEART RATE: 68 BPM | HEIGHT: 71 IN | DIASTOLIC BLOOD PRESSURE: 62 MMHG | SYSTOLIC BLOOD PRESSURE: 122 MMHG | OXYGEN SATURATION: 98 % | WEIGHT: 235 LBS

## 2025-05-23 DIAGNOSIS — M54.6 ACUTE LEFT-SIDED THORACIC BACK PAIN: Primary | ICD-10-CM

## 2025-05-23 DIAGNOSIS — M50.30 DDD (DEGENERATIVE DISC DISEASE), CERVICAL: ICD-10-CM

## 2025-05-23 DIAGNOSIS — R00.1 BRADYCARDIA: ICD-10-CM

## 2025-05-23 DIAGNOSIS — M51.34 DDD (DEGENERATIVE DISC DISEASE), THORACIC: ICD-10-CM

## 2025-05-23 DIAGNOSIS — I48.0 PAF (PAROXYSMAL ATRIAL FIBRILLATION) (HCC): ICD-10-CM

## 2025-05-23 PROCEDURE — 99213 OFFICE O/P EST LOW 20 MIN: CPT | Performed by: FAMILY MEDICINE

## 2025-05-23 NOTE — PROGRESS NOTES
Family Medicine Progress Note  ASSESSMENT AND PLAN:  Bertrand Gonzalez is a 64 year old male who is here for:     Bertrand was seen today for other and back pain.    Diagnoses and all orders for this visit:    Acute left-sided thoracic back pain  -     Physical Therapy Referral - [facility]    DDD (degenerative disc disease), cervical  -     Physical Therapy Referral - [facility]    DDD (degenerative disc disease), thoracic  Moderate thoracic and mild to moderate cervical spine degeneration causing pain and radicular symptoms. Inflammation indicated by relief with Medrol Dosepak.  - Initiate physical therapy.  - Avoid activities engaging affected area until physical therapy begins.  - Consider referral to spine or pain specialist if symptoms persist after 8-12 physical therapy sessions.  - Discuss cortisone injection if physical therapy does not alleviate symptoms.  -     Physical Therapy Referral - [facility]    Bradycardia  Symptomatic bradycardia with heart rate in the 30s causing fatigue and dizziness. Cardiologist recommends pacemaker.  - f/u with cardiology for further management    PAF (paroxysmal atrial fibrillation) (HCC)  No Afib since 2024, on Eliquis 5 mg BID  - continue the same dose of medication  - f/u with cardiologist for management.       The patient indicates understanding of these issues and agrees to the plan.  Follow-Up: The patient is asked to return in Return if symptoms worsen or fail to improve.  .     Sarah Londono MD        CC: Back Pain     The following individual(s) verbally consented to be recorded using ambient AI listening technology and understand that they can each withdraw their consent to this listening technology at any point by asking the clinician to turn off or pause the recording:    Patient name: Bertrand Gonzalez    HPI:     History of Present Illness  Bertrand Gonzalez is a 64 year old male with back pain presents for follow up after he was treated with  muscle relaxer and steroid.    He experienced significant relief from back pain within two days of starting a prescribed medication regimen, which included a steroid. The pain was completely gone initially, but after completing the steroid course a day ago, he feels a slight return of discomfort, though not as severe as before. The pain is localized to the left mid back and sometimes radiates to the left hand, affecting the little and ring fingers.    He leads an active lifestyle, playing a lot of golf and engaging in physical activities, which he believes might contribute to his condition.     He has a long-standing history of a low heart rate, which has recently worsened, dropping into the thirties. He experiences frequent fatigue and occasional dizziness, particularly when standing up, which he suspects might be related to his blood pressure medication. He describes episodes where his heart rate fluctuates, sometimes going into a 'flutter'. He has a history of atrial fibrillation, which he has managed by increasing his water intake, reducing the frequency of AFib episodes. He is currently under the care of a cardiologist, was advised to get a pacemaker and is undergoing further cardiac evaluation, including an echocardiogram and other tests.    ALLERGY:     Allergies as of 05/23/2025 - Review Complete 05/23/2025   Allergen Reaction Noted    Penicillin v potassium  03/09/2013     MEDICATIONS:     Current Medications[1]   Past Medical History[2]   Social History:  Short Social Hx on File[3]     REVIEW OF SYSTEMS:   A comprehensive 10 point review of systems was completed.  Pertinent positives and negatives noted in the the HPI.      EXAM:   /62   Pulse 68   Temp 97.9 °F (36.6 °C) (Temporal)   Resp 18   Ht 5' 11\" (1.803 m)   Wt 235 lb (106.6 kg)   SpO2 98%   BMI 32.78 kg/m²   GENERAL: obese,in no apparent distress  SKIN: no rashes,no suspicious lesions  NECK: supple,no tenderness over the cervical  spine, normal ROM  LUNGS: clear to auscultation  CARDIO: RRR without murmur  BACK: no tenderness over the thoracic spine      NOTE TO PATIENT: The 21st Century Cures Act makes clinical notes like these available to patients in the interest of transparency. Clinical notes are medical documents used by physicians and care providers to communicate with each other. These documents include medical language and terminology, abbreviations, and treatment information that may sound technical and at times possibly unfamiliar. In addition, at times, the verbiage may appear blunt or direct. These documents are one tool providers use to communicate relevant information and clinical opinions of the care providers in a way that allows common understanding of the clinical context.      Sarah Londono MD    05/23/25 8:08 AM           [1]   Current Outpatient Medications   Medication Sig Dispense Refill    atenolol 25 MG Oral Tab Take 0.5 tablets (12.5 mg total) by mouth daily.      omega-3-acid ethyl esters 1 g Oral Cap omega-3 acid ethyl esters (LOVAZA) 1 g capsule, [RxNorm: 415776]      methylPREDNISolone (MEDROL) 4 MG Oral Tablet Therapy Pack As directed. 21 tablet 0    cyclobenzaprine 10 MG Oral Tab Take 1 tablet (10 mg total) by mouth nightly for 10 days. 10 tablet 0    metFORMIN  MG Oral Tablet 24 Hr Take 1 tablet (500 mg total) by mouth daily. 90 tablet 1    losartan 100 MG Oral Tab Take 1 tablet (100 mg total) by mouth daily. 90 tablet 1    atorvastatin 40 MG Oral Tab Take 1 tablet (40 mg total) by mouth nightly. 90 tablet 1    amLODIPine 5 MG Oral Tab Take 1 tablet (5 mg total) by mouth daily. 90 tablet 1    levothyroxine 50 MCG Oral Tab Take 1 tablet (50 mcg total) by mouth before breakfast. 90 tablet 3    Glucose Blood (ONETOUCH ULTRA) In Vitro Strip Once daily 100 each 0    Blood Glucose Monitoring Suppl (ONETOUCH ULTRA 2) w/Device Does not apply Kit 1 Device by Other route 2 (two) times daily. Use as directed.  1 kit 0    apixaban 5 MG Oral Tab Take 1 tablet (5 mg total) by mouth 2 (two) times daily. 60 tablet 3    aspirin 81 MG Oral Tab EC Take 1 tablet (81 mg total) by mouth daily.      ClonazePAM 0.5 MG Oral Tab Take 1 tablet (0.5 mg total) by mouth nightly as needed for Anxiety. 30 tablet 1    Multiple Vitamin (TAB-A-ZULAY) Oral Tab Take 1 tablet by mouth daily.      FLUoxetine HCl 20 MG Oral Cap Take 1 capsule (20 mg total) by mouth daily. (Patient not taking: Reported on 5/16/2025) 30 capsule 1   [2]   Past Medical History:   Actinic keratosis    Acute, but ill-defined, cerebrovascular disease    ADD (attention deficit disorder)    Allergic rhinitis due to pollen    Allergic rhinitis, cause unspecified    Anxiety    Arrhythmia    Atherosclerosis of coronary artery    Atrial fibrillation (HCC)    CAD (coronary artery disease)    stent    Carotid stenosis    Coronary atherosclerosis    Coronary atherosclerosis of native coronary artery    Decorative tattoo    Diabetes (HCC)    Disorder of prostate    Disorder of thyroid    Easy bruising    Esophageal reflux    Essential hypertension    Essential hypertension, benign    Frequent urination    Gout    Heartburn    Hemorrhoids    High blood pressure    High cholesterol    History of cardiac murmur    History of depression    Hyperlipidemia    Hyperthyroidism    Hypertrophy of prostate with urinary obstruction and other lower urinary tract symptoms (LUTS)    Impaired fasting glucose    Night sweats    Nocturia    Obesity    TAO (obstructive sleep apnea)    AHI 17.9 REM AHI 22.7 Supine AHI 71.5 non-supine AHI 13.7 Sao2 Brad 84.9%     Pure hypercholesterolemia    Reflux esophagitis    Sleep apnea    Sleep disturbance    Stented coronary artery    Stress    Unspecified hypothyroidism    Wears glasses   [3]   Social History  Socioeconomic History    Marital status:     Number of children: 3   Occupational History    Occupation:    Tobacco Use    Smoking status:  Former     Current packs/day: 0.00     Average packs/day: 0.5 packs/day for 32.0 years (16.0 ttl pk-yrs)     Types: Cigarettes     Start date: 1976     Quit date: 2008     Years since quittin.3    Smokeless tobacco: Never    Tobacco comments:     socail smoker   Vaping Use    Vaping status: Never Used   Substance and Sexual Activity    Alcohol use: Yes     Alcohol/week: 3.0 standard drinks of alcohol     Types: 3 Cans of beer per week     Comment: Have stopped due to triggering afib    Drug use: Yes     Frequency: 1.0 times per week     Types: Cannabis     Comment: occassionally edibles   Other Topics Concern     Service Yes    Blood Transfusions No    Caffeine Concern No    Occupational Exposure No    Hobby Hazards No    Sleep Concern Yes     Comment: using melatonin    Stress Concern No    Weight Concern No    Special Diet No    Back Care No    Exercise No    Bike Helmet Yes    Seat Belt No    Self-Exams No   Social History Narrative     with [employer]. , 3 children.      Social Drivers of Health     Food Insecurity: No Food Insecurity (2025)    NCSS - Food Insecurity     Worried About Running Out of Food in the Last Year: No     Ran Out of Food in the Last Year: No   Transportation Needs: No Transportation Needs (2025)    NCSS - Transportation     Lack of Transportation: No   Housing Stability: Not At Risk (2025)    NCSS - Housing/Utilities     Has Housing: Yes     Worried About Losing Housing: No     Unable to Get Utilities: No

## 2025-06-10 ENCOUNTER — OFFICE VISIT (OUTPATIENT)
Dept: FAMILY MEDICINE CLINIC | Facility: CLINIC | Age: 65
End: 2025-06-10
Payer: COMMERCIAL

## 2025-06-10 VITALS
OXYGEN SATURATION: 98 % | TEMPERATURE: 98 F | RESPIRATION RATE: 18 BRPM | HEART RATE: 60 BPM | BODY MASS INDEX: 32.9 KG/M2 | DIASTOLIC BLOOD PRESSURE: 82 MMHG | WEIGHT: 235 LBS | HEIGHT: 71 IN | SYSTOLIC BLOOD PRESSURE: 122 MMHG

## 2025-06-10 DIAGNOSIS — M54.6 ACUTE LEFT-SIDED THORACIC BACK PAIN: Primary | ICD-10-CM

## 2025-06-10 DIAGNOSIS — M62.830 SPASM OF MUSCLE, BACK: ICD-10-CM

## 2025-06-10 DIAGNOSIS — M54.12 CERVICAL RADICULOPATHY: ICD-10-CM

## 2025-06-10 PROCEDURE — 99213 OFFICE O/P EST LOW 20 MIN: CPT | Performed by: FAMILY MEDICINE

## 2025-06-10 RX ORDER — NAPROXEN 500 MG/1
500 TABLET ORAL 2 TIMES DAILY WITH MEALS
Qty: 30 TABLET | Refills: 0 | Status: SHIPPED | OUTPATIENT
Start: 2025-06-10

## 2025-06-10 RX ORDER — CYCLOBENZAPRINE HCL 10 MG
10 TABLET ORAL NIGHTLY
Qty: 15 TABLET | Refills: 0 | Status: SHIPPED | OUTPATIENT
Start: 2025-06-10 | End: 2025-06-25

## 2025-06-10 NOTE — PROGRESS NOTES
Family Medicine Progress Note  ASSESSMENT AND PLAN:  Bertrand Gonzalez is a 64 year old male who is here for:     Bertrand was seen today for back pain and other.    Diagnoses and all orders for this visit:    Acute left-sided thoracic back pain  Thoracic back pain with cervical radiculopathy  Worsening thoracic back pain with numbness in little and ring fingers suggests cervical radiculopathy, likely C7-C8 nerve roots. Muscle spasms in trapezius may exacerbate symptoms. Previous relief with muscle relaxers and steroids indicates muscle spasm and inflammation component.  - Prescribe naproxen 500 mg twice daily with food.  - Continue cyclobenzaprine 10 mg nightly.  - Refer to physical therapy, [facility] or [facility].  - Consider spine navigator referral if physical therapy is inaccessible or symptoms persist.  -     naproxen 500 MG Oral Tab; Take 1 tablet (500 mg total) by mouth 2 (two) times daily with meals.  -     Physical Therapy Referral - External    Cervical radiculopathy  -     naproxen 500 MG Oral Tab; Take 1 tablet (500 mg total) by mouth 2 (two) times daily with meals.  -     Physical Therapy Referral - External    Spasm of muscle, back  -     cyclobenzaprine 10 MG Oral Tab; Take 1 tablet (10 mg total) by mouth nightly for 15 days.  -     Physical Therapy Referral - External     The patient indicates understanding of these issues and agrees to the plan.  Follow-Up: The patient is asked to return in Return in about 1 week (around 6/17/2025), or if symptoms worsen or fail to improve.  .     Sarah Londono MD        CC: Back Pain      The following individual(s) verbally consented to be recorded using ambient AI listening technology and understand that they can each withdraw their consent to this listening technology at any point by asking the clinician to turn off or pause the recording:    Patient name: Bertrand Gonzalez    HPI:     History of Present Illness  Bertrand Gonzalez is a 64 year old male who presents  with mid-back pain and numbness in the fingers.    He experiences mid-back pain more on the left and numbness in his fingers 4th and 5th intermittently, which began after resuming physical activities such as biking and playing golf. Initially, he felt well enough to engage in these activities after his last treatment, but after one to two weeks, he noticed numbness and pain returning, particularly after playing golf.    The pain is described as a 'knife' sensation between the shoulder blades and spine, worsening by midday, especially when typing or driving. The numbness starts in the fingers, particularly the little and ring fingers, and progresses up to the shoulder blades by the end of the day. The numbness sometimes causes his fingers to curl in slightly.    No neck pain is reported, and the pain is localized to the mid-back, between the shoulder blades, and more towards the spine. He has been using topical treatments like Salonpas, which provide some relief.    An x-ray of the spine was previously conducted, showing degenerative changes. He attempted to schedule physical therapy but faced long wait times and logistical challenges, such as a 40-minute drive to the nearest available location.    He previously experienced significant relief from symptoms with muscle relaxers and possibly steroids, noting that the relief lasted for several days even after completing the medication course.    ALLERGY:     Allergies as of 06/10/2025 - Review Complete 06/10/2025   Allergen Reaction Noted    Penicillin v potassium  03/09/2013       MEDICATIONS:     Current Medications[1]   Past Medical History[2]   Social History:  Short Social Hx on File[3]     REVIEW OF SYSTEMS:   ROS as documented in HPI.    EXAM:   /82   Pulse 60   Temp 98.2 °F (36.8 °C) (Temporal)   Resp 18   Ht 5' 11\" (1.803 m)   Wt 235 lb (106.6 kg)   SpO2 98%   BMI 32.78 kg/m²   GENERAL: obese,in no apparent distress  SKIN: no rashes,no suspicious  lesions  NECK: supple,no tenderness to palpation over the cervical spine, normal ROM  LUNGS: clear to auscultation  CARDIO: RRR without murmur  BACK: mild tenderness to palpation over the thoracic spine, spasm of trapezius on the right with tenderness to palpation.  EXTREMITIES: LUE, normal sensation, strength 5/5, DTR 2+      NOTE TO PATIENT: The 21st Century Cures Act makes clinical notes like these available to patients in the interest of transparency. Clinical notes are medical documents used by physicians and care providers to communicate with each other. These documents include medical language and terminology, abbreviations, and treatment information that may sound technical and at times possibly unfamiliar. In addition, at times, the verbiage may appear blunt or direct. These documents are one tool providers use to communicate relevant information and clinical opinions of the care providers in a way that allows common understanding of the clinical context.      Sarah Londono MD             [1]   Current Outpatient Medications   Medication Sig Dispense Refill    FLUoxetine 20 MG Oral Cap Take 1 capsule (20 mg total) by mouth every morning.      atenolol 25 MG Oral Tab Take 0.5 tablets (12.5 mg total) by mouth daily.      omega-3-acid ethyl esters 1 g Oral Cap omega-3 acid ethyl esters (LOVAZA) 1 g capsule, [RxNorm: 016918]      methylPREDNISolone (MEDROL) 4 MG Oral Tablet Therapy Pack As directed. 21 tablet 0    metFORMIN  MG Oral Tablet 24 Hr Take 1 tablet (500 mg total) by mouth daily. 90 tablet 1    losartan 100 MG Oral Tab Take 1 tablet (100 mg total) by mouth daily. 90 tablet 1    atorvastatin 40 MG Oral Tab Take 1 tablet (40 mg total) by mouth nightly. 90 tablet 1    amLODIPine 5 MG Oral Tab Take 1 tablet (5 mg total) by mouth daily. 90 tablet 1    levothyroxine 50 MCG Oral Tab Take 1 tablet (50 mcg total) by mouth before breakfast. 90 tablet 3    Glucose Blood (ONETOUCH ULTRA) In Vitro Strip  Once daily 100 each 0    Blood Glucose Monitoring Suppl (ONETOUCH ULTRA 2) w/Device Does not apply Kit 1 Device by Other route 2 (two) times daily. Use as directed. 1 kit 0    apixaban 5 MG Oral Tab Take 1 tablet (5 mg total) by mouth 2 (two) times daily. 60 tablet 3    aspirin 81 MG Oral Tab EC Take 1 tablet (81 mg total) by mouth daily.      ClonazePAM 0.5 MG Oral Tab Take 1 tablet (0.5 mg total) by mouth nightly as needed for Anxiety. 30 tablet 1    Multiple Vitamin (TAB-A-ZULAY) Oral Tab Take 1 tablet by mouth daily.     [2]   Past Medical History:   Actinic keratosis    Acute, but ill-defined, cerebrovascular disease    ADD (attention deficit disorder)    Allergic rhinitis due to pollen    Allergic rhinitis, cause unspecified    Anxiety    Arrhythmia    Atherosclerosis of coronary artery    Atrial fibrillation (HCC)    CAD (coronary artery disease)    stent    Carotid stenosis    Coronary atherosclerosis    Coronary atherosclerosis of native coronary artery    Decorative tattoo    Diabetes (HCC)    Disorder of prostate    Disorder of thyroid    Easy bruising    Esophageal reflux    Essential hypertension    Essential hypertension, benign    Frequent urination    Gout    Heartburn    Hemorrhoids    High blood pressure    High cholesterol    History of cardiac murmur    History of depression    Hyperlipidemia    Hyperthyroidism    Hypertrophy of prostate with urinary obstruction and other lower urinary tract symptoms (LUTS)    Impaired fasting glucose    Night sweats    Nocturia    Obesity    TAO (obstructive sleep apnea)    AHI 17.9 REM AHI 22.7 Supine AHI 71.5 non-supine AHI 13.7 Sao2 Brad 84.9%     Pure hypercholesterolemia    Reflux esophagitis    Sleep apnea    Sleep disturbance    Stented coronary artery    Stress    Unspecified hypothyroidism    Wears glasses   [3]   Social History  Socioeconomic History    Marital status:     Number of children: 3   Occupational History    Occupation:     Tobacco Use    Smoking status: Former     Current packs/day: 0.00     Average packs/day: 0.5 packs/day for 32.0 years (16.0 ttl pk-yrs)     Types: Cigarettes     Start date: 1976     Quit date: 2008     Years since quittin.3    Smokeless tobacco: Never    Tobacco comments:     socail smoker   Vaping Use    Vaping status: Never Used   Substance and Sexual Activity    Alcohol use: Yes     Alcohol/week: 3.0 standard drinks of alcohol     Types: 3 Cans of beer per week     Comment: Have stopped due to triggering afib    Drug use: Yes     Frequency: 1.0 times per week     Types: Cannabis     Comment: occassionally edibles   Other Topics Concern     Service Yes    Blood Transfusions No    Caffeine Concern No    Occupational Exposure No    Hobby Hazards No    Sleep Concern Yes     Comment: using melatonin    Stress Concern No    Weight Concern No    Special Diet No    Back Care No    Exercise No    Bike Helmet Yes    Seat Belt No    Self-Exams No   Social History Narrative     with [employer]. , 3 children.      Social Drivers of Health     Food Insecurity: No Food Insecurity (2025)    NCSS - Food Insecurity     Worried About Running Out of Food in the Last Year: No     Ran Out of Food in the Last Year: No   Transportation Needs: No Transportation Needs (2025)    NCSS - Transportation     Lack of Transportation: No   Housing Stability: Not At Risk (2025)    NCSS - Housing/Utilities     Has Housing: Yes     Worried About Losing Housing: No     Unable to Get Utilities: No

## 2025-06-10 NOTE — PATIENT INSTRUCTIONS
VISIT SUMMARY:    You came in today because of mid-back pain and numbness in your fingers, which started after resuming activities like biking and playing golf. The pain feels like a 'knife' between your shoulder blades and worsens by midday, especially when typing or driving. The numbness starts in your fingers and can progress up to your shoulder blades by the end of the day.    YOUR PLAN:    -THORACIC BACK PAIN WITH CERVICAL RADICULOPATHY: Your symptoms suggest cervical radiculopathy, which means that nerves in your neck are being pinched or irritated, likely around the C7-C8 nerve roots. This can cause pain and numbness in your back and fingers. To help manage this, you are prescribed naproxen 500 mg to take twice daily with food and cyclobenzaprine 10 mg to take nightly. You are also referred to physical therapy at [facility] or [facility]. If physical therapy is not accessible or if your symptoms persist, we may consider referring you to a spine navigator for further evaluation.    INSTRUCTIONS:    Please take naproxen 500 mg twice daily with food and continue taking cyclobenzaprine 10 mg nightly. Schedule an appointment with a physical therapist at [facility] or [facility]. If you are unable to access physical therapy or if your symptoms do not improve, please contact us to discuss a referral to a spine navigator for further evaluation.    [facility] PHYSICAL THERAPY.

## 2025-07-01 ENCOUNTER — TELEPHONE (OUTPATIENT)
Dept: PHYSICAL THERAPY | Facility: HOSPITAL | Age: 65
End: 2025-07-01

## 2025-07-02 ENCOUNTER — OFFICE VISIT (OUTPATIENT)
Dept: PHYSICAL THERAPY | Facility: HOSPITAL | Age: 65
End: 2025-07-02
Attending: FAMILY MEDICINE
Payer: COMMERCIAL

## 2025-07-02 DIAGNOSIS — M54.6 ACUTE LEFT-SIDED THORACIC BACK PAIN: Primary | ICD-10-CM

## 2025-07-02 DIAGNOSIS — M51.34 DDD (DEGENERATIVE DISC DISEASE), THORACIC: ICD-10-CM

## 2025-07-02 DIAGNOSIS — M50.30 DDD (DEGENERATIVE DISC DISEASE), CERVICAL: ICD-10-CM

## 2025-07-02 PROCEDURE — 97140 MANUAL THERAPY 1/> REGIONS: CPT

## 2025-07-02 PROCEDURE — 97110 THERAPEUTIC EXERCISES: CPT

## 2025-07-02 PROCEDURE — 97162 PT EVAL MOD COMPLEX 30 MIN: CPT

## 2025-07-02 PROCEDURE — 97112 NEUROMUSCULAR REEDUCATION: CPT

## 2025-07-02 NOTE — PROGRESS NOTES
SPINE EVALUATION:     Diagnosis:   Acute left-sided thoracic back pain (M54.6)  Cervical radiculopathy (M54.12)  Spasm of muscle, back (M62.830) Patient:  Bertrand Gonzalez (64 year old, male)        Referring Provider: Sarah Londono  Today's Date   7/2/2025    Precautions:  None   Date of Evaluation: 07/02/25  Next MD visit: No data recorded  Date of Surgery: No data recorded     PATIENT SUMMARY     Summary of chief complaints: left side shoulder blade pain down the arm and into the lateral fingers  History of current condition: pt reports playing a lot of golf and riding a bicycle.  He was swinging with a weighted club 4 months ago and he felt a twinge in the back. It grew more intense.  He wakes up each morning and it feels good but by the end of the day, pain is bad.  He saw his chiropractor and an MD who gave him steroids, which helped.  He gets intense pain when riding his bike for the first few miles then it goes away.   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  Significant findings include: A-fib under control; carotid artery surgery  Imaging/Tests: x-ray   Bertrand  has a past medical history of Actinic keratosis, Acute, but ill-defined, cerebrovascular disease (Jan 26 2018 TIA), ADD (attention deficit disorder), Allergic rhinitis due to pollen, Allergic rhinitis, cause unspecified, Anxiety,  Arrhythmia, Atherosclerosis of coronary artery, Atrial fibrillation (HCC), CAD (coronary artery disease), Carotid stenosis, Coronary atherosclerosis, Coronary atherosclerosis of native coronary artery, Decorative tattoo, Diabetes (HCC), Disorder of prostate (Physical), Disorder of thyroid, Easy bruising (Since on blood thinner), Esophageal reflux, Essential hypertension, Essential hypertension, benign, Frequent urination (2016), Gout, Heartburn (Occasionally), Hemorrhoids (occasionally), High blood pressure, High cholesterol, History of cardiac murmur, History of depression, Hyperlipidemia, Hyperthyroidism, Hypertrophy of prostate with urinary obstruction and other lower urinary tract symptoms (LUTS), Impaired fasting glucose, Night sweats, Nocturia, Obesity, TAO (obstructive sleep apnea) (5/2/2021 PS[facility]), Pure hypercholesterolemia, Reflux esophagitis, Sleep apnea, Sleep disturbance, Stented coronary artery (2012), Stress, Unspecified hypothyroidism, and Wears glasses.  He  has a past surgical history that includes other surgical history; Carotid endarterectomy (5 yrs ago); angioplasty (coronary); and colonoscopy.    ASSESSMENT  Bertrand presents to physical therapy evaluation with primary c/o left side shoulder blade pain down the arm and into the lateral fingers. The results of the objective tests and measures show consistent with multiple diagnoses. Functional deficits include but are not limited to pain playing golf, bike riding, working; sleeps well. Signs and symptoms are consistent with diagnosis of Acute left-sided thoracic back pain (M54.6)  Cervical radiculopathy (M54.12)  Spasm of muscle, back (M62.830). Pt with radiculopathy likely exacerbated by work posture and recreational activities as well as T spine rotation that responded well to MET. Pt and PT discussed evaluation findings, pathology, POC and HEP.  Pt voiced understanding and performs HEP correctly without reported pain. Skilled Physical Therapy is  medically necessary to address the above impairments and reach functional goals.    OBJECTIVE:      Musculoskeletal:  Observation/Posture: forward head posture; rounded shoulders; increased thoracic kyphosis   Accessory Motion:     Palpation: R rotation of T spine at T7       ROM and Strength:  (* denotes performed with pain)     Cervical ROM MMT (-/5)     Flex 40 5     Ext 42 5    R L R L     Side bend 20 10 5 5     Rotation 60 70 5 5       Neurological:  Sensation: grossly intact to light touch JOSE UE/LE        Peripheral Neurodynamic: WNL except     Balance and Functional Mobility:  Gait: pt ambulates on level ground with normal mechanics.       Today's Treatment and Response:   Pt education was provided on exam findings, treatment diagnosis, treatment plan, expectations, and prognosis.    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 time), riding his bike, rowing and golfing to prevent radiculopathy   Manual Therapy -C distraction for radiculopathy   Therapeutic Exercise Minutes 10   Neuro Re-Educ Minutes 16   Manual Therapy Minutes 8   Evaluation Minutes 10   Total Time Of Timed Procedures 34   Total Time Of Service-Based Procedures 10   Total Treatment Time 44   HEP Access Code: [email or web address]  URL: [email or web address]  Date: 07/02/2025  Prepared by: Esha Garcia    Exercises  - Seated Thoracic Flexion and Rotation with Arms Crossed  - 1 x daily - 7 x weekly - 3 reps - 6 hold  - Seated Correct Posture  - 1 x daily - 7 x weekly - 3 sets - 10 reps  - Supine Chin Tuck  - 1 x daily - 7 x weekly - 2 sets - 10  reps - 5 hold  - Standing Isometric Cervical Flexion with Manual Resistance  - 1 x daily - 7 x weekly - 2 sets - 10 reps - 5 hold  - Standing Isometric Cervical Extension with Manual Resistance  - 1 x daily - 7 x weekly - 2 sets - 10 reps - 5 hold  - Standing Isometric Cervical Sidebending with Manual Resistance  - 1 x daily - 7 x weekly - 2 sets - 10 reps - 5 hold  - Seated Isometric Cervical Rotation  - 1 x daily - 7 x weekly - 2 sets - 10 reps - 5 hold        Patient was instructed in and issued a HEP for: Access Code: [email or web address]  URL: [email or web address]  Date: 07/02/2025  Prepared by: Esha Garcia    Exercises  - Seated Thoracic Flexion and Rotation with Arms Crossed  - 1 x daily - 7 x weekly - 3 reps - 6 hold  - Seated Correct Posture  - 1 x daily - 7 x weekly - 3 sets - 10 reps  - Supine Chin Tuck  - 1 x daily - 7 x weekly - 2 sets - 10 reps - 5 hold  - Standing Isometric Cervical Flexion with Manual Resistance  - 1 x daily - 7 x weekly - 2 sets - 10 reps - 5 hold  - Standing Isometric Cervical Extension with Manual Resistance  - 1 x daily - 7 x weekly - 2 sets - 10 reps - 5 hold  - Standing Isometric Cervical Sidebending with Manual Resistance  - 1 x daily - 7 x weekly - 2 sets - 10 reps - 5 hold  - Seated Isometric Cervical Rotation  - 1 x daily - 7 x weekly - 2 sets - 10 reps - 5 hold    Charges:  PT EVAL: Moderate Complexity, 1 NM, 1 MT, 1 TE  In agreement with evaluation findings and clinical rationale, this evaluation involved LOW COMPLEXITY decision making due to no personal factors/comorbidities, 1-2 body structures involved/activity limitations, and stable symptoms as documented in the evaluation.                                                                         PLAN OF CARE:      Goals: (to be met in 16 visits)   Patient to report compliance with finalized HEP to continue making functional progress upon DC.  Patient to demo understanding of initial HEP to start making  functional gains by first PN.  Patient to report ability to golf with 90 % improvement for return to desired activity by DC.  Patient to report ability to golf with 50 % ease to progress toward return to desired activity by first PN.  Patient to report 50 % improvement in pain in LUE for ease of working by first PN.  Patient to report 90 % improvement in pain for return to biking and golfing as desired by DC.  Patient to demo AROM C spine rotation to 70 deg w/max 1/10 pain for ease of golfing by DC.  Patient to demo understanding of self corrections of T spine R rotation to self-manage pain if it returns post DC  Pt to demo understanding of neutral spine postures to prevent LUE radiculopathy by first PN     Frequency / Duration: Patient will be seen 2x/week or a total of 16  visits over a 90 day period. Treatment will include: Manual Therapy; Neuromuscular Re-education; Therapeutic Activities; Home Exercise Program instruction; Therapeutic Exercise    Education or treatment limitation: None   Rehab Potential: good     Modified Oswestry Pre Score: 18      Patient/Family/Caregiver was advised of these findings, precautions, and treatment options and has agreed to actively participate in planning and for this course of care.    Thank you for your referral. Please co-sign or sign and return this letter via fax as soon as possible to [phone]. If you have any questions, please contact me at Dept: [phone]    Sincerely,  Electronically signed by therapist: Esha Garcia PT  Physician's certification required: Yes  I certify the need for these services furnished under this plan of treatment and while under my care.    X___________________________________________________ Date____________________    Certification From: 7/2/2025  To: 9/30/2025

## 2025-07-09 ENCOUNTER — OFFICE VISIT (OUTPATIENT)
Dept: PHYSICAL THERAPY | Facility: HOSPITAL | Age: 65
End: 2025-07-09
Attending: FAMILY MEDICINE
Payer: COMMERCIAL

## 2025-07-09 PROCEDURE — 97140 MANUAL THERAPY 1/> REGIONS: CPT

## 2025-07-09 PROCEDURE — 97530 THERAPEUTIC ACTIVITIES: CPT

## 2025-07-09 PROCEDURE — 97112 NEUROMUSCULAR REEDUCATION: CPT

## 2025-07-09 PROCEDURE — 97110 THERAPEUTIC EXERCISES: CPT

## 2025-07-09 NOTE — PROGRESS NOTES
Patient: Bertrand Gonzalez (64 year old, male) Referring Provider:  Insurance:   Diagnosis: Acute left-sided thoracic back pain (M54.6)  Cervical radiculopathy (M54.12)  Spasm of muscle, back (M62.830) Sarah Ramirezree  ANISAJIA [employer]   Date of Surgery: No data recorded Next MD visit:  N/A   Precautions:  None No data recorded Referral Information:    Date of Evaluation: Req: 0, Auth: 0, Exp:     07/02/25 POC Auth Visits:          Today's Date   7/9/2025    Subjective  I have not been having the stabbing pain when I am driving.  I have been doing the corrections faithfully.       Pain: 2/10     Objective  L rotation of T7 persists; C            Assessment  Pt was performing MET in poor alignment, causing radiculopathy. With correction of posture, he had less radicular pain.Skilled PT interventions performed in order to address patient dysfunction, including pain in the mid back and radiculopathy in the L UE.  Patient was educated and instructed on changing direction of T spine correction to movements to the R to prevent LUE radiculopathy and to relax his neck to minimize compensations and maximize desired muscle activation. Interventions performed focused on improving patient's ability to work, ride his bike and exercise without pain.  HEP was reviewed and/or upgraded to maximize compliance and increase patient's ability to make functional gains at home.     Goals (to be met in 16 visits)   Patient to report compliance with finalized HEP to continue making functional progress upon DC.  Patient to demo understanding of initial HEP to start making functional gains by first PN.  Patient to report ability to golf with 90 % improvement for return to desired activity by DC.  Patient to report ability to golf with 50 % ease to progress toward return to desired activity by first PN.  Patient to report 50 % improvement in pain in LUE for ease of working by first PN.  Patient to report 90 % improvement in pain for return to biking  and golfing as desired by DC.  Patient to demo AROM C spine rotation to 70 deg w/max 1/10 pain for ease of golfing by DC.  Patient to demo understanding of self corrections of T spine R rotation to self-manage pain if it returns post DC  Pt to demo understanding of neutral spine postures to prevent LUE radiculopathy by first PN         Plan  Normalize posture, eliminate radiculopathy, restore function    Treatment Last 4 Visits  Treatment Day: 2       7/2/2025 7/9/2025   Spine Treatment   Therapeutic Exercise -PROM L UT stretching in supine 2x60  -cervical isometrics in neutral spine to increase postural strength x 10  -chin tucks x 5  -review of HEP end of session and issued written copy -green TB B shoulder ER 2x10  -green TB PNF patterns 2x10  -green TB rows 2x10   Neuro Re-Education -T spine MET for netural alignment at T 7 level d/t R rotation  -posture re-educ for sitting at work (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 time), riding his bike, rowing and golfing to prevent radiculopathy -MET for L rotation of T7-modified to movements toward the R side d/t pain when correction was performed to L   Manual Therapy -C distraction for radiculopathy -C distraction for radiculopathy  -STM to UT, levators, scalenes, SCM   Therapeutic Activity  -posture education for neutral spine to prevent excessive lumbar extension and forward head alignment   Therapeutic Exercise Minutes 10 10   Neuro Re-Educ Minutes 16 12   Manual Therapy Minutes 8 12   Therapeutic Activity Minutes  10   Evaluation Minutes 10    Total Time Of Timed Procedures 34 44   Total Time Of Service-Based Procedures 10 0   Total Treatment Time 44 44   HEP Access Code: [email or web address]  URL: [email or web address]  Date: 07/02/2025  Prepared by: Esha Garcia    Exercises  - Seated Thoracic Flexion and Rotation with Arms Crossed  - 1 x daily - 7 x weekly - 3 reps - 6 hold  - Seated Correct Posture  - 1 x daily - 7 x weekly - 3  sets - 10 reps  - Supine Chin Tuck  - 1 x daily - 7 x weekly - 2 sets - 10 reps - 5 hold  - Standing Isometric Cervical Flexion with Manual Resistance  - 1 x daily - 7 x weekly - 2 sets - 10 reps - 5 hold  - Standing Isometric Cervical Extension with Manual Resistance  - 1 x daily - 7 x weekly - 2 sets - 10 reps - 5 hold  - Standing Isometric Cervical Sidebending with Manual Resistance  - 1 x daily - 7 x weekly - 2 sets - 10 reps - 5 hold  - Seated Isometric Cervical Rotation  - 1 x daily - 7 x weekly - 2 sets - 10 reps - 5 hold Access Code: [email or web address]  URL: [email or web address]  Date: 07/09/2025  Prepared by: Esha Garcia    Exercises  - Seated Thoracic Flexion and Rotation with Arms Crossed  - 1 x daily - 7 x weekly - 3 reps - 6 hold        HEP  Access Code: [email or web address]  URL: [email or web address]  Date: 07/09/2025  Prepared by: Esha Garcia    Exercises  - Seated Thoracic Flexion and Rotation with Arms Crossed  - 1 x daily - 7 x weekly - 3 reps - 6 hold    Charges  1 TE, 1 NM, 1 MT, 1 TA

## 2025-07-14 ENCOUNTER — APPOINTMENT (OUTPATIENT)
Dept: PHYSICAL THERAPY | Facility: HOSPITAL | Age: 65
End: 2025-07-14
Attending: FAMILY MEDICINE
Payer: COMMERCIAL

## 2025-07-14 ENCOUNTER — TELEPHONE (OUTPATIENT)
Dept: PHYSICAL THERAPY | Facility: HOSPITAL | Age: 65
End: 2025-07-14

## 2025-07-16 DIAGNOSIS — E11.65 TYPE 2 DIABETES MELLITUS WITH HYPERGLYCEMIA, WITHOUT LONG-TERM CURRENT USE OF INSULIN (HCC): ICD-10-CM

## 2025-07-16 NOTE — TELEPHONE ENCOUNTER
Bertrand Gonzalez requesting Medication Refill for:    Medication name and dose (copy and paste from medication list) metFORMIN  MG Oral Tablet 24 Hr     Preferred Pharmacy: hi on file    LOV: @[email or web address]@  Last Refill date: 1/31/2025  Next Scheduled appointment: 8/22/2025    Patient is going out of town on Saturday asking for refill to be sent asap, said he requested refill from pharmacy 2 days ago

## 2025-07-17 ENCOUNTER — APPOINTMENT (OUTPATIENT)
Dept: PHYSICAL THERAPY | Facility: HOSPITAL | Age: 65
End: 2025-07-17
Attending: FAMILY MEDICINE
Payer: COMMERCIAL

## 2025-07-17 RX ORDER — METFORMIN HYDROCHLORIDE 500 MG/1
500 TABLET, EXTENDED RELEASE ORAL DAILY
Qty: 90 TABLET | Refills: 3 | Status: SHIPPED | OUTPATIENT
Start: 2025-07-17

## 2025-07-24 ENCOUNTER — TELEPHONE (OUTPATIENT)
Dept: PHYSICAL THERAPY | Facility: HOSPITAL | Age: 65
End: 2025-07-24

## 2025-07-28 ENCOUNTER — APPOINTMENT (OUTPATIENT)
Dept: PHYSICAL THERAPY | Facility: HOSPITAL | Age: 65
End: 2025-07-28
Attending: FAMILY MEDICINE
Payer: COMMERCIAL

## 2025-08-22 ENCOUNTER — LAB ENCOUNTER (OUTPATIENT)
Dept: LAB | Age: 65
End: 2025-08-22
Attending: FAMILY MEDICINE

## 2025-08-22 ENCOUNTER — TELEPHONE (OUTPATIENT)
Dept: FAMILY MEDICINE CLINIC | Facility: CLINIC | Age: 65
End: 2025-08-22

## 2025-08-22 ENCOUNTER — OFFICE VISIT (OUTPATIENT)
Dept: FAMILY MEDICINE CLINIC | Facility: CLINIC | Age: 65
End: 2025-08-22

## 2025-08-22 VITALS
HEIGHT: 71 IN | OXYGEN SATURATION: 98 % | RESPIRATION RATE: 18 BRPM | DIASTOLIC BLOOD PRESSURE: 78 MMHG | BODY MASS INDEX: 33.6 KG/M2 | WEIGHT: 240 LBS | SYSTOLIC BLOOD PRESSURE: 120 MMHG | TEMPERATURE: 98 F

## 2025-08-22 DIAGNOSIS — E03.9 ACQUIRED HYPOTHYROIDISM: ICD-10-CM

## 2025-08-22 DIAGNOSIS — I10 ESSENTIAL HYPERTENSION: ICD-10-CM

## 2025-08-22 DIAGNOSIS — E78.2 MIXED HYPERLIPIDEMIA: ICD-10-CM

## 2025-08-22 DIAGNOSIS — E11.65 TYPE 2 DIABETES MELLITUS WITH HYPERGLYCEMIA, WITHOUT LONG-TERM CURRENT USE OF INSULIN (HCC): Primary | ICD-10-CM

## 2025-08-22 DIAGNOSIS — E66.09 CLASS 1 OBESITY DUE TO EXCESS CALORIES WITH SERIOUS COMORBIDITY AND BODY MASS INDEX (BMI) OF 33.0 TO 33.9 IN ADULT: ICD-10-CM

## 2025-08-22 DIAGNOSIS — E66.811 CLASS 1 OBESITY DUE TO EXCESS CALORIES WITH SERIOUS COMORBIDITY AND BODY MASS INDEX (BMI) OF 33.0 TO 33.9 IN ADULT: ICD-10-CM

## 2025-08-22 DIAGNOSIS — I48.0 PAROXYSMAL A-FIB (HCC): ICD-10-CM

## 2025-08-22 DIAGNOSIS — E11.65 TYPE 2 DIABETES MELLITUS WITH HYPERGLYCEMIA, WITHOUT LONG-TERM CURRENT USE OF INSULIN (HCC): ICD-10-CM

## 2025-08-22 LAB
ALBUMIN SERPL-MCNC: 4.7 G/DL (ref 3.2–4.8)
ALBUMIN/GLOB SERPL: 2.4 (ref 1–2)
ALP LIVER SERPL-CCNC: 69 U/L (ref 45–117)
ALT SERPL-CCNC: 38 U/L (ref 10–49)
ANION GAP SERPL CALC-SCNC: 8 MMOL/L (ref 0–18)
AST SERPL-CCNC: 20 U/L (ref ?–34)
BILIRUB SERPL-MCNC: 0.6 MG/DL (ref 0.2–1.1)
BUN BLD-MCNC: 14 MG/DL (ref 9–23)
CALCIUM BLD-MCNC: 9.9 MG/DL (ref 8.7–10.6)
CHLORIDE SERPL-SCNC: 107 MMOL/L (ref 98–112)
CHOLEST SERPL-MCNC: 144 MG/DL (ref ?–200)
CO2 SERPL-SCNC: 23 MMOL/L (ref 21–32)
CREAT BLD-MCNC: 1.09 MG/DL (ref 0.7–1.3)
CREAT UR-SCNC: 98.6 MG/DL
EGFRCR SERPLBLD CKD-EPI 2021: 74 ML/MIN/1.73M2 (ref 60–?)
EST. AVERAGE GLUCOSE BLD GHB EST-MCNC: 169 MG/DL (ref 68–126)
FASTING PATIENT LIPID ANSWER: YES
FASTING STATUS PATIENT QL REPORTED: YES
GLOBULIN PLAS-MCNC: 2 G/DL (ref 2–3.5)
GLUCOSE BLD-MCNC: 177 MG/DL (ref 70–99)
HBA1C MFR BLD: 7.5 % (ref ?–5.7)
HDLC SERPL-MCNC: 36 MG/DL (ref 40–59)
LDLC SERPL CALC-MCNC: 60 MG/DL (ref ?–100)
MICROALBUMIN UR-MCNC: 1 MG/DL
MICROALBUMIN/CREAT 24H UR-RTO: 10.1 UG/MG (ref ?–30)
NONHDLC SERPL-MCNC: 108 MG/DL (ref ?–130)
OSMOLALITY SERPL CALC.SUM OF ELEC: 291 MOSM/KG (ref 275–295)
POTASSIUM SERPL-SCNC: 4.2 MMOL/L (ref 3.5–5.1)
PROT SERPL-MCNC: 6.7 G/DL (ref 5.7–8.2)
SODIUM SERPL-SCNC: 138 MMOL/L (ref 136–145)
T4 FREE SERPL-MCNC: 1.4 NG/DL (ref 0.8–1.7)
TRIGL SERPL-MCNC: 303 MG/DL (ref 30–149)
TSI SER-ACNC: 2.17 UIU/ML (ref 0.55–4.78)
VLDLC SERPL CALC-MCNC: 45 MG/DL (ref 0–30)

## 2025-08-22 PROCEDURE — 84439 ASSAY OF FREE THYROXINE: CPT

## 2025-08-22 PROCEDURE — 83036 HEMOGLOBIN GLYCOSYLATED A1C: CPT

## 2025-08-22 PROCEDURE — 36415 COLL VENOUS BLD VENIPUNCTURE: CPT

## 2025-08-22 PROCEDURE — 99214 OFFICE O/P EST MOD 30 MIN: CPT | Performed by: FAMILY MEDICINE

## 2025-08-22 PROCEDURE — 82043 UR ALBUMIN QUANTITATIVE: CPT

## 2025-08-22 PROCEDURE — 82570 ASSAY OF URINE CREATININE: CPT

## 2025-08-22 PROCEDURE — 80061 LIPID PANEL: CPT

## 2025-08-22 PROCEDURE — 80053 COMPREHEN METABOLIC PANEL: CPT

## 2025-08-22 PROCEDURE — 84443 ASSAY THYROID STIM HORMONE: CPT

## 2025-08-22 RX ORDER — TIRZEPATIDE 2.5 MG/.5ML
2.5 INJECTION, SOLUTION SUBCUTANEOUS WEEKLY
Qty: 2 ML | Refills: 1 | Status: SHIPPED | OUTPATIENT
Start: 2025-08-22

## 2025-08-22 RX ORDER — AMLODIPINE BESYLATE 5 MG/1
5 TABLET ORAL DAILY
Qty: 90 TABLET | Refills: 1 | Status: SHIPPED | OUTPATIENT
Start: 2025-08-22

## 2025-08-22 RX ORDER — LEVOTHYROXINE SODIUM 50 UG/1
50 TABLET ORAL
Qty: 90 TABLET | Refills: 1 | Status: SHIPPED | OUTPATIENT
Start: 2025-08-22

## 2025-08-22 RX ORDER — LOSARTAN POTASSIUM 100 MG/1
100 TABLET ORAL DAILY
Qty: 90 TABLET | Refills: 1 | Status: SHIPPED | OUTPATIENT
Start: 2025-08-22

## 2025-08-22 RX ORDER — ATORVASTATIN CALCIUM 40 MG/1
40 TABLET, FILM COATED ORAL NIGHTLY
Qty: 90 TABLET | Refills: 1 | Status: SHIPPED | OUTPATIENT
Start: 2025-08-22

## (undated) DIAGNOSIS — E03.9 HYPOTHYROIDISM, UNSPECIFIED TYPE: ICD-10-CM

## (undated) DIAGNOSIS — I25.10 ATHEROSCLEROSIS OF NATIVE CORONARY ARTERY OF NATIVE HEART WITHOUT ANGINA PECTORIS: ICD-10-CM

## (undated) DIAGNOSIS — I65.23 ASYMPTOMATIC CAROTID ARTERY STENOSIS, BILATERAL: Primary | ICD-10-CM

## (undated) DIAGNOSIS — E78.2 MIXED HYPERLIPIDEMIA: ICD-10-CM

## (undated) NOTE — LETTER
01/03/20        [address] [facility] [address]      Dear Susanna Gottron,    [facility] records indicate that you have outstanding lab work and or testing that was ordered for you and has not yet been completed:  Orders Placed This Encounter

## (undated) NOTE — LETTER
04/24/18        [address] [facility] [address]      Dear Ba Cho,    [address] [facility] records indicate that you have outstanding lab work and or testing that was ordered for you and has not yet been completed:          TSH       To provide yo

## (undated) NOTE — LETTER
11/26/18        [address] [facility] [address]      Dear Susanna Gottron,    [address] [facility] records indicate that you have outstanding lab work and or testing that was ordered for you and has not yet been completed:  Orders Placed This Encounter

## (undated) NOTE — LETTER
[address]  [address]  Authorization for Surgical Operation and Procedure     Date:___04/14/2024________                                                                                                         Time:__1227________  I hereby authorize Cardioversion, my physician and his/her assistants (if applicable) Dr. Rendon, which may include medical students, residents, and/or fellows, to perform the following surgical operation/ procedure and administer such anesthesia as may be determined necessary by my physician:  Operation/Procedure name (s)  on Bertrand KRISTIN Gonzalez   2.   I recognize that during the surgical operation/procedure, unforeseen conditions may necessitate additional or different procedures than those listed above.  I, therefore, further authorize and request that the above-named surgeon, assistants, or designees perform such procedures as are, in their judgment, necessary and desirable.    3.   My surgeon/physician has discussed prior to my surgery the potential benefits, risks and side effects of this procedure; the likelihood of achieving goals; and potential problems that might occur during recuperation.  They also discussed reasonable alternatives to the procedure, including risks, benefits, and side effects related to the alternatives and risks related to not receiving this procedure.  I have had all my questions answered and I acknowledge that no guarantee has been made as to the result that may be obtained.    4.   Should the need arise during my operation/procedure, which includes change of level of care prior to discharge, I also consent to the administration of blood and/or blood products.  Further, I understand that despite careful testing and screening of blood or blood products by collecting agencies, I may still be subject to ill effects as a result of receiving a blood transfusion and/or blood products.  The following are some, but not all, of  the potential risks that can occur: fever and allergic reactions, hemolytic reactions, transmission of diseases such as Hepatitis, AIDS and Cytomegalovirus (CMV) and fluid overload.  In the event that I wish to have an autologous transfusion of my own blood, or a directed donor transfusion, I will discuss this with my physician.  Check only if Refusing Blood or Blood Products  I understand refusal of blood or blood products as deemed necessary by my physician may have serious consequences to my condition to include possible death. I hereby assume responsibility for my refusal and release the hospital, its personnel, and my physicians from any responsibility for the consequences of my refusal.          o  Refuse      5.   I authorize the use of any specimen, organs, tissues, body parts or foreign objects that may be removed from my body during the operation/procedure for diagnosis, research or teaching purposes and their subsequent disposal by hospital authorities.  I also authorize the release of specimen test results and/or written reports to my treating physician on the hospital medical staff or other referring or consulting physicians involved in my care, at the discretion of the Pathologist or my treating physician.    6.   I consent to the photographing or videotaping of the operations or procedures to be performed, including appropriate portions of my body for medical, scientific, or educational purposes, provided my identity is not revealed by the pictures or by descriptive texts accompanying them.  If the procedure has been photographed/videotaped, the surgeon will obtain the original picture, image, videotape or CD.  The hospital will not be responsible for storage, release or maintenance of the picture, image, tape or CD.    7.   I consent to the presence of a  or observers in the operating room as deemed necessary by my physician or their designees.    8.   I recognize that in the event  my procedure results in extended X-Ray/fluoroscopy time, I may develop a skin reaction.    9. If I have a Do Not Attempt Resuscitation (DNAR) order in place, that status will be suspended while in the operating room, procedural suite, and during the recovery period unless otherwise explicitly stated by me (or a person authorized to consent on my behalf). The surgeon or my attending physician will determine when the applicable recovery period ends for purposes of reinstating the DNAR order.  10. Patients having a sterilization procedure: I understand that if the procedure is successful the results will be permanent and it will therefore be impossible for me to inseminate, conceive, or bear children.  I also understand that the procedure is intended to result in sterility, although the result has not been guaranteed.   11. I acknowledge that my physician has explained sedation/analgesia administration to me including the risk and benefits I consent to the administration of sedation/analgesia as may be necessary or desirable in the judgment of my physician.    I CERTIFY THAT I HAVE READ AND FULLY UNDERSTAND THE ABOVE CONSENT TO OPERATION and/or OTHER PROCEDURE.    _________________________________________  __________________________________  Signature of Patient     Signature of Responsible Person         ___________________________________         Printed Name of Responsible Person           _________________________________                 Relationship to Patient  _________________________________________  ______________________________  Signature of Witness          Date  Time      Patient Name: Bertrand Gonzalez     : 1960                 Printed: 2024     Medical Record #: [id number]                     Page 1 of [address] [place] [address]  [id number]    Consent for Anesthesia    I, Bertrand Gonzalez agree to be cared for by an  anesthesiologist, who is specially trained to monitor me and give me medicine to put me to sleep or keep me comfortable during my procedure    I understand that my anesthesiologist is not an employee or agent of [facility] or [employer]. He or she works for [employer].    As the patient asking for anesthesia services, I agree to:  Allow the anesthesiologist (anesthesia doctor) to give me medicine and do additional procedures as necessary. Some examples are: Starting or using an “IV” to give me medicine, fluids or blood during my procedure, and having a breathing tube placed to help me breathe when I’m asleep (intubation). In the event that my heart stops working properly, I understand that my anesthesiologist will make every effort to sustain my life, unless otherwise directed by [facility] Do Not Resuscitate documents.  Tell my anesthesia doctor before my procedure:  If I am pregnant.  The last time that I ate or drank.  All of the medicines I take (including prescriptions, herbal supplements, and pills I can buy without a prescription (including street drugs/illegal medications). Failure to inform my anesthesiologist about these medicines may increase my risk of anesthetic complications.  If I am allergic to anything or have had a reaction to anesthesia before.  I understand how the anesthesia medicine will help me (benefits).  I understand that with any type of anesthesia medicine there are risks:  The most common risks are: nausea, vomiting, sore throat, muscle soreness, damage to my eyes, mouth, or teeth (from breathing tube placement).  Rare risks include: remembering what happened during my procedure, allergic reactions to medications, injury to my airway, heart, lungs, vision, nerves, or muscles and in extremely rare instances death.  My doctor has explained to me other choices available to me for my care (alternatives).  Pregnant Patients (“epidural”):  I understand  that the risks of having an epidural (medicine given into my back to help control pain during labor), include itching, low blood pressure, difficulty urinating, headache or slowing of the baby’s heart. Very rare risks include infection, bleeding, seizure, irregular heart rhythms and nerve injury.  Regional Anesthesia (“spinal”, “epidural”, & “nerve blocks”):  I understand that rare but potential complications include headache, bleeding, infection, seizure, irregular heart rhythms, and nerve injury.    I can change my mind about having anesthesia services at any time before I get the medicine.    _____________________________________________________________________________  Patient (or Representative) Signature/Relationship to Patient  Date   Time    _____________________________________________________________________________   Name (if used)    Language/Organization   Time    _____________________________________________________________________________  Anesthesiologist Signature     Date   Time  I have discussed the procedure and information above with the patient (or patient’s representative) and answered their questions. The patient or their representative has agreed to have anesthesia services.    _____________________________________________________________________________  Witness        Date   Time  I have verified that the signature is that of the patient or patient’s representative, and that it was signed before the procedure  Patient Name: Bertrand Gonzalez     : 1960                 Printed: 2024     Medical Record #: [id number]                     Page 2 of 2

## (undated) NOTE — MR AVS SNAPSHOT
Wendy Rice [address] [facility] [address] [phone]               Thank you for choosing us for your health care visit with [facility] MD.  We are glad to serve you and happy to provide you with this albuterol sulfate (2.5 MG/3ML) 0.083% Nebu   Take 3 mL by nebulization every 4 (four) hours as needed for Wheezing. Commonly known as:  VENTOLIN           ASPIR-81 81 MG Tbec   Generic drug:  aspirin   Take 81 mg by mouth daily.            atenolol 25 MG office, you can view your past visit information in [employer] by going to Visits < Visit Summaries. [employer] questions? Call [phone] for help. [employer] is NOT to be used for urgent needs. For medical emergencies, dial 911.         Educational Inform

## (undated) NOTE — Clinical Note
TONNY Reveles completed TCM. Patient has upcoming TCM/HFU appointment scheduled on 04/17/24. Thank you.

## (undated) NOTE — LETTER
01/27/21            Dear Patient:    I am writing to you to remind you to schedule your annual diabetic eye exam.  Diabetes remains one of the leading causes of blindness in American adults.   Early diagnosis is important in preventing the progression to mo

## (undated) NOTE — LETTER
03/05/19        [address] [facility] [address]      Dear Claudio Ortez,    [address] [facility] records indicate that you have outstanding lab work and or testing that was ordered for you and has not yet been completed:  Orders Placed This Encounter

## (undated) NOTE — LETTER
05/17/21        [address] [facility] [address]      Dear Krystyna Sandoval,    [address] [facility] records indicate that you have outstanding lab work and or testing that was ordered for you and has not yet been completed:  Orders Placed This Encounter